# Patient Record
Sex: MALE | Race: WHITE | NOT HISPANIC OR LATINO | Employment: OTHER | URBAN - NONMETROPOLITAN AREA
[De-identification: names, ages, dates, MRNs, and addresses within clinical notes are randomized per-mention and may not be internally consistent; named-entity substitution may affect disease eponyms.]

---

## 2017-05-08 RX ORDER — CLOPIDOGREL BISULFATE 75 MG/1
75 TABLET ORAL DAILY
Qty: 184 TABLET | Refills: 0 | Status: SHIPPED | OUTPATIENT
Start: 2017-05-08 | End: 2017-11-08

## 2017-05-22 ENCOUNTER — OFFICE VISIT (OUTPATIENT)
Dept: VASCULAR SURGERY | Facility: CLINIC | Age: 57
End: 2017-05-22

## 2017-05-22 ENCOUNTER — HOSPITAL ENCOUNTER (OUTPATIENT)
Dept: ULTRASOUND IMAGING | Facility: HOSPITAL | Age: 57
Discharge: HOME | End: 2017-05-22
Admitting: NURSE PRACTITIONER

## 2017-05-22 VITALS
SYSTOLIC BLOOD PRESSURE: 128 MMHG | WEIGHT: 181 LBS | HEART RATE: 86 BPM | HEIGHT: 76 IN | BODY MASS INDEX: 22.04 KG/M2 | DIASTOLIC BLOOD PRESSURE: 92 MMHG

## 2017-05-22 DIAGNOSIS — Z72.0 TOBACCO ABUSE: ICD-10-CM

## 2017-05-22 DIAGNOSIS — I73.9 PAD (PERIPHERAL ARTERY DISEASE) (HCC): ICD-10-CM

## 2017-05-22 DIAGNOSIS — E78.00 HYPERCHOLESTEROLEMIA: ICD-10-CM

## 2017-05-22 DIAGNOSIS — I10 ESSENTIAL HYPERTENSION: ICD-10-CM

## 2017-05-22 DIAGNOSIS — I73.9 PAD (PERIPHERAL ARTERY DISEASE) (HCC): Primary | ICD-10-CM

## 2017-05-22 PROCEDURE — 99214 OFFICE O/P EST MOD 30 MIN: CPT | Performed by: NURSE PRACTITIONER

## 2017-05-22 PROCEDURE — 93923 UPR/LXTR ART STDY 3+ LVLS: CPT

## 2017-05-22 PROCEDURE — 93923 UPR/LXTR ART STDY 3+ LVLS: CPT | Performed by: SURGERY

## 2017-11-17 ENCOUNTER — HOSPITAL ENCOUNTER (OUTPATIENT)
Dept: ULTRASOUND IMAGING | Facility: HOSPITAL | Age: 57
Discharge: HOME | End: 2017-11-17
Admitting: NURSE PRACTITIONER

## 2017-11-17 ENCOUNTER — OFFICE VISIT (OUTPATIENT)
Dept: VASCULAR SURGERY | Facility: CLINIC | Age: 57
End: 2017-11-17

## 2017-11-17 VITALS
HEART RATE: 92 BPM | SYSTOLIC BLOOD PRESSURE: 124 MMHG | BODY MASS INDEX: 22.41 KG/M2 | DIASTOLIC BLOOD PRESSURE: 80 MMHG | HEIGHT: 76 IN | WEIGHT: 184 LBS

## 2017-11-17 DIAGNOSIS — I73.9 PAD (PERIPHERAL ARTERY DISEASE) (HCC): Primary | ICD-10-CM

## 2017-11-17 DIAGNOSIS — I73.9 PAD (PERIPHERAL ARTERY DISEASE) (HCC): ICD-10-CM

## 2017-11-17 DIAGNOSIS — E78.00 HYPERCHOLESTEROLEMIA: ICD-10-CM

## 2017-11-17 DIAGNOSIS — I10 ESSENTIAL HYPERTENSION: ICD-10-CM

## 2017-11-17 DIAGNOSIS — Z72.0 TOBACCO ABUSE: ICD-10-CM

## 2017-11-17 PROCEDURE — 93924 LWR XTR VASC STDY BILAT: CPT | Performed by: SURGERY

## 2017-11-17 PROCEDURE — 99213 OFFICE O/P EST LOW 20 MIN: CPT | Performed by: NURSE PRACTITIONER

## 2017-11-17 PROCEDURE — 93923 UPR/LXTR ART STDY 3+ LVLS: CPT

## 2017-11-17 RX ORDER — CLOPIDOGREL BISULFATE 75 MG/1
75 TABLET ORAL DAILY
Qty: 30 TABLET | Refills: 5 | Status: ON HOLD | OUTPATIENT
Start: 2017-11-17 | End: 2019-12-31 | Stop reason: SDUPTHER

## 2017-11-17 RX ORDER — CLOPIDOGREL BISULFATE 75 MG/1
75 TABLET ORAL DAILY
Qty: 30 TABLET | Refills: 5 | Status: SHIPPED | OUTPATIENT
Start: 2017-11-17 | End: 2017-11-17 | Stop reason: SDUPTHER

## 2017-11-17 RX ORDER — CLOPIDOGREL BISULFATE 75 MG/1
75 TABLET ORAL DAILY
COMMUNITY
End: 2017-11-17 | Stop reason: SDUPTHER

## 2017-11-17 NOTE — PROGRESS NOTES
"11/17/2017       Narendra Camara MD  [address] [place] [address] [address]  [place] [address]        Sascha MUÑIZ Moapa Sr.  1960    Chief Complaint   Patient presents with   • Follow-up     Patient here for SAMI results. Pain to right leg when walking.       Dear Narendra Camara MD:    HPI     I had the pleasure of seeing you patient in the office today for follow up.  As you recall, the patient is a 57 y.o. male who we are currently following for bilateral lower extremity PAD. He initially underwent intervention of his left leg in March 2013. He was lost to follow-up at that point and return to year and a half later complaining of disabling claudication in the left leg. It is sensitive been occluded and he was taken back for revascularization on 2 other occasions, most recently February 2016. Unfortunately, he does continue to smoke cigarettes. He is now back for continued follow-up. He did have noninvasive testing done today, which I did review. He is currently doing well and denies any claudication to his lower extremity.  He does have complaints of burning and numbness to his right lower extremity.     /80  Pulse 92  Ht 76\" (193 cm)  Wt 184 lb (83.5 kg)  BMI 22.4 kg/m2  Physical Exam  Constitutional: He is oriented to person, place, and time. He appears well-developed and well-nourished. No distress.   HENT:   Head: Normocephalic and atraumatic.   Mouth/Throat: Oropharynx is clear and moist and mucous membranes are normal.   Eyes: Pupils are equal, round, and reactive to light.   Neck: Normal range of motion. Neck supple. No JVD present. Carotid bruit is not present. No thyromegaly present.   Cardiovascular: Normal rate, regular rhythm, S1 normal, S2 normal, normal heart sounds and normal pulses. Exam reveals no gallop and no friction rub.   No murmur heard.  Pulses:  Femoral pulses are 2+ on the right side, and 2+ on the left side.  Popliteal pulses are 2+ on the right side, and 2+ on the left side. "   Dorsalis pedis pulses are 2+ on the right side, and 2+ on the left side.   Posterior tibial pulses are 2+ on the right side, and 2+ on the left side.   Pulmonary/Chest: Effort normal and breath sounds normal.   Abdominal: Soft. Normal appearance, normal aorta and bowel sounds are normal. He exhibits no abdominal bruit. There is no hepatosplenomegaly. There is no tenderness.   Musculoskeletal: Normal range of motion.     Vascular Status - His exam exhibits no right foot edema. His exam exhibits no left foot edema.  Neurological: He is alert and oriented to person, place, and time. He has normal strength. No cranial nerve deficit.   Skin: Skin is warm, dry and intact. He is not diaphoretic.   Psychiatric: He has a normal mood and affect. His behavior is normal. Judgment and thought content normal. Cognition and memory are normal.   Nursing note and vitals reviewed.    DIAGNOSTIC DATA:      Patient Active Problem List   Diagnosis   • Pneumonia of both lower lobes due to infectious organism   • Sinus tachycardia   • Tobacco abuse   • Emphysema of lung   • Cough with hemoptysis   • Coffee ground emesis   • Paroxysmal SVT (supraventricular tachycardia)   • Chest pain with low risk for cardiac etiology   • Hypercholesterolemia   • Hypertension   • Chronic back pain         ICD-10-CM ICD-9-CM   1. PAD (peripheral artery disease) I73.9 443.9   2. Hypercholesterolemia E78.00 272.0   3. Essential hypertension I10 401.9   4. Tobacco abuse Z72.0 305.1         PLAN: After thoroughly evaluating Sascha Ceronclari Taylor, I believe the best course of action is to remain conservative from a vascular standpoint.  We will see Sascha MUÑIZ Cecille Taylor back in 6 months with repeat noninvasive testing for continued surveillance.  His right leg pain/numbness sounds like it is related to lumbar radiculopathy.  He does have a history of chronic back pain.  I did  extensively on smoking cessation, and the patient was advised of the continued risks  of smoking.  I provided over 10 minutes counseling on this matter.  I did discuss vascular risk factors as they pertain to the progression of vascular disease including controlling his hypertension, hyperlipidemia, and smoking cessation.  The patient is to continue taking their medications as previously discussed.   This was all discussed in full with complete understanding.    Thank you for allowing me to participate in the care of your patient.  Please do not hesitate to call with any questions or concerns.  We will keep you aware of any further encounters with Sascha Oden Sr..      Sincerely Yours,        FORTNIO Dubose

## 2017-11-24 ENCOUNTER — HOSPITAL ENCOUNTER (EMERGENCY)
Facility: HOSPITAL | Age: 57
Discharge: HOME | End: 2017-11-25
Attending: FAMILY MEDICINE | Admitting: FAMILY MEDICINE

## 2017-11-24 DIAGNOSIS — M54.50 ACUTE BILATERAL LOW BACK PAIN WITHOUT SCIATICA: ICD-10-CM

## 2017-11-24 DIAGNOSIS — R10.32 LEFT LOWER QUADRANT PAIN: ICD-10-CM
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
## 2017-11-24 PROCEDURE — 93005 ELECTROCARDIOGRAM TRACING: CPT

## 2017-11-24 PROCEDURE — 85025 COMPLETE CBC W/AUTO DIFF WBC: CPT | Performed by: FAMILY MEDICINE

## 2017-11-24 PROCEDURE — 80053 COMPREHEN METABOLIC PANEL: CPT | Performed by: FAMILY MEDICINE

## 2017-11-24 PROCEDURE — 83690 ASSAY OF LIPASE: CPT | Performed by: FAMILY MEDICINE

## 2017-11-24 PROCEDURE — 99284 EMERGENCY DEPT VISIT MOD MDM: CPT

## 2017-11-24 PROCEDURE — 85007 BL SMEAR W/DIFF WBC COUNT: CPT | Performed by: FAMILY MEDICINE

## 2017-11-24 PROCEDURE — 93010 ELECTROCARDIOGRAM REPORT: CPT | Performed by: INTERNAL MEDICINE

## 2017-11-25 ENCOUNTER — APPOINTMENT (OUTPATIENT)
Dept: CT IMAGING | Facility: HOSPITAL | Age: 57
End: 2017-11-25

## 2017-11-25 VITALS
HEIGHT: 76 IN | RESPIRATION RATE: 26 BRPM | WEIGHT: 210 LBS | TEMPERATURE: 98.4 F | OXYGEN SATURATION: 94 % | DIASTOLIC BLOOD PRESSURE: 76 MMHG | HEART RATE: 80 BPM | BODY MASS INDEX: 25.57 KG/M2 | SYSTOLIC BLOOD PRESSURE: 109 MMHG

## 2017-11-25 LAB
ALBUMIN SERPL-MCNC: 4.2 G/DL (ref 3.5–5)
ALBUMIN/GLOB SERPL: 1.4 G/DL (ref 1.1–2.5)
ALP SERPL-CCNC: 59 U/L (ref 24–120)
ALT SERPL W P-5'-P-CCNC: 74 U/L (ref 0–54)
ANION GAP SERPL CALCULATED.3IONS-SCNC: 14 MMOL/L (ref 4–13)
AST SERPL-CCNC: 46 U/L (ref 7–45)
BASOPHILS # BLD AUTO: 0.06 10*3/MM3 (ref 0–0.2)
BASOPHILS NFR BLD AUTO: 0.5 % (ref 0–2)
BILIRUB SERPL-MCNC: 0.4 MG/DL (ref 0.1–1)
BILIRUB UR QL STRIP: NEGATIVE
BUN BLD-MCNC: 6 MG/DL (ref 5–21)
BUN/CREAT SERPL: 7 (ref 7–25)
CALCIUM SPEC-SCNC: 9 MG/DL (ref 8.4–10.4)
CHLORIDE SERPL-SCNC: 97 MMOL/L (ref 98–110)
CLARITY UR: CLEAR
CO2 SERPL-SCNC: 23 MMOL/L (ref 24–31)
COLOR UR: YELLOW
CREAT BLD-MCNC: 0.86 MG/DL (ref 0.5–1.4)
DEPRECATED RDW RBC AUTO: 44.7 FL (ref 40–54)
EOSINOPHIL # BLD AUTO: 0.31 10*3/MM3 (ref 0–0.7)
EOSINOPHIL NFR BLD AUTO: 2.5 % (ref 0–4)
ERYTHROCYTE [DISTWIDTH] IN BLOOD BY AUTOMATED COUNT: 13.1 % (ref 12–15)
GFR SERPL CREATININE-BSD FRML MDRD: 92 ML/MIN/1.73
GLOBULIN UR ELPH-MCNC: 3 GM/DL
GLUCOSE BLD-MCNC: 91 MG/DL (ref 70–100)
GLUCOSE UR STRIP-MCNC: NEGATIVE MG/DL
HCT VFR BLD AUTO: 45.2 % (ref 40–52)
HGB BLD-MCNC: 15.8 G/DL (ref 14–18)
HGB UR QL STRIP.AUTO: NEGATIVE
IMM GRANULOCYTES # BLD: 0.05 10*3/MM3 (ref 0–0.03)
IMM GRANULOCYTES NFR BLD: 0.4 % (ref 0–5)
KETONES UR QL STRIP: NEGATIVE
LEUKOCYTE ESTERASE UR QL STRIP.AUTO: NEGATIVE
LIPASE SERPL-CCNC: 103 U/L (ref 23–203)
LYMPHOCYTES # BLD AUTO: 3.19 10*3/MM3 (ref 0.72–4.86)
LYMPHOCYTES NFR BLD AUTO: 25.3 % (ref 15–45)
MCH RBC QN AUTO: 32.7 PG (ref 28–32)
MCHC RBC AUTO-ENTMCNC: 35 G/DL (ref 33–36)
MCV RBC AUTO: 93.6 FL (ref 82–95)
MONOCYTES # BLD AUTO: 1.47 10*3/MM3 (ref 0.19–1.3)
MONOCYTES NFR BLD AUTO: 11.7 % (ref 4–12)
NEUTROPHILS # BLD AUTO: 7.51 10*3/MM3 (ref 1.87–8.4)
NEUTROPHILS NFR BLD AUTO: 59.6 % (ref 39–78)
NITRITE UR QL STRIP: NEGATIVE
NRBC BLD MANUAL-RTO: 0 /100 WBC (ref 0–0)
PH UR STRIP.AUTO: <=5 [PH] (ref 5–8)
PLATELET # BLD AUTO: 210 10*3/MM3 (ref 130–400)
PMV BLD AUTO: 9.9 FL (ref 6–12)
POTASSIUM BLD-SCNC: 3.7 MMOL/L (ref 3.5–5.3)
PROT SERPL-MCNC: 7.2 G/DL (ref 6.3–8.7)
PROT UR QL STRIP: NEGATIVE
RBC # BLD AUTO: 4.83 10*6/MM3 (ref 4.8–5.9)
RBC MORPH BLD: NORMAL
SMALL PLATELETS BLD QL SMEAR: ADEQUATE
SODIUM BLD-SCNC: 134 MMOL/L (ref 135–145)
SP GR UR STRIP: 1.01 (ref 1–1.03)
UROBILINOGEN UR QL STRIP: NORMAL
WBC MORPH BLD: NORMAL
WBC NRBC COR # BLD: 12.59 10*3/MM3 (ref 4.8–10.8)

## 2017-11-25 PROCEDURE — 0 IOPAMIDOL 61 % SOLUTION: Performed by: FAMILY MEDICINE

## 2017-11-25 PROCEDURE — 74177 CT ABD & PELVIS W/CONTRAST: CPT

## 2017-11-25 PROCEDURE — 72131 CT LUMBAR SPINE W/O DYE: CPT

## 2017-11-25 PROCEDURE — 81003 URINALYSIS AUTO W/O SCOPE: CPT | Performed by: FAMILY MEDICINE

## 2017-11-25 PROCEDURE — 96374 THER/PROPH/DIAG INJ IV PUSH: CPT

## 2017-11-25 PROCEDURE — 72128 CT CHEST SPINE W/O DYE: CPT

## 2017-11-25 PROCEDURE — 25010000002 KETOROLAC TROMETHAMINE PER 15 MG: Performed by: FAMILY MEDICINE

## 2017-11-25 RX ORDER — KETOROLAC TROMETHAMINE 30 MG/ML
30 INJECTION, SOLUTION INTRAMUSCULAR; INTRAVENOUS ONCE
Status: COMPLETED | OUTPATIENT
Start: 2017-11-25 | End: 2017-11-25

## 2017-11-25 RX ADMIN — KETOROLAC TROMETHAMINE 30 MG: 30 INJECTION, SOLUTION INTRAMUSCULAR at 01:50

## 2017-11-25 RX ADMIN — IOPAMIDOL 100 ML: 612 INJECTION, SOLUTION INTRAVENOUS at 01:37

## 2017-11-25 NOTE — ED PROVIDER NOTES
[facility]  eMERGENCY dEPARTMENT eNCOUnter      Pt Name: Sascha Oden Sr.  MRN: [id number]  Birthdate 1960  Date of evaluation: 11/25/2017      CHIEF COMPLAINT       Chief Complaint   Patient presents with   • Syncope   • Fall       Nurses Notes reviewed and I agree except as noted in the HPI.      HISTORY OF PRESENT ILLNESS    Sascha Oden Sr. is a 57 y.o. male who presents   Mr. table or presents for back pain and left lower quadrant pain after sustaining a fall.  Patient reports that he was spraying some bug spray inhaled some of it and this caused him to get very dizzy everything went kind of black and he fell backwards.  He states that he landed on a concrete floor in the residence.  He denies hitting his head.  He states that his lower back hurts as well as his left lower quadrant.  He reports having 4 beers tonight.  He describes the pain as sharp and severe.  No radiation.       REVIEW OF SYSTEMS     Review of Systems  CONSTITUION: No Fever, No chills, No activity change, No diaphoresis, No unexpected wt change.    HENT: No congestion, no dental problems, no ear pain or discharge,   EYES: No drainage, no itching, no photophobia, no visual disturbance  RESPIRATORY: No apnea, no chest tightness, no cough, no shortness of breath, no stridor, no wheezing  CARDIOVASCULAR: No chest pain, no palpitations, no leg swelling  GI: Per the HPI otherwise negative   ENDOCRINE: No polydipsia, no polyphagia, no polyuria, no cold or heat intolerance  : No difficulty urinating, no dyspareunia, nodysuria, no flank pain, no frequency, no genital sore, no hematuria, no menstrual problem if female, no decreased urniation, no hesitation of urination, no vaginal discharge if female, no vaginal pain if female no penile pain if male  ALLERG/IMMUNO:  No env or food allergies, not immunocompromised  NEUROLOGICAL: No dizziness, no facial asymmetry, no Headaches, no Light-headedness, no numbess nor paresthesias, no  seizures, no speech difficulty, No syncope, no temors, no weakness  HEMATOLOGIC: No adenopathy, no unusual bleeding or bruising  MUSC:As per the HPI otherwise negative   SKIN: No rash, no color change, no pallor, no wound  PSYCH: No agitation, no behavior problem, no confusion, no decr concentration, no hallucinations, no suicidal ideation, no homicidal ideation, no self injury, no sleep disturbance      PAST MEDICAL HISTORY     Past Medical History:   Diagnosis Date   • Arthritis    • Chronic back pain     s/p fall    • Emphysema of lung    • Hypercholesterolemia     Statin therapy    • Hypertension    • PAP (pulmonary alveolar proteinosis)    • PVD (peripheral vascular disease)     Followed by Dr. Juarez; ASA, Plavix, leg revascularization    • Tobacco abuse     1 PPD       SURGICAL HISTORY      has a past surgical history that includes Angioplasty; Carotid stent; Appendectomy; Back surgery; Leg Revascularization (Bilateral); and Hand surgery (Right).    CURRENT MEDICATIONS        Medication List      CONTINUE taking these medications          clopidogrel 75 MG tablet   Commonly known as:  PLAVIX   Take 1 tablet by mouth Daily.       HYDROcodone-acetaminophen  MG per tablet   Commonly known as:  NORCO       methocarbamol 750 MG tablet   Commonly known as:  ROBAXIN       metoprolol tartrate 25 MG tablet   Commonly known as:  LOPRESSOR   Take 0.5 tablets by mouth Every 12 (Twelve) Hours.       simvastatin 20 MG tablet   Commonly known as:  ZOCOR           ALLERGIES     is allergic to codeine.    FAMILY HISTORY     indicated that his mother is alive. He indicated that his father is . He indicated that his maternal grandfather is . He indicated that the status of his paternal grandmother is unknown. He indicated that the status of his paternal grandfather is unknown. He indicated that the status of his other is unknown.  family history includes Arrhythmia in his paternal grandmother; Cancer  "in his other, paternal grandfather, and paternal grandmother; Diabetes in his mother and other; Heart attack in his maternal grandfather; Heart disease in his other; Hypertension in his mother; Kidney disease in his father and other; Stroke in his other.    SOCIAL HISTORY      reports that he has been smoking Cigarettes.  He has been smoking about 0.50 packs per day. He has never used smokeless tobacco. He reports that he drinks about 7.2 oz of alcohol per week  He reports that he does not use illicit drugs.    PHYSICAL EXAM     INITIAL VITALS:  height is 76\" (193 cm) and weight is 210 lb (95.3 kg). His temperature is 98.6 °F (37 °C). His blood pressure is 117/78 and his pulse is 97. His respiration is 14 and oxygen saturation is 95%.    Physical Exam    CONSTITUTIONAL: Well developed, well nourished, not diaphoretic nor distressed  HENT: Normocephalic, atraumatic, oropharynx clear and moist  EYES: PERRL, EOM normal, no discharge, no scleral icterus  NECK: ROM normal, supple, no tracheal deviation nor JVD, no stridor  CARDIOVASCULAR: Normal rate and rhythm, heart sounds normal, no rub no gallop, intact distal pulses, normal cap refill  PULMONARY: Normal effort and breath sounds, no distress, no wheezes, rhonci or rales, no chest tenderness  ABDOMINAL: Soft, nontender, no guarding, no mass, no rebound, no hernia  GENITOURINARY/ANORECTAL: deferred  MUSCKULOSKELETAL: ROM normal, no tenderness nor deformity, no edema  NEUROLOGICAL: Alert, oriented x 3, DTRS normal, CN x 10 normal, normal tone  SKIN: Warm, dry, no erythema, no rash, normal color  PSYCH: Mood and affect normal, behavior normal, thought content and judgement normal.    DIFFERENTIAL DIAGNOSIS:       DIAGNOSTIC RESULTS     EKG: All EKG's are interpreted by the Emergency Department Physician who either signs or Co-signs this chart in the absence of a cardiologist.  EKG 2315 11/24/2017 sinus rhythm 94 bpm normal axis normal intervals and Q waves in V1 and V2 " no acute ST-T wave changes noted.    RADIOLOGY: non-plain film images(s) such as CT, Ultrasound and MRI are read by the radiologist.  Plain radiographic images are visualized and preliminarily interpreted by the emergency physician unless otherwise stated below.  CT Abdomen Pelvis With Contrast    (Results Pending)   CT Lumbar Spine Without Contrast    (Results Pending)   CT Thoracic Spine Without Contrast    (Results Pending)     CT scans noted to show no acute changes per radiology.           LABS:   Lab Results (last 24 hours)     Procedure Component Value Units Date/Time    CBC & Differential [[id number]] Collected:  11/24/17 2345    Specimen:  Blood Updated:  11/25/17 0059    Narrative:       The following orders were created for panel order CBC & Differential.  Procedure                               Abnormality         Status                     ---------                               -----------         ------                     Scan Slide[[id number]]                                       Final result               CBC Auto Differential[[id number]]        Abnormal            Final result                 Please view results for these tests on the individual orders.    Comprehensive Metabolic Panel [[id number]]  (Abnormal) Collected:  11/24/17 2345    Specimen:  Blood Updated:  11/25/17 0002     Glucose 91 mg/dL      BUN 6 mg/dL      Creatinine 0.86 mg/dL      Sodium 134 (L) mmol/L      Potassium 3.7 mmol/L      Chloride 97 (L) mmol/L      CO2 23.0 (L) mmol/L      Calcium 9.0 mg/dL      Total Protein 7.2 g/dL      Albumin 4.20 g/dL      ALT (SGPT) 74 (H) U/L      AST (SGOT) 46 (H) U/L      Alkaline Phosphatase 59 U/L      Total Bilirubin 0.4 mg/dL      eGFR Non African Amer 92 mL/min/1.73      Globulin 3.0 gm/dL      A/G Ratio 1.4 g/dL      BUN/Creatinine Ratio 7.0     Anion Gap 14.0 (H) mmol/L     Lipase [[id number]]  (Normal) Collected:  11/24/17 2345    Specimen:  Blood Updated:  11/25/17 0002     Lipase 103  "U/L     CBC Auto Differential [[id number]]  (Abnormal) Collected:  11/24/17 2345    Specimen:  Blood Updated:  11/25/17 0059     WBC 12.59 (H) 10*3/mm3      RBC 4.83 10*6/mm3      Hemoglobin 15.8 g/dL      Hematocrit 45.2 %      MCV 93.6 fL      MCH 32.7 (H) pg      MCHC 35.0 g/dL      RDW 13.1 %      RDW-SD 44.7 fl      MPV 9.9 fL      Platelets 210 10*3/mm3      Neutrophil % 59.6 %      Lymphocyte % 25.3 %      Monocyte % 11.7 %      Eosinophil % 2.5 %      Basophil % 0.5 %      Immature Grans % 0.4 %      Neutrophils, Absolute 7.51 10*3/mm3      Lymphocytes, Absolute 3.19 10*3/mm3      Monocytes, Absolute 1.47 (H) 10*3/mm3      Eosinophils, Absolute 0.31 10*3/mm3      Basophils, Absolute 0.06 10*3/mm3      Immature Grans, Absolute 0.05 (H) 10*3/mm3      nRBC 0.0 /100 WBC     Narrative:       Appended report.  These results have been appended to a previously verified report.    Scan Slide [[id number]] Collected:  11/24/17 2345    Specimen:  Blood Updated:  11/25/17 0059     RBC Morphology Normal     WBC Morphology Normal     Platelet Estimate Adequate    Urinalysis With / Culture If Indicated - Urine, Clean Catch [[id number]]  (Normal) Collected:  11/25/17 0012    Specimen:  Urine from Urine, Clean Catch Updated:  11/25/17 0026     Color, UA Yellow     Appearance, UA Clear     pH, UA <=5.0     Specific Gravity, UA 1.011     Glucose, UA Negative     Ketones, UA Negative     Bilirubin, UA Negative     Blood, UA Negative     Protein, UA Negative     Leuk Esterase, UA Negative     Nitrite, UA Negative     Urobilinogen, UA 0.2 E.U./dL    Narrative:       Urine microscopic not indicated.          EMERGENCY DEPARTMENT COURSE:   Vitals:    Vitals:    11/24/17 2312   BP: 117/78   Pulse: 97   Resp: 14   Temp: 98.6 °F (37 °C)   SpO2: 95%   Weight: 210 lb (95.3 kg)   Height: 76\" (193 cm)     After resting in the department patient reports much improvement of his symptoms.  The patient was given the following " medications:  Medications   ketorolac (TORADOL) injection 30 mg (30 mg Intravenous Given 11/25/17 0150)   iopamidol (ISOVUE-300) 61 % injection 100 mL (100 mL Intravenous Given 11/25/17 0137)       ED Course       CRITICAL CARE:  none    CONSULTS:  none    PROCEDURES:  None    FINAL IMPRESSION      1. Fall, initial encounter    2. Acute bilateral low back pain without sciatica    3. Left lower quadrant pain          DISPOSITION/PLAN   Patient will be discharged home in improved condition.  Patient is instructed to return if symptoms change or worsen.      PATIENT REFERRED TO:  Narendra Camara MD  [address] [place] [address] [address]  [place] [address] [phone]    In 2 days        DISCHARGE MEDICATIONS:     Medication List      CONTINUE taking these medications          clopidogrel 75 MG tablet   Commonly known as:  PLAVIX   Take 1 tablet by mouth Daily.       HYDROcodone-acetaminophen  MG per tablet   Commonly known as:  NORCO       methocarbamol 750 MG tablet   Commonly known as:  ROBAXIN       metoprolol tartrate 25 MG tablet   Commonly known as:  LOPRESSOR   Take 0.5 tablets by mouth Every 12 (Twelve) Hours.       simvastatin 20 MG tablet   Commonly known as:  ZOCOR           (Please note that portions of this note were completed with a voice recognition program.)    Raysa Husain, DO Raysa Husain DO  11/25/17 0519

## 2017-11-30 NOTE — ED NOTES
"ED Call Back Questions    1. How are you doing since leaving the Emergency Department?    Still hurting like hannahliz  2. Do you have any questions about your discharge instructions? No     3. Have you filled your new prescriptions yet? N/A  a. Do you have any questions about those medications? N/A    4. Were you able to make a follow-up appointment with the physician? Yes     5. Do you have a primary care physician? Yes   a. If No, would you like for me to set you up with one? N/A  i. If Yes, “I will have our ED  give you a call right back at this number to work with you on the best time for an appointment.”    6. We are always looking to get better at what we do. Do you have any suggestions for what we can do to be even better? No   a. If Yes, \"Thank you for sharing your concerns. I apologize. I will follow up with our manager and patient . Would you like someone to call you back?\" No     7. Is there anything else I can do for you? No     Visit was good     Tian Plascencia  11/30/17 5630    "

## 2018-01-02 ENCOUNTER — TELEPHONE (OUTPATIENT)
Dept: VASCULAR SURGERY | Facility: CLINIC | Age: 58
End: 2018-01-02

## 2018-01-02 NOTE — TELEPHONE ENCOUNTER
Called patient regarding Plavix.[facility] not be taking [facility].Patient stated he is getting script filled at [facility].

## 2018-06-04 ENCOUNTER — TELEPHONE (OUTPATIENT)
Dept: VASCULAR SURGERY | Facility: CLINIC | Age: 58
End: 2018-06-04

## 2018-06-04 NOTE — PROGRESS NOTES
"06/05/2018       Narendra Camara MD  [address] [place] [address] [address]  [place] [address]        Sascha Ceronor Sr.  1960    Chief Complaint   Patient presents with   • Follow-up     Follow up for SAMI results.Leg pain when walking.Legs cramp at night.       Dear Narendra Camara MD:    HPI     I had the pleasure of seeing you patient in the office today for follow up.  As you recall, the patient is a 57 y.o. male who we are currently following for bilateral lower extremity PAD. He initially underwent intervention of his left leg in March 2013. He was lost to follow-up at that point and return to year and a half later complaining of disabling claudication in the left leg. It has since been occluded and he was taken back for revascularization on 2 other occasions, most recently February 2016. Unfortunately, he does continue to smoke cigarettes. He did have noninvasive testing done today, which I did review. He is currently doing well and complaints of intermittent claudication.  He did have noninvasive testing performed today, which I did review in office.    /88   Pulse 88   Ht 193 cm (76\")   Wt 86.2 kg (190 lb)   BMI 23.13 kg/m²   Physical Exam  Constitutional: He is oriented to person, place, and time. He appears well-developed and well-nourished. No distress.   HENT:   Head: Normocephalic and atraumatic.   Mouth/Throat: Oropharynx is clear and moist and mucous membranes are normal.   Eyes: Pupils are equal, round, and reactive to light.   Neck: Normal range of motion. Neck supple. No JVD present. Carotid bruit is not present. No thyromegaly present.   Cardiovascular: Normal rate, regular rhythm, S1 normal, S2 normal, normal heart sounds and normal pulses. Exam reveals no gallop and no friction rub.   No murmur heard.  Pulses:  Femoral pulses are 2+ on the right side, and 2+ on the left side.  Popliteal pulses are 2+ on the right side, and 2+ on the left side.   Dorsalis pedis pulses are 2+ on the right " side, and 2+ on the left side.   Posterior tibial pulses are 2+ on the right side, and 2+ on the left side.   Pulmonary/Chest: Effort normal and breath sounds normal.   Abdominal: Soft. Normal appearance, normal aorta and bowel sounds are normal. He exhibits no abdominal bruit. There is no hepatosplenomegaly. There is no tenderness.   Musculoskeletal: Normal range of motion.     Vascular Status - His exam exhibits no right foot edema. His exam exhibits no left foot edema.  Neurological: He is alert and oriented to person, place, and time. He has normal strength. No cranial nerve deficit.   Skin: Skin is warm, dry and intact. He is not diaphoretic.   Psychiatric: He has a normal mood and affect. His behavior is normal. Judgment and thought content normal. Cognition and memory are normal.   Nursing note and vitals reviewed.    DIAGNOSTIC DATA:   Us Ankle / Brachial Indices Extremity Complete    Result Date: 6/5/2018  Narrative:  History: PAD  Comments: Bilateral lower extremity arterial with multi-level pulse volume recordings and segmental pressures were performed at rest and stress.  The right ankle/brachial index is 1.03. The waveforms are triphasic without dampening.These findings are consistent with no significant arterial insufficiency of the right lower extremity at rest.  The postexercise ABIs 0.87.  The left ankle/brachial index is 1.03. The waveforms are triphasic without dampening. These findings are consistent with no significant arterial insufficiency of the left lower extremity at rest.    The postexercise ABIs 1.06.      Impression: Impression: 1. No significant arterial insufficiency of the right lower extremity at rest. 2. No significant arterial insufficiency of the left lower extremity at rest.   This report was finalized on 06/05/2018 09:11 by Dr. Jitendra Juarez MD.      Patient Active Problem List   Diagnosis   • Pneumonia of both lower lobes due to infectious organism   • Sinus tachycardia   •  Tobacco abuse   • Emphysema of lung   • Cough with hemoptysis   • Coffee ground emesis   • Paroxysmal SVT (supraventricular tachycardia)   • Chest pain with low risk for cardiac etiology   • Hypercholesterolemia   • Hypertension   • Chronic back pain         ICD-10-CM ICD-9-CM   1. PAD (peripheral artery disease) I73.9 443.9   2. Essential hypertension I10 401.9   3. Hypercholesterolemia E78.00 272.0   4. Tobacco abuse Z72.0 305.1         PLAN: After thoroughly evaluating Sascha Ceronclari Taylor, I believe the best course of action is to remain conservative from a vascular standpoint.  We will see Sascha Oden Sr. back in 6 months with repeat noninvasive testing for continued surveillance.  His right leg pain/numbness sounds like it is related to lumbar radiculopathy.  He does have a history of chronic back pain.  I did  extensively on smoking cessation, and the patient was advised of the continued risks of smoking.  I provided over 10 minutes counseling on this matter.  I did discuss vascular risk factors as they pertain to the progression of vascular disease including controlling his hypertension, hyperlipidemia, and smoking cessation.  Body mass index is 23.13 kg/m². The patient is to continue taking their medications as previously discussed.   This was all discussed in full with complete understanding.    Thank you for allowing me to participate in the care of your patient.  Please do not hesitate to call with any questions or concerns.  We will keep you aware of any further encounters with Sascha Ceronclari Taylor.      Sincerely Yours,        FORTINO Dubose

## 2018-06-04 NOTE — TELEPHONE ENCOUNTER
Left message reminding Mr Oden of his appointments tomorrow. Reminded MR Oden to arrive at the [facility] at 830 am for testing and follow up afterwards with Dr Juarez at 1030 am. Also advised if he had any questions or needed to reschedule to please call the office at [phone].

## 2018-06-05 ENCOUNTER — OFFICE VISIT (OUTPATIENT)
Dept: VASCULAR SURGERY | Facility: CLINIC | Age: 58
End: 2018-06-05

## 2018-06-05 ENCOUNTER — HOSPITAL ENCOUNTER (OUTPATIENT)
Dept: ULTRASOUND IMAGING | Facility: HOSPITAL | Age: 58
Discharge: HOME | End: 2018-06-05
Admitting: NURSE PRACTITIONER

## 2018-06-05 VITALS
WEIGHT: 190 LBS | DIASTOLIC BLOOD PRESSURE: 88 MMHG | HEART RATE: 88 BPM | HEIGHT: 76 IN | BODY MASS INDEX: 23.14 KG/M2 | SYSTOLIC BLOOD PRESSURE: 118 MMHG

## 2018-06-05 DIAGNOSIS — I10 ESSENTIAL HYPERTENSION: ICD-10-CM

## 2018-06-05 DIAGNOSIS — I73.9 PAD (PERIPHERAL ARTERY DISEASE) (HCC): Primary | ICD-10-CM

## 2018-06-05 DIAGNOSIS — Z72.0 TOBACCO ABUSE: ICD-10-CM

## 2018-06-05 DIAGNOSIS — E78.00 HYPERCHOLESTEROLEMIA: ICD-10-CM

## 2018-06-05 DIAGNOSIS — I73.9 PAD (PERIPHERAL ARTERY DISEASE) (HCC): ICD-10-CM

## 2018-06-05 PROCEDURE — 93924 LWR XTR VASC STDY BILAT: CPT | Performed by: SURGERY

## 2018-06-05 PROCEDURE — 99213 OFFICE O/P EST LOW 20 MIN: CPT | Performed by: NURSE PRACTITIONER

## 2018-06-05 PROCEDURE — 93923 UPR/LXTR ART STDY 3+ LVLS: CPT

## 2018-06-29 DIAGNOSIS — I73.9 PAD (PERIPHERAL ARTERY DISEASE) (HCC): ICD-10-CM

## 2018-06-29 LAB
ALBUMIN SERPL-MCNC: 4 G/DL (ref 3.5–5.2)
ALP BLD-CCNC: 74 U/L (ref 40–130)
ALT SERPL-CCNC: 66 U/L (ref 5–41)
ANION GAP SERPL CALCULATED.3IONS-SCNC: 14 MMOL/L (ref 7–19)
AST SERPL-CCNC: 56 U/L (ref 5–40)
BASOPHILS ABSOLUTE: 0.1 K/UL (ref 0–0.2)
BASOPHILS RELATIVE PERCENT: 0.8 % (ref 0–1)
BILIRUB SERPL-MCNC: 0.3 MG/DL (ref 0.2–1.2)
BUN BLDV-MCNC: 6 MG/DL (ref 6–20)
CALCIUM SERPL-MCNC: 9.4 MG/DL (ref 8.6–10)
CHLORIDE BLD-SCNC: 98 MMOL/L (ref 98–111)
CHOLESTEROL, TOTAL: 141 MG/DL (ref 160–199)
CO2: 25 MMOL/L (ref 22–29)
CREAT SERPL-MCNC: 0.6 MG/DL (ref 0.5–1.2)
EOSINOPHILS ABSOLUTE: 0.2 K/UL (ref 0–0.6)
EOSINOPHILS RELATIVE PERCENT: 2 % (ref 0–5)
GFR NON-AFRICAN AMERICAN: >60
GLUCOSE BLD-MCNC: 90 MG/DL (ref 74–109)
HCT VFR BLD CALC: 50 % (ref 42–52)
HDLC SERPL-MCNC: 64 MG/DL (ref 55–121)
HEMOGLOBIN: 17 G/DL (ref 14–18)
LDL CHOLESTEROL CALCULATED: 62 MG/DL
LYMPHOCYTES ABSOLUTE: 3.2 K/UL (ref 1.1–4.5)
LYMPHOCYTES RELATIVE PERCENT: 31.3 % (ref 20–40)
MCH RBC QN AUTO: 32.6 PG (ref 27–31)
MCHC RBC AUTO-ENTMCNC: 34 G/DL (ref 33–37)
MCV RBC AUTO: 95.8 FL (ref 80–94)
MONOCYTES ABSOLUTE: 1.2 K/UL (ref 0–0.9)
MONOCYTES RELATIVE PERCENT: 11.4 % (ref 0–10)
NEUTROPHILS ABSOLUTE: 5.5 K/UL (ref 1.5–7.5)
NEUTROPHILS RELATIVE PERCENT: 54 % (ref 50–65)
PDW BLD-RTO: 13.7 % (ref 11.5–14.5)
PLATELET # BLD: 179 K/UL (ref 130–400)
PMV BLD AUTO: 9.5 FL (ref 9.4–12.4)
POTASSIUM SERPL-SCNC: 4.4 MMOL/L (ref 3.5–5)
RBC # BLD: 5.22 M/UL (ref 4.7–6.1)
SODIUM BLD-SCNC: 137 MMOL/L (ref 136–145)
TOTAL PROTEIN: 7.6 G/DL (ref 6.6–8.7)
TRIGL SERPL-MCNC: 74 MG/DL (ref 0–149)
TSH SERPL DL<=0.05 MIU/L-ACNC: 1.37 UIU/ML (ref 0.27–4.2)
WBC # BLD: 10.3 K/UL (ref 4.8–10.8)

## 2018-06-29 RX ORDER — CLOPIDOGREL BISULFATE 75 MG/1
75 TABLET ORAL DAILY
Qty: 30 TABLET | Refills: 5 | Status: SHIPPED | OUTPATIENT
Start: 2018-06-29 | End: 2019-01-29

## 2018-12-04 ENCOUNTER — TELEPHONE (OUTPATIENT)
Dept: VASCULAR SURGERY | Facility: CLINIC | Age: 58
End: 2018-12-04

## 2018-12-04 NOTE — TELEPHONE ENCOUNTER
I contacted the patient to remind him of his appt tomorrow here in the office and prior to the visit here, to go to testing.  Pt was unaware of these two appointments, but stated that he would go.  Pt confirmed.

## 2018-12-05 ENCOUNTER — TELEPHONE (OUTPATIENT)
Dept: VASCULAR SURGERY | Facility: CLINIC | Age: 58
End: 2018-12-05

## 2018-12-05 NOTE — TELEPHONE ENCOUNTER
Called and left message for patient to call us back to reschedule the appts for the test and office visit he missed.

## 2019-01-28 ENCOUNTER — TELEPHONE (OUTPATIENT)
Dept: VASCULAR SURGERY | Facility: CLINIC | Age: 59
End: 2019-01-28

## 2019-01-28 NOTE — TELEPHONE ENCOUNTER
I called the patient to remind him of his testing and appt with Dr. Juarez tomorrow.  The patient stated that he was aware of both appts and confirmed.

## 2019-01-29 ENCOUNTER — OFFICE VISIT (OUTPATIENT)
Dept: VASCULAR SURGERY | Facility: CLINIC | Age: 59
End: 2019-01-29

## 2019-01-29 ENCOUNTER — HOSPITAL ENCOUNTER (OUTPATIENT)
Dept: ULTRASOUND IMAGING | Facility: HOSPITAL | Age: 59
Discharge: HOME | End: 2019-01-29
Admitting: NURSE PRACTITIONER

## 2019-01-29 VITALS
OXYGEN SATURATION: 99 % | HEART RATE: 91 BPM | BODY MASS INDEX: 25.57 KG/M2 | WEIGHT: 210 LBS | DIASTOLIC BLOOD PRESSURE: 90 MMHG | HEIGHT: 76 IN | SYSTOLIC BLOOD PRESSURE: 132 MMHG

## 2019-01-29 DIAGNOSIS — I73.9 PAD (PERIPHERAL ARTERY DISEASE) (HCC): Primary | ICD-10-CM

## 2019-01-29 DIAGNOSIS — E78.00 HYPERCHOLESTEROLEMIA: ICD-10-CM

## 2019-01-29 DIAGNOSIS — I10 ESSENTIAL HYPERTENSION: ICD-10-CM

## 2019-01-29 DIAGNOSIS — I73.9 PAD (PERIPHERAL ARTERY DISEASE) (HCC): ICD-10-CM

## 2019-01-29 DIAGNOSIS — Z72.0 TOBACCO ABUSE: ICD-10-CM

## 2019-01-29 DIAGNOSIS — Z71.6 TOBACCO ABUSE COUNSELING: ICD-10-CM

## 2019-01-29 PROCEDURE — 93923 UPR/LXTR ART STDY 3+ LVLS: CPT

## 2019-01-29 PROCEDURE — 99214 OFFICE O/P EST MOD 30 MIN: CPT | Performed by: NURSE PRACTITIONER

## 2019-01-29 PROCEDURE — 93924 LWR XTR VASC STDY BILAT: CPT | Performed by: SURGERY

## 2019-01-29 NOTE — H&P
1/29/2019       Narendra Camara MD  [address] [place] [address] [address]  [place] [address]        Sascha Ceronor Sr.  1960    Chief Complaint   Patient presents with   • Follow-up     6 Month Follow Up For Peripheral Artery Disease. Test 01/29/2019  pad ankle / brach ind ext comp. Patient denies any stroke like symptoms.    • other     Patient states right leg starting to give him trouble after he walks about 2-3 blocks then it gets bad. Patient states when it starts hurting it doesnt quit.        Dear Narendra Camara MD:    HPI     I had the pleasure of seeing you patient in the office today for follow up.  As you recall, the patient is a 58 y.o. male who we are currently following for bilateral lower extremity PAD.  It has since been occluded twice and he was taken back for revascularization on 2 other occasions, most recently February 2016. Unfortunately, he does continue to smoke cigarettes about 1/2 pack.  He is currently doing well and complaints of intermittent claudication to his right lower extremity.  He did have noninvasive testing performed today, which I did review in office.    Past Medical History:   Diagnosis Date   • Arthritis    • Chronic back pain 1991    s/p fall    • Emphysema of lung (CMS/HCC)    • Hypercholesterolemia     Statin therapy    • Hypertension    • PAP (pulmonary alveolar proteinosis) (CMS/HCC)    • PVD (peripheral vascular disease) (CMS/[facility])     Followed by Dr. Juarez; ASA, Plavix, leg revascularization    • Tobacco abuse     1 PPD     Past Surgical History:   Procedure Laterality Date   • ANGIOPLASTY     • APPENDECTOMY     • BACK SURGERY     • CAROTID STENT     • HAND SURGERY Right    • LEG REVASCULARIZATION Bilateral      Family History   Problem Relation Age of Onset   • Heart disease Other    • Diabetes Other    • Cancer Other    • Stroke Other    • Kidney disease Other    • Heart attack Maternal Grandfather    • Arrhythmia Paternal Grandmother    • Cancer Paternal  "Grandmother         colon   • Hypertension Mother    • Diabetes Mother    • Kidney disease Father    • Cancer Paternal Grandfather         colon     Social History     Tobacco Use   • Smoking status: Current Every Day Smoker     Packs/day: 0.50     Types: Cigarettes   • Smokeless tobacco: Never Used   • Tobacco comment: Trying to cut down.   Substance Use Topics   • Alcohol use: Yes     Alcohol/week: 8.4 oz     Types: 14 Cans of beer per week     Comment: 6 pk per week    • Drug use: No     Allergies   Allergen Reactions   • Codeine GI Intolerance       Current Outpatient Medications:   •  clopidogrel (PLAVIX) 75 MG tablet, Take 1 tablet by mouth Daily., Disp: 30 tablet, Rfl: 5  •  HYDROcodone-acetaminophen (NORCO)  MG per tablet, TK 2 TS PO TID, Disp: , Rfl: 0  •  methocarbamol (ROBAXIN) 750 MG tablet, TK 1 T PO TID, Disp: , Rfl: 3  •  metoprolol tartrate (LOPRESSOR) 25 MG tablet, Take 0.5 tablets by mouth Every 12 (Twelve) Hours., Disp: 60 tablet, Rfl: 5  •  simvastatin (ZOCOR) 20 MG tablet, TK 1 T PO QD, Disp: , Rfl: 3      Review of Systems   Constitutional: Negative.    HENT: Negative.    Eyes: Negative.    Respiratory: Negative.    Cardiovascular: Negative.         Claudication to RLE   Gastrointestinal: Negative.    Endocrine: Negative.    Genitourinary: Negative.    Musculoskeletal: Negative.    Skin: Negative.    Allergic/Immunologic: Negative.    Neurological: Negative.    Hematological: Negative.    Psychiatric/Behavioral: Negative.    All other systems reviewed and are negative.       /90 (BP Location: Left arm, Patient Position: Sitting, Cuff Size: Adult)   Pulse 91   Ht 193 cm (76\")   Wt 95.3 kg (210 lb)   SpO2 99%   BMI 25.56 kg/m²   Physical Exam   Constitutional: He is oriented to person, place, and time. He appears well-developed and well-nourished.   HENT:   Head: Normocephalic and atraumatic.   Eyes: Pupils are equal, round, and reactive to light. No scleral icterus.   Neck: " Normal range of motion. Neck supple. No thyromegaly present.   Cardiovascular: Normal rate, regular rhythm and normal heart sounds.   Right doppler signals   Pulmonary/Chest: Effort normal and breath sounds normal.   Abdominal: Soft. Bowel sounds are normal.   Musculoskeletal: Normal range of motion.   Neurological: He is alert and oriented to person, place, and time.   Skin: Skin is warm and dry.   Psychiatric: He has a normal mood and affect. His behavior is normal. Judgment and thought content normal.   Nursing note and vitals reviewed.        DIAGNOSTIC DATA: Noninvasive testing including ABIs show right SAMI 0.65 that decreases to 0.51 post exercise and left SAMI 1.00 decreases to 0.94 post exercise.      Patient Active Problem List   Diagnosis   • Pneumonia of both lower lobes due to infectious organism (CMS/[facility])   • Sinus tachycardia   • Tobacco abuse   • Emphysema of lung (CMS/[facility])   • Cough with hemoptysis   • Coffee ground emesis   • Paroxysmal SVT (supraventricular tachycardia) (CMS/[facility])   • Chest pain with low risk for cardiac etiology   • Hypercholesterolemia   • Hypertension   • Chronic back pain         ICD-10-CM ICD-9-CM   1. PAD (peripheral artery disease) (CMS/[facility]) I73.9 443.9   2. Hypercholesterolemia E78.00 272.0   3. Essential hypertension I10 401.9   4. Tobacco abuse Z72.0 305.1   5. Tobacco abuse counseling Z71.6 V65.42     305.1         PLAN: After thoroughly evaluating Sascha Oden Sr., I believe the best course of action is to proceed with a right lower extremity angiogram.  Risks of angiogram were discussed.  These include, but are not limited to, bleeding, infection, vessel damage, nerve damage, embolus, and loss of limb.  The patient understands these risks and wishes to proceed with procedure.  He does not need to hold Plavix prior to procedure.  I did  extensively on smoking cessation, and the patient was advised of the continued risks of smoking.  I provided over 10 minutes  counseling on this matter.  I did discuss vascular risk factors as they pertain to the progression of vascular disease including controlling his hypertension, hyperlipidemia, and smoking cessation.  Body mass index is 25.56 kg/m². The patient is to continue taking their medications as previously discussed.   This was all discussed in full with complete understanding.    Thank you for allowing me to participate in the care of your patient.  Please do not hesitate to call with any questions or concerns.  We will keep you aware of any further encounters with Sascha Oden Sr..      Sincerely Yours,        FORTINO Dubose

## 2019-01-29 NOTE — PROGRESS NOTES
"1/29/2019       Narendra Camara MD  [address] [place] [address] [address]  [place] [address]        Sascha Oden Sr.  1960    Chief Complaint   Patient presents with   • Follow-up     6 Month Follow Up For Peripheral Artery Disease. Test 01/29/2019 US pad ankle / brach ind ext comp. Patient denies any stroke like symptoms.    • other     Patient states right leg starting to give him trouble after he walks about 2-3 blocks then it gets bad. Patient states when it starts hurting it doesnt quit.        Dear Narendra Camara MD:    HPI     I had the pleasure of seeing you patient in the office today for follow up.  As you recall, the patient is a 58 y.o. male who we are currently following for bilateral lower extremity PAD.  It has since been occluded twice and he was taken back for revascularization on 2 other occasions, most recently February 2016. Unfortunately, he does continue to smoke cigarettes about 1/2 pack.  He is currently doing well and complaints of intermittent claudication to his right lower extremity.  He did have noninvasive testing performed today, which I did review in office.    Review of Systems   Constitutional: Negative.    HENT: Negative.    Eyes: Negative.    Respiratory: Negative.    Cardiovascular: Negative.         Claudication to RLE   Gastrointestinal: Negative.    Endocrine: Negative.    Genitourinary: Negative.    Musculoskeletal: Negative.    Skin: Negative.    Allergic/Immunologic: Negative.    Neurological: Negative.    Hematological: Negative.    Psychiatric/Behavioral: Negative.    All other systems reviewed and are negative.       /90 (BP Location: Left arm, Patient Position: Sitting, Cuff Size: Adult)   Pulse 91   Ht 193 cm (76\")   Wt 95.3 kg (210 lb)   SpO2 99%   BMI 25.56 kg/m²   Physical Exam   Constitutional: He is oriented to person, place, and time. He appears well-developed and well-nourished.   HENT:   Head: Normocephalic and atraumatic.   Eyes: Pupils are " equal, round, and reactive to light. No scleral icterus.   Neck: Normal range of motion. Neck supple. No thyromegaly present.   Cardiovascular: Normal rate, regular rhythm and normal heart sounds.   Right doppler signals   Pulmonary/Chest: Effort normal and breath sounds normal.   Abdominal: Soft. Bowel sounds are normal.   Musculoskeletal: Normal range of motion.   Neurological: He is alert and oriented to person, place, and time.   Skin: Skin is warm and dry.   Psychiatric: He has a normal mood and affect. His behavior is normal. Judgment and thought content normal.   Nursing note and vitals reviewed.        DIAGNOSTIC DATA: Noninvasive testing including ABIs show right SAMI 0.65 that decreases to 0.51 post exercise and left SAMI 1.00 decreases to 0.94 post exercise.      Patient Active Problem List   Diagnosis   • Pneumonia of both lower lobes due to infectious organism (CMS/[facility])   • Sinus tachycardia   • Tobacco abuse   • Emphysema of lung (CMS/[facility])   • Cough with hemoptysis   • Coffee ground emesis   • Paroxysmal SVT (supraventricular tachycardia) (CMS/[facility])   • Chest pain with low risk for cardiac etiology   • Hypercholesterolemia   • Hypertension   • Chronic back pain         ICD-10-CM ICD-9-CM   1. PAD (peripheral artery disease) (CMS/[facility]) I73.9 443.9   2. Hypercholesterolemia E78.00 272.0   3. Essential hypertension I10 401.9   4. Tobacco abuse Z72.0 305.1   5. Tobacco abuse counseling Z71.6 V65.42     305.1         PLAN: After thoroughly evaluating Sascha Oden Sr., I believe the best course of action is to proceed with a right lower extremity angiogram.  Risks of angiogram were discussed.  These include, but are not limited to, bleeding, infection, vessel damage, nerve damage, embolus, and loss of limb.  The patient understands these risks and wishes to proceed with procedure.  He does not need to hold Plavix prior to procedure.  I did  extensively on smoking cessation, and the patient was advised of  the continued risks of smoking.  I provided over 10 minutes counseling on this matter.  I did discuss vascular risk factors as they pertain to the progression of vascular disease including controlling his hypertension, hyperlipidemia, and smoking cessation.  Body mass index is 25.56 kg/m². The patient is to continue taking their medications as previously discussed.   This was all discussed in full with complete understanding.    Thank you for allowing me to participate in the care of your patient.  Please do not hesitate to call with any questions or concerns.  We will keep you aware of any further encounters with Sascha Oden Sr..      Sincerely Yours,        FORTINO Dubose

## 2019-01-30 ENCOUNTER — TELEPHONE (OUTPATIENT)
Dept: VASCULAR SURGERY | Facility: CLINIC | Age: 59
End: 2019-01-30

## 2019-01-30 PROBLEM — I73.9 PAD (PERIPHERAL ARTERY DISEASE) (HCC): Status: ACTIVE | Noted: 2019-01-30

## 2019-01-30 NOTE — TELEPHONE ENCOUNTER
Spoke with patient and advised that his procedure had scheduled for 2/11/2019 with an arrival time of 6:00 am.  His pre work is scheduled for 2/4/2019 with an arrival time of 12:45 pm.  Patient advised of location, time and place.  Patient expressed understanding for all that was discussed.

## 2019-02-04 ENCOUNTER — APPOINTMENT (OUTPATIENT)
Dept: PREADMISSION TESTING | Facility: HOSPITAL | Age: 59
End: 2019-02-04

## 2019-02-04 ENCOUNTER — HOSPITAL ENCOUNTER (OUTPATIENT)
Dept: GENERAL RADIOLOGY | Facility: HOSPITAL | Age: 59
Discharge: HOME | End: 2019-02-04
Admitting: NURSE PRACTITIONER

## 2019-02-04 VITALS
BODY MASS INDEX: 24.53 KG/M2 | HEIGHT: 74 IN | WEIGHT: 191.14 LBS | HEART RATE: 89 BPM | DIASTOLIC BLOOD PRESSURE: 80 MMHG | SYSTOLIC BLOOD PRESSURE: 127 MMHG | OXYGEN SATURATION: 95 %

## 2019-02-04 DIAGNOSIS — I73.9 PAD (PERIPHERAL ARTERY DISEASE) (HCC): ICD-10-CM

## 2019-02-04 LAB
ANION GAP SERPL CALCULATED.3IONS-SCNC: 9 MMOL/L (ref 4–13)
APTT PPP: 28.8 SECONDS (ref 24.1–34.8)
BASOPHILS # BLD AUTO: 0.08 10*3/MM3 (ref 0–0.2)
BASOPHILS NFR BLD AUTO: 1 % (ref 0–2)
BUN BLD-MCNC: 6 MG/DL (ref 5–21)
BUN/CREAT SERPL: 9.8 (ref 7–25)
CALCIUM SPEC-SCNC: 8.7 MG/DL (ref 8.4–10.4)
CHLORIDE SERPL-SCNC: 103 MMOL/L (ref 98–110)
CO2 SERPL-SCNC: 25 MMOL/L (ref 24–31)
CREAT BLD-MCNC: 0.61 MG/DL (ref 0.5–1.4)
DEPRECATED RDW RBC AUTO: 45.1 FL (ref 40–54)
EOSINOPHIL # BLD AUTO: 0.38 10*3/MM3 (ref 0–0.7)
EOSINOPHIL NFR BLD AUTO: 4.6 % (ref 0–4)
ERYTHROCYTE [DISTWIDTH] IN BLOOD BY AUTOMATED COUNT: 12.7 % (ref 12–15)
GFR SERPL CREATININE-BSD FRML MDRD: 137 ML/MIN/1.73
GLUCOSE BLD-MCNC: 83 MG/DL (ref 70–100)
HCT VFR BLD AUTO: 43.5 % (ref 40–52)
HGB BLD-MCNC: 14.9 G/DL (ref 14–18)
IMM GRANULOCYTES # BLD AUTO: 0.01 10*3/MM3 (ref 0–0.03)
IMM GRANULOCYTES NFR BLD AUTO: 0.1 % (ref 0–5)
INR PPP: 0.98 (ref 0.91–1.09)
LYMPHOCYTES # BLD AUTO: 2.82 10*3/MM3 (ref 0.72–4.86)
LYMPHOCYTES NFR BLD AUTO: 34.4 % (ref 15–45)
MCH RBC QN AUTO: 32.7 PG (ref 28–32)
MCHC RBC AUTO-ENTMCNC: 34.3 G/DL (ref 33–36)
MCV RBC AUTO: 95.4 FL (ref 82–95)
MONOCYTES # BLD AUTO: 0.8 10*3/MM3 (ref 0.19–1.3)
MONOCYTES NFR BLD AUTO: 9.8 % (ref 4–12)
NEUTROPHILS # BLD AUTO: 4.1 10*3/MM3 (ref 1.87–8.4)
NEUTROPHILS NFR BLD AUTO: 50.1 % (ref 39–78)
NRBC BLD AUTO-RTO: 0 /100 WBC (ref 0–0)
PLATELET # BLD AUTO: 252 10*3/MM3 (ref 130–400)
PMV BLD AUTO: 9 FL (ref 6–12)
POTASSIUM BLD-SCNC: 4.2 MMOL/L (ref 3.5–5.3)
PROTHROMBIN TIME: 13.3 SECONDS (ref 11.9–14.6)
RBC # BLD AUTO: 4.56 10*6/MM3 (ref 4.8–5.9)
SODIUM BLD-SCNC: 137 MMOL/L (ref 135–145)
WBC NRBC COR # BLD: 8.19 10*3/MM3 (ref 4.8–10.8)

## 2019-02-04 PROCEDURE — 85025 COMPLETE CBC W/AUTO DIFF WBC: CPT | Performed by: NURSE PRACTITIONER

## 2019-02-04 PROCEDURE — 85730 THROMBOPLASTIN TIME PARTIAL: CPT | Performed by: NURSE PRACTITIONER

## 2019-02-04 PROCEDURE — 93005 ELECTROCARDIOGRAM TRACING: CPT

## 2019-02-04 PROCEDURE — 80048 BASIC METABOLIC PNL TOTAL CA: CPT | Performed by: NURSE PRACTITIONER

## 2019-02-04 PROCEDURE — 85610 PROTHROMBIN TIME: CPT | Performed by: NURSE PRACTITIONER

## 2019-02-04 PROCEDURE — 71046 X-RAY EXAM CHEST 2 VIEWS: CPT

## 2019-02-04 PROCEDURE — 93010 ELECTROCARDIOGRAM REPORT: CPT | Performed by: INTERNAL MEDICINE

## 2019-02-04 PROCEDURE — 36415 COLL VENOUS BLD VENIPUNCTURE: CPT

## 2019-02-04 NOTE — DISCHARGE INSTRUCTIONS
DAY OF SURGERY INSTRUCTIONS        YOUR SURGEON: ***    PROCEDURE: ***right lower extremity angiogram    DATE OF SURGERY: ***2/11/19    ARRIVAL TIME: AS DIRECTED BY OFFICE    YOU MAY TAKE THE FOLLOWING MEDICATION(S) THE MORNING OF SURGERY WITH A SIP OF WATER: ***norco, metoprolol (or as directed by your doctor)                MANAGING PAIN AFTER SURGERY    We know you are probably wondering what your pain will be like after surgery.  Following surgery it is unrealistic to expect you will not have pain.   Pain is how our bodies let us know that something is wrong or cautions us to be careful.  That said, our goal is to make your pain tolerable.    Methods we may use to treat your pain include (oral or IV medications, PCAs, epidurals, nerve blocks, etc.)   While some procedures require IV pain medications for a short time after surgery, transitioning to pain medications by mouth allows for better management of pain.   Your nurse will encourage you to take oral pain medications whenever possible.  IV medications work almost immediately, but only last a short while.  Taking medications by mouth allows for a more constant level of medication in your blood stream for a longer period of time.      Once your pain is out of control it is harder to get back under control.  It is important you are aware when your next dose of pain medication is due.  If you are admitted, your nurse may write the time of your next dose on the white board in your room to help you remember.      We are interested in your pain and encourage you to inform us about aggravating factors during your visit.   Many times a simple repositioning every few hours can make a big difference.    If your physician says it is okay, do not let your pain prevent you from getting out of bed. Be sure to call your nurse for assistance prior to getting up so you do not fall.      Before surgery, please decide your tolerable pain goal.  These faces help  describe the pain ratings we use on a 0-10 scale.   Be prepared to tell us your goal and whether or not you take pain or anxiety medications at home.          BEFORE YOU COME TO THE HOSPITAL  (Pre-op instructions)  • Do not eat, drink, smoke or chew gum after midnight the night before surgery.  This also includes no mints.  • Morning of surgery take only the medicines you have been instructed with a sip of water unless otherwise instructed  by your physician.  • Do not shave, wear makeup or dark nail polish.  • Remove all jewelry including rings.  • Leave anything you consider valuable at home.  • Leave your suitcase in the car until after your surgery.  • Bring the following with you if applicable:  o Picture ID and insurance, Medicare or Medicaid cards  o Co-pay/deductible required by insurance (cash, check, credit card)  o Copy of advance directive, living will or power-of- documents if not brought to PAT  o CPAP or BIPAP mask and tubing  o Relaxation aids (MP3 player, book, magazine)  • On the day of surgery check in at registration located at the main entrance of the hospital.       Outpatient Surgery Guidelines, Adult  Outpatient procedures are those for which the person having the procedure is allowed to go home the same day as the procedure. Various procedures are done on an outpatient basis. You should follow some general guidelines if you will be having an outpatient procedure.  LET YOUR HEALTH CARE PROVIDER KNOW ABOUT:  · Any allergies you have.  · All medicines you are taking, including vitamins, herbs, eye drops, creams, and over-the-counter medicines.  · Previous problems you or members of your family have had with the use of anesthetics.  · Any blood disorders you have.  · Previous surgeries you have had.  · Medical conditions you have.  RISKS AND COMPLICATIONS  Your health care provider will discuss possible risks and complications with you before surgery. Common risks and complications  include:    · Problems due to the use of anesthetics.  · Blood loss and replacement (does not apply to minor surgical procedures).  · Temporary increase in pain due to surgery.  · Uncorrected pain or problems that the surgery was meant to correct.  · Infection.  · New damage.  BEFORE THE PROCEDURE  · Ask your health care provider about changing or stopping your regular medicines. You may need to stop taking certain medicines in the days or weeks before the procedure.  · Stop smoking at least 2 weeks before surgery. This lowers your risk for complications during and after surgery. Ask your health care provider for help with this if needed.  · Eat your usual meals and a light supper the day before surgery. Continue fluid intake. Do not drink alcohol.  · Do not eat or drink after midnight the night before your surgery.   · Arrange for someone to take you home and to stay with you for 24 hours after the procedure. Medicine given for your procedure may affect your ability to drive or to care for yourself.  · Call your health care provider's office if you develop an illness or problem that may prevent you from safely having your procedure.  AFTER THE PROCEDURE  After surgery, you will be taken to a recovery area, where your progress will be monitored. If there are no complications, you will be allowed to go home when you are awake, stable, and taking fluids well. You may have numbness around the surgical site. Healing will take some time. You will have tenderness at the surgical site and may have some swelling and bruising. You may also have some nausea.  HOME CARE INSTRUCTIONS  · Do not drive for 24 hours, or as directed by your health care provider. Do not drive while taking prescription pain medicines.  · Do not drink alcohol for 24 hours.  · Do not make important decisions or sign legal documents for 24 hours.  · You may resume a normal diet and activities as directed.  · Do not lift anything heavier than 10 pounds  (4.5 kg) or play contact sports until your health care provider says it is okay.  · Change your bandages (dressings) as directed.  · Only take over-the-counter or prescription medicines as directed by your health care provider.  · Follow up with your health care provider as directed.  SEEK MEDICAL CARE IF:  · You have increased bleeding (more than a small spot) from the surgical site.  · You have redness, swelling, or increasing pain in the wound.  · You see pus coming from the wound.  · You have a fever.  · You notice a bad smell coming from the wound or dressing.  · You feel lightheaded or faint.  · You develop a rash.  · You have trouble breathing.  · You develop allergies.  MAKE SURE YOU:  · Understand these instructions.  · Will watch your condition.  · Will get help right away if you are not doing well or get worse.     This information is not intended to replace advice given to you by your health care provider. Make sure you discuss any questions you have with your health care provider.     Document Released: 09/12/2002 Document Revised: 05/03/2016 Document Reviewed: 05/22/2014  [employer] Interactive Patient Education ©2016 [employer]       Fall Prevention in Hospitals, Adult  As a hospital patient, your condition and the treatments you receive can increase your risk for falls. Some additional risk factors for falls in a hospital include:  · Being in an unfamiliar environment.  · Being on bed rest.  · Your surgery.  · Taking certain medicines.  · Your tubing requirements, such as intravenous (IV) therapy or catheters.  It is important that you learn how to decrease fall risks while at the hospital. Below are important tips that can help prevent falls.  SAFETY TIPS FOR PREVENTING FALLS  Talk about your risk of falling.  · Ask your health care provider why you are at risk for falling. Is it your medicine, illness, tubing placement, or something else?  · Make a plan with your health care provider to keep you  safe from falls.  · Ask your health care provider or pharmacist about side effects of your medicines. Some medicines can make you dizzy or affect your coordination.  Ask for help.  · Ask for help before getting out of bed. You may need to press your call button.  · Ask for assistance in getting safely to the toilet.  · Ask for a walker or cane to be put at your bedside. Ask that most of the side rails on your bed be placed up before your health care provider leaves the room.  · Ask family or friends to sit with you.  · Ask for things that are out of your reach, such as your glasses, hearing aids, telephone, bedside table, or call button.  Follow these tips to avoid falling:  · Stay lying or seated, rather than standing, while waiting for help.  · Wear rubber-soled slippers or shoes whenever you walk in the hospital.  · Avoid quick, sudden movements.  ¨ Change positions slowly.  ¨ Sit on the side of your bed before standing.  ¨ Stand up slowly and wait before you start to walk.  · Let your health care provider know if there is a spill on the floor.  · Pay careful attention to the medical equipment, electrical cords, and tubes around you.  · When you need help, use your call button by your bed or in the bathroom. Wait for one of your health care providers to help you.  · If you feel dizzy or unsure of your footing, return to bed and wait for assistance.  · Avoid being distracted by the TV, telephone, or another person in your room.  · Do not lean or support yourself on rolling objects, such as IV poles or bedside tables.     This information is not intended to replace advice given to you by your health care provider. Make sure you discuss any questions you have with your health care provider.     Document Released: 12/15/2001 Document Revised: 01/08/2016 Document Reviewed: 08/25/2013  [employer] Interactive Patient Education ©2016 [employer]       Surgical Site Infections FAQs  What is a Surgical Site Infection  (SSI)?  A surgical site infection is an infection that occurs after surgery in the part of the body where the surgery took place. Most patients who have surgery do not develop an infection. However, infections develop in about 1 to 3 out of every 100 patients who have surgery.  Some of the common symptoms of a surgical site infection are:  · Redness and pain around the area where you had surgery  · Drainage of cloudy fluid from your surgical wound  · Fever  Can SSIs be treated?  Yes. Most surgical site infections can be treated with antibiotics. The antibiotic given to you depends on the bacteria (germs) causing the infection. Sometimes patients with SSIs also need another surgery to treat the infection.  What are some of the things that hospitals are doing to prevent SSIs?  To prevent SSIs, doctors, nurses, and other healthcare providers:  · Clean their hands and arms up to their elbows with an antiseptic agent just before the surgery.  · Clean their hands with soap and water or an alcohol-based hand rub before and after caring for each patient.  · May remove some of your hair immediately before your surgery using electric clippers if the hair is in the same area where the procedure will occur. They should not shave you with a razor.  · Wear special hair covers, masks, gowns, and gloves during surgery to keep the surgery area clean.  · Give you antibiotics before your surgery starts. In most cases, you should get antibiotics within 60 minutes before the surgery starts and the antibiotics should be stopped within 24 hours after surgery.  · Clean the skin at the site of your surgery with a special soap that kills germs.  What can I do to help prevent SSIs?  Before your surgery:  · Tell your doctor about other medical problems you may have. Health problems such as allergies, diabetes, and obesity could affect your surgery and your treatment.  · Quit smoking. Patients who smoke get more infections. Talk to your doctor  about how you can quit before your surgery.  · Do not shave near where you will have surgery. Shaving with a razor can irritate your skin and make it easier to develop an infection.  At the time of your surgery:  · Speak up if someone tries to shave you with a razor before surgery. Ask why you need to be shaved and talk with your surgeon if you have any concerns.  · Ask if you will get antibiotics before surgery.  After your surgery:  · Make sure that your healthcare providers clean their hands before examining you, either with soap and water or an alcohol-based hand rub.  · If you do not see your providers clean their hands, please ask them to do so.  · Family and friends who visit you should not touch the surgical wound or dressings.  · Family and friends should clean their hands with soap and water or an alcohol-based hand rub before and after visiting you. If you do not see them clean their hands, ask them to clean their hands.  What do I need to do when I go home from the hospital?  · Before you go home, your doctor or nurse should explain everything you need to know about taking care of your wound. Make sure you understand how to care for your wound before you leave the hospital.  · Always clean your hands before and after caring for your wound.  · Before you go home, make sure you know who to contact if you have questions or problems after you get home.  · If you have any symptoms of an infection, such as redness and pain at the surgery site, drainage, or fever, call your doctor immediately.  If you have additional questions, please ask your doctor or nurse.  Developed and co-sponsored by The Society for Healthcare Epidemiology of [place] (SHEA); Infectious Diseases Society of [place] (IDSA); American Hospital Association; Association for Professionals in Infection Control and Epidemiology (APIC); Centers for Disease Control and Prevention (CDC); and The Joint Commission.     This information is not intended  to replace advice given to you by your health care provider. Make sure you discuss any questions you have with your health care provider.     Document Released: 12/23/2014 Document Revised: 01/08/2016 Document Reviewed: 03/02/2016  [employer] Interactive Patient Education ©2016 [employer].       [facility]  CHG 4% Patient Instruction Sheet    Preparing the Skin Before Surgery  Preparing or “prepping” skin before surgery can reduce the risk of infection at the surgical site. To make the process easier,[facility] has chosen 4% Chlorhexidine Gluconate (CHG) antiseptic solution.   The steps below outline the prepping process and should be carefully followed.                                                                                                                                                      Prep the skin at the following time(s):                                                      We recommend you shower the night before surgery, and again the morning of surgery with the 4% CHG antiseptic solution using half of the bottle and a cloth each time.  Dress in clean clothes/sleepwear after showering.  See instructions below for application.          Do not apply any lotions or moisturizers.       Do not shave the area to be prepped for at least 2 days prior to surgery.    Clipping the hair may be done immediately prior to your surgery at the hospital    if needed.    Directions:  Thoroughly rinse your body with water.  Apply 4% CHG to a cloth and wash skin gently, paying special attention to the operative site.  Rinse again thoroughly.  Once you have begun using this product do not apply anything else to your skin. If itching or redness persists, rinse affected areas and discontinue use.    When using this product:  • Keep out of eyes, ears, and mouth.  • If solution should contact these areas, rinse out promptly and thoroughly with water.  • For external use only.  • Do not use in genital area, on your  face or head.      PATIENT/FAMILY/RESPONSIBLE PARTY VERBALIZES UNDERSTANDING OF ABOVE EDUCATION.  COPY OF PAIN SCALE GIVEN AND REVIEWED WITH VERBALIZED UNDERSTANDING.

## 2019-02-11 ENCOUNTER — ANESTHESIA EVENT (OUTPATIENT)
Dept: PERIOP | Facility: HOSPITAL | Age: 59
End: 2019-02-11

## 2019-02-11 ENCOUNTER — APPOINTMENT (OUTPATIENT)
Dept: INTERVENTIONAL RADIOLOGY/VASCULAR | Facility: HOSPITAL | Age: 59
End: 2019-02-11

## 2019-02-11 ENCOUNTER — ANESTHESIA (OUTPATIENT)
Dept: PERIOP | Facility: HOSPITAL | Age: 59
End: 2019-02-11

## 2019-02-11 ENCOUNTER — HOSPITAL ENCOUNTER (OUTPATIENT)
Facility: HOSPITAL | Age: 59
Setting detail: HOSPITAL OUTPATIENT SURGERY
Discharge: HOME | End: 2019-02-11
Attending: SURGERY | Admitting: SURGERY

## 2019-02-11 VITALS
OXYGEN SATURATION: 91 % | TEMPERATURE: 98.1 F | HEART RATE: 106 BPM | RESPIRATION RATE: 18 BRPM | SYSTOLIC BLOOD PRESSURE: 142 MMHG | DIASTOLIC BLOOD PRESSURE: 96 MMHG

## 2019-02-11 DIAGNOSIS — I73.9 PAD (PERIPHERAL ARTERY DISEASE) (HCC): ICD-10-CM

## 2019-02-11 LAB
ABO GROUP BLD: NORMAL
BLD GP AB SCN SERPL QL: NEGATIVE
RH BLD: POSITIVE
T&S EXPIRATION DATE: NORMAL

## 2019-02-11 PROCEDURE — 94799 UNLISTED PULMONARY SVC/PX: CPT

## 2019-02-11 PROCEDURE — C1884 EMBOLIZATION PROTECT SYST: HCPCS | Performed by: SURGERY

## 2019-02-11 PROCEDURE — 25010000002 DEXAMETHASONE PER 1 MG: Performed by: NURSE ANESTHETIST, CERTIFIED REGISTERED

## 2019-02-11 PROCEDURE — C1887 CATHETER, GUIDING: HCPCS | Performed by: SURGERY

## 2019-02-11 PROCEDURE — C1894 INTRO/SHEATH, NON-LASER: HCPCS | Performed by: SURGERY

## 2019-02-11 PROCEDURE — C1769 GUIDE WIRE: HCPCS | Performed by: SURGERY

## 2019-02-11 PROCEDURE — C2623 CATH, TRANSLUMIN, DRUG-COAT: HCPCS | Performed by: SURGERY

## 2019-02-11 PROCEDURE — 86901 BLOOD TYPING SEROLOGIC RH(D): CPT | Performed by: NURSE PRACTITIONER

## 2019-02-11 PROCEDURE — 25010000002 PROPOFOL 1000 MG/ML EMULSION: Performed by: NURSE ANESTHETIST, CERTIFIED REGISTERED

## 2019-02-11 PROCEDURE — C1714 CATH, TRANS ATHERECTOMY, DIR: HCPCS | Performed by: SURGERY

## 2019-02-11 PROCEDURE — 25010000002 HYDROMORPHONE PER 4 MG: Performed by: NURSE ANESTHETIST, CERTIFIED REGISTERED

## 2019-02-11 PROCEDURE — 86900 BLOOD TYPING SEROLOGIC ABO: CPT | Performed by: NURSE PRACTITIONER

## 2019-02-11 PROCEDURE — 76000 FLUOROSCOPY <1 HR PHYS/QHP: CPT

## 2019-02-11 PROCEDURE — 36246 INS CATH ABD/L-EXT ART 2ND: CPT | Performed by: SURGERY

## 2019-02-11 PROCEDURE — C1725 CATH, TRANSLUMIN NON-LASER: HCPCS | Performed by: SURGERY

## 2019-02-11 PROCEDURE — 75625 CONTRAST EXAM ABDOMINL AORTA: CPT

## 2019-02-11 PROCEDURE — 25010000002 PROPOFOL 10 MG/ML EMULSION: Performed by: NURSE ANESTHETIST, CERTIFIED REGISTERED

## 2019-02-11 PROCEDURE — 75710 ARTERY X-RAYS ARM/LEG: CPT

## 2019-02-11 PROCEDURE — 25010000002 HEPARIN (PORCINE) PER 1000 UNITS: Performed by: NURSE ANESTHETIST, CERTIFIED REGISTERED

## 2019-02-11 PROCEDURE — 75710 ARTERY X-RAYS ARM/LEG: CPT | Performed by: SURGERY

## 2019-02-11 PROCEDURE — C1760 CLOSURE DEV, VASC: HCPCS | Performed by: SURGERY

## 2019-02-11 PROCEDURE — 86850 RBC ANTIBODY SCREEN: CPT | Performed by: NURSE PRACTITIONER

## 2019-02-11 PROCEDURE — 25010000002 IOPAMIDOL 61 % SOLUTION: Performed by: SURGERY

## 2019-02-11 PROCEDURE — 25010000002 HEPARIN (PORCINE) PER 1000 UNITS: Performed by: SURGERY

## 2019-02-11 PROCEDURE — 37225 PR REVSC OPN/PRQ FEM/POP W/ATHRC/ANGIOP SM VSL: CPT | Performed by: SURGERY

## 2019-02-11 PROCEDURE — 25010000002 FENTANYL CITRATE (PF) 100 MCG/2ML SOLUTION: Performed by: NURSE ANESTHETIST, CERTIFIED REGISTERED

## 2019-02-11 RX ORDER — METOCLOPRAMIDE HYDROCHLORIDE 5 MG/ML
5 INJECTION INTRAMUSCULAR; INTRAVENOUS
Status: DISCONTINUED | OUTPATIENT
Start: 2019-02-11 | End: 2019-02-11 | Stop reason: HOSPADM

## 2019-02-11 RX ORDER — BUPIVACAINE HCL/0.9 % NACL/PF 0.1 %
2 PLASTIC BAG, INJECTION (ML) EPIDURAL ONCE
Status: DISCONTINUED | OUTPATIENT
Start: 2019-02-11 | End: 2019-02-11 | Stop reason: HOSPADM

## 2019-02-11 RX ORDER — SODIUM CHLORIDE 0.9 % (FLUSH) 0.9 %
1-10 SYRINGE (ML) INJECTION AS NEEDED
Status: DISCONTINUED | OUTPATIENT
Start: 2019-02-11 | End: 2019-02-11 | Stop reason: HOSPADM

## 2019-02-11 RX ORDER — ONDANSETRON 2 MG/ML
4 INJECTION INTRAMUSCULAR; INTRAVENOUS ONCE AS NEEDED
Status: DISCONTINUED | OUTPATIENT
Start: 2019-02-11 | End: 2019-02-11 | Stop reason: HOSPADM

## 2019-02-11 RX ORDER — DEXAMETHASONE SODIUM PHOSPHATE 4 MG/ML
INJECTION, SOLUTION INTRA-ARTICULAR; INTRALESIONAL; INTRAMUSCULAR; INTRAVENOUS; SOFT TISSUE AS NEEDED
Status: DISCONTINUED | OUTPATIENT
Start: 2019-02-11 | End: 2019-02-11 | Stop reason: SURG

## 2019-02-11 RX ORDER — IPRATROPIUM BROMIDE AND ALBUTEROL SULFATE 2.5; .5 MG/3ML; MG/3ML
3 SOLUTION RESPIRATORY (INHALATION) ONCE
Status: COMPLETED | OUTPATIENT
Start: 2019-02-11 | End: 2019-02-11

## 2019-02-11 RX ORDER — MIDAZOLAM HYDROCHLORIDE 1 MG/ML
1 INJECTION INTRAMUSCULAR; INTRAVENOUS
Status: DISCONTINUED | OUTPATIENT
Start: 2019-02-11 | End: 2019-02-11 | Stop reason: HOSPADM

## 2019-02-11 RX ORDER — MIDAZOLAM HYDROCHLORIDE 1 MG/ML
2 INJECTION INTRAMUSCULAR; INTRAVENOUS
Status: DISCONTINUED | OUTPATIENT
Start: 2019-02-11 | End: 2019-02-11 | Stop reason: HOSPADM

## 2019-02-11 RX ORDER — SODIUM CHLORIDE, SODIUM LACTATE, POTASSIUM CHLORIDE, CALCIUM CHLORIDE 600; 310; 30; 20 MG/100ML; MG/100ML; MG/100ML; MG/100ML
1000 INJECTION, SOLUTION INTRAVENOUS CONTINUOUS
Status: DISCONTINUED | OUTPATIENT
Start: 2019-02-11 | End: 2019-02-11 | Stop reason: HOSPADM

## 2019-02-11 RX ORDER — HYDROCODONE BITARTRATE AND ACETAMINOPHEN 7.5; 325 MG/1; MG/1
1 TABLET ORAL ONCE AS NEEDED
Status: DISCONTINUED | OUTPATIENT
Start: 2019-02-11 | End: 2019-02-11 | Stop reason: HOSPADM

## 2019-02-11 RX ORDER — NALOXONE HCL 0.4 MG/ML
0.4 VIAL (ML) INJECTION AS NEEDED
Status: DISCONTINUED | OUTPATIENT
Start: 2019-02-11 | End: 2019-02-11 | Stop reason: HOSPADM

## 2019-02-11 RX ORDER — LABETALOL HYDROCHLORIDE 5 MG/ML
5 INJECTION, SOLUTION INTRAVENOUS
Status: DISCONTINUED | OUTPATIENT
Start: 2019-02-11 | End: 2019-02-11 | Stop reason: HOSPADM

## 2019-02-11 RX ORDER — GLYCOPYRROLATE 0.2 MG/ML
INJECTION INTRAMUSCULAR; INTRAVENOUS AS NEEDED
Status: DISCONTINUED | OUTPATIENT
Start: 2019-02-11 | End: 2019-02-11 | Stop reason: SURG

## 2019-02-11 RX ORDER — SODIUM CHLORIDE 0.9 % (FLUSH) 0.9 %
3 SYRINGE (ML) INJECTION AS NEEDED
Status: DISCONTINUED | OUTPATIENT
Start: 2019-02-11 | End: 2019-02-11 | Stop reason: HOSPADM

## 2019-02-11 RX ORDER — HEPARIN SODIUM 1000 [USP'U]/ML
INJECTION, SOLUTION INTRAVENOUS; SUBCUTANEOUS AS NEEDED
Status: DISCONTINUED | OUTPATIENT
Start: 2019-02-11 | End: 2019-02-11 | Stop reason: SURG

## 2019-02-11 RX ORDER — IPRATROPIUM BROMIDE AND ALBUTEROL SULFATE 2.5; .5 MG/3ML; MG/3ML
3 SOLUTION RESPIRATORY (INHALATION) ONCE AS NEEDED
Status: DISCONTINUED | OUTPATIENT
Start: 2019-02-11 | End: 2019-02-11 | Stop reason: HOSPADM

## 2019-02-11 RX ORDER — IBUPROFEN 600 MG/1
600 TABLET ORAL ONCE AS NEEDED
Status: DISCONTINUED | OUTPATIENT
Start: 2019-02-11 | End: 2019-02-11 | Stop reason: HOSPADM

## 2019-02-11 RX ORDER — SODIUM CHLORIDE, SODIUM LACTATE, POTASSIUM CHLORIDE, CALCIUM CHLORIDE 600; 310; 30; 20 MG/100ML; MG/100ML; MG/100ML; MG/100ML
100 INJECTION, SOLUTION INTRAVENOUS CONTINUOUS
Status: DISCONTINUED | OUTPATIENT
Start: 2019-02-11 | End: 2019-02-11 | Stop reason: HOSPADM

## 2019-02-11 RX ORDER — MEPERIDINE HYDROCHLORIDE 25 MG/ML
12.5 INJECTION INTRAMUSCULAR; INTRAVENOUS; SUBCUTANEOUS
Status: DISCONTINUED | OUTPATIENT
Start: 2019-02-11 | End: 2019-02-11 | Stop reason: HOSPADM

## 2019-02-11 RX ORDER — HYDROMORPHONE HCL 110MG/55ML
PATIENT CONTROLLED ANALGESIA SYRINGE INTRAVENOUS AS NEEDED
Status: DISCONTINUED | OUTPATIENT
Start: 2019-02-11 | End: 2019-02-11 | Stop reason: SURG

## 2019-02-11 RX ORDER — OXYCODONE AND ACETAMINOPHEN 10; 325 MG/1; MG/1
1 TABLET ORAL ONCE AS NEEDED
Status: DISCONTINUED | OUTPATIENT
Start: 2019-02-11 | End: 2019-02-11 | Stop reason: HOSPADM

## 2019-02-11 RX ORDER — FENTANYL CITRATE 50 UG/ML
25 INJECTION, SOLUTION INTRAMUSCULAR; INTRAVENOUS AS NEEDED
Status: DISCONTINUED | OUTPATIENT
Start: 2019-02-11 | End: 2019-02-11 | Stop reason: HOSPADM

## 2019-02-11 RX ORDER — OXYCODONE AND ACETAMINOPHEN 7.5; 325 MG/1; MG/1
2 TABLET ORAL EVERY 4 HOURS PRN
Status: DISCONTINUED | OUTPATIENT
Start: 2019-02-11 | End: 2019-02-11 | Stop reason: HOSPADM

## 2019-02-11 RX ORDER — SODIUM CHLORIDE 0.9 % (FLUSH) 0.9 %
3 SYRINGE (ML) INJECTION EVERY 12 HOURS SCHEDULED
Status: DISCONTINUED | OUTPATIENT
Start: 2019-02-11 | End: 2019-02-11 | Stop reason: HOSPADM

## 2019-02-11 RX ORDER — PROPOFOL 10 MG/ML
VIAL (ML) INTRAVENOUS AS NEEDED
Status: DISCONTINUED | OUTPATIENT
Start: 2019-02-11 | End: 2019-02-11 | Stop reason: SURG

## 2019-02-11 RX ORDER — ACETAMINOPHEN 500 MG
1000 TABLET ORAL ONCE
Status: COMPLETED | OUTPATIENT
Start: 2019-02-11 | End: 2019-02-11

## 2019-02-11 RX ORDER — FENTANYL CITRATE 50 UG/ML
INJECTION, SOLUTION INTRAMUSCULAR; INTRAVENOUS AS NEEDED
Status: DISCONTINUED | OUTPATIENT
Start: 2019-02-11 | End: 2019-02-11 | Stop reason: SURG

## 2019-02-11 RX ORDER — FAMOTIDINE 10 MG/ML
20 INJECTION, SOLUTION INTRAVENOUS
Status: DISCONTINUED | OUTPATIENT
Start: 2019-02-11 | End: 2019-02-11 | Stop reason: HOSPADM

## 2019-02-11 RX ORDER — BUPIVACAINE HYDROCHLORIDE 5 MG/ML
INJECTION, SOLUTION EPIDURAL; INTRACAUDAL AS NEEDED
Status: DISCONTINUED | OUTPATIENT
Start: 2019-02-11 | End: 2019-02-11 | Stop reason: HOSPADM

## 2019-02-11 RX ADMIN — PROPOFOL 150 MCG/KG/MIN: 10 INJECTION, EMULSION INTRAVENOUS at 10:11

## 2019-02-11 RX ADMIN — DEXAMETHASONE SODIUM PHOSPHATE 4 MG: 4 INJECTION, SOLUTION INTRAMUSCULAR; INTRAVENOUS at 11:54

## 2019-02-11 RX ADMIN — GLYCOPYRROLATE 0.1 MG: 0.2 INJECTION, SOLUTION INTRAMUSCULAR; INTRAVENOUS at 10:18

## 2019-02-11 RX ADMIN — HEPARIN SODIUM 1000 UNITS: 1000 INJECTION, SOLUTION INTRAVENOUS; SUBCUTANEOUS at 11:30

## 2019-02-11 RX ADMIN — PROPOFOL 50 MG: 10 INJECTION, EMULSION INTRAVENOUS at 10:11

## 2019-02-11 RX ADMIN — HEPARIN SODIUM 1000 UNITS: 1000 INJECTION, SOLUTION INTRAVENOUS; SUBCUTANEOUS at 10:20

## 2019-02-11 RX ADMIN — HYDROMORPHONE HYDROCHLORIDE 2 MG: 2 INJECTION, SOLUTION INTRAMUSCULAR; INTRAVENOUS; SUBCUTANEOUS at 10:23

## 2019-02-11 RX ADMIN — IPRATROPIUM BROMIDE AND ALBUTEROL SULFATE 3 ML: 2.5; .5 SOLUTION RESPIRATORY (INHALATION) at 10:03

## 2019-02-11 RX ADMIN — FAMOTIDINE 20 MG: 10 INJECTION, SOLUTION INTRAVENOUS at 10:03

## 2019-02-11 RX ADMIN — SODIUM CHLORIDE, POTASSIUM CHLORIDE, SODIUM LACTATE AND CALCIUM CHLORIDE 1000 ML: 600; 310; 30; 20 INJECTION, SOLUTION INTRAVENOUS at 07:24

## 2019-02-11 RX ADMIN — ACETAMINOPHEN 1000 MG: 500 TABLET, FILM COATED ORAL at 10:03

## 2019-02-11 RX ADMIN — HEPARIN SODIUM 5000 UNITS: 1000 INJECTION, SOLUTION INTRAVENOUS; SUBCUTANEOUS at 10:30

## 2019-02-11 RX ADMIN — FENTANYL CITRATE 100 MCG: 50 INJECTION, SOLUTION INTRAMUSCULAR; INTRAVENOUS at 10:19

## 2019-02-11 RX ADMIN — FENTANYL CITRATE 100 MCG: 50 INJECTION, SOLUTION INTRAMUSCULAR; INTRAVENOUS at 10:11

## 2019-02-11 RX ADMIN — OXYCODONE HYDROCHLORIDE AND ACETAMINOPHEN 2 TABLET: 7.5; 325 TABLET ORAL at 13:18

## 2019-02-11 NOTE — ANESTHESIA PREPROCEDURE EVALUATION
Anesthesia Evaluation     Patient summary reviewed and Nursing notes reviewed   no history of anesthetic complications:  NPO Solid Status: > 8 hours  NPO Liquid Status: > 8 hours           Airway   Mallampati: I  TM distance: >3 FB  Neck ROM: full  No difficulty expected  Dental      Pulmonary    (+) a smoker, COPD,   Cardiovascular     ECG reviewed  Patient on routine beta blocker and Beta blocker given within 24 hours of surgery    (+) hypertension, dysrhythmias (SVT), PVD, hyperlipidemia,       Neuro/Psych  GI/Hepatic/Renal/Endo      Musculoskeletal     (+) chronic pain,   Abdominal    Substance History      OB/GYN          Other   (+) arthritis                     Anesthesia Plan    ASA 3     MAC     intravenous induction   Anesthetic plan, all risks, benefits, and alternatives have been provided, discussed and informed consent has been obtained with: patient.

## 2019-02-25 ENCOUNTER — OFFICE VISIT (OUTPATIENT)
Dept: VASCULAR SURGERY | Facility: CLINIC | Age: 59
End: 2019-02-25

## 2019-02-25 VITALS
DIASTOLIC BLOOD PRESSURE: 82 MMHG | HEIGHT: 76 IN | WEIGHT: 195 LBS | SYSTOLIC BLOOD PRESSURE: 122 MMHG | OXYGEN SATURATION: 98 % | HEART RATE: 81 BPM | BODY MASS INDEX: 23.75 KG/M2

## 2019-02-25 DIAGNOSIS — E78.00 HYPERCHOLESTEROLEMIA: ICD-10-CM

## 2019-02-25 DIAGNOSIS — I10 ESSENTIAL HYPERTENSION: ICD-10-CM

## 2019-02-25 DIAGNOSIS — I73.9 PAD (PERIPHERAL ARTERY DISEASE) (HCC): Primary | ICD-10-CM

## 2019-02-25 DIAGNOSIS — I65.23 BILATERAL CAROTID ARTERY STENOSIS: ICD-10-CM

## 2019-02-25 DIAGNOSIS — Z72.0 TOBACCO ABUSE: ICD-10-CM

## 2019-02-25 PROCEDURE — 99213 OFFICE O/P EST LOW 20 MIN: CPT | Performed by: NURSE PRACTITIONER

## 2019-02-25 NOTE — PROGRESS NOTES
"2/25/2019       Narendra Camara MD  [address] [place] [address] [address]  [place] [address]        Sascha Oden Sr.  1960    Chief Complaint   Patient presents with   • Follow-up     2 Week Post-Op Follow Up For RIGHT LOWER EXTREMITY ANGIOGRAM. Patient denies any stroke like symptoms.        Dear Narendra Camara MD:    HPI     I had the pleasure of seeing you patient in the office today for follow up.  As you recall, the patient is a 58 y.o. male who we are currently following for bilateral lower extremity PAD.  It has since been occluded twice and he was taken back for revascularization on 2 other occasions, most recently February 2016. Unfortunately, he does continue to smoke cigarettes about 1 1/2 pack.  He was recently having complaints of claudication to his right lower extremity.  He did undergo right lower extremity angiogram with atherectomy of the entire superficial femoral artery and distal superficial femoral artery stent with angioplasty of SFA and proximal popliteal artery.  He did also have kissing balloon angioplasty of the profunda for Juice and SFA takeoff.  He reports that his claudication has resolved and his leg feels great.  His groin has healed.  He did have noninvasive testing performed today, which I did review in office.    Review of Systems   Constitutional: Negative.    HENT: Negative.    Eyes: Negative.    Respiratory: Negative.    Cardiovascular: Negative.    Gastrointestinal: Negative.    Endocrine: Negative.    Genitourinary: Negative.    Musculoskeletal: Negative.    Skin: Negative.    Allergic/Immunologic: Negative.    Neurological: Negative.    Hematological: Negative.    Psychiatric/Behavioral: Negative.    All other systems reviewed and are negative.       /82 (BP Location: Right arm, Patient Position: Sitting, Cuff Size: Adult)   Pulse 81   Ht 193 cm (76\")   Wt 88.5 kg (195 lb)   SpO2 98%   BMI 23.74 kg/m²   Physical Exam   Constitutional: He is oriented to person, " place, and time. He appears well-developed and well-nourished.   HENT:   Head: Normocephalic and atraumatic.   Eyes: Pupils are equal, round, and reactive to light. No scleral icterus.   Neck: Normal range of motion. Neck supple. No thyromegaly present.   Cardiovascular: Normal rate, regular rhythm and normal heart sounds.   Right doppler signals   Pulmonary/Chest: Effort normal and breath sounds normal.   Abdominal: Soft. Bowel sounds are normal.   Musculoskeletal: Normal range of motion.   Neurological: He is alert and oriented to person, place, and time.   Skin: Skin is warm and dry.   Psychiatric: He has a normal mood and affect. His behavior is normal. Judgment and thought content normal.   Nursing note and vitals reviewed.        Patient Active Problem List   Diagnosis   • Pneumonia of both lower lobes due to infectious organism (CMS/[facility])   • Sinus tachycardia   • Tobacco abuse   • Emphysema of lung (CMS/[facility])   • Cough with hemoptysis   • Coffee ground emesis   • Paroxysmal SVT (supraventricular tachycardia) (CMS/[facility])   • Chest pain with low risk for cardiac etiology   • Hypercholesterolemia   • Hypertension   • Chronic back pain   • PAD (peripheral artery disease) (CMS/[facility])         ICD-10-CM ICD-9-CM   1. PAD (peripheral artery disease) (CMS/[facility]) I73.9 443.9   2. Hypercholesterolemia E78.00 272.0   3. Essential hypertension I10 401.9   4. Tobacco abuse Z72.0 305.1   5. Bilateral carotid artery stenosis I65.23 433.10     433.30         PLAN: After thoroughly evaluating Sascha MUÑIZ Cecille Peterson., I am pleased to report he is doing well status post right lower extremity angiogram.  He reports his claudication to his right leg has resolved.  His groin has healed.  We will see him back in 6 months with repeat noninvasive testing for continued surveillance, including ABIs.  And a carotid duplex.  I did  extensively on smoking cessation, and the patient was advised of the continued risks of smoking.  I provided over  10 minutes counseling on this matter.  I did discuss vascular risk factors as they pertain to the progression of vascular disease including controlling his hypertension, hyperlipidemia, and smoking cessation.  Body mass index is 23.74 kg/m². The patient is to continue taking their medications as previously discussed.   This was all discussed in full with complete understanding.    Thank you for allowing me to participate in the care of your patient.  Please do not hesitate to call with any questions or concerns.  We will keep you aware of any further encounters with Sascha Oden Sr..      Sincerely Yours,        FORTINO Dubose

## 2019-05-01 DIAGNOSIS — I73.9 PAD (PERIPHERAL ARTERY DISEASE) (HCC): ICD-10-CM

## 2019-05-01 RX ORDER — CLOPIDOGREL BISULFATE 75 MG/1
75 TABLET ORAL DAILY
Qty: 30 TABLET | Refills: 0 | Status: SHIPPED | OUTPATIENT
Start: 2019-05-01 | End: 2019-06-03 | Stop reason: SDUPTHER

## 2019-06-03 DIAGNOSIS — I73.9 PAD (PERIPHERAL ARTERY DISEASE) (HCC): ICD-10-CM

## 2019-06-04 RX ORDER — CLOPIDOGREL BISULFATE 75 MG/1
75 TABLET ORAL DAILY
Qty: 30 TABLET | Refills: 3 | Status: SHIPPED | OUTPATIENT
Start: 2019-06-04 | End: 2020-01-08 | Stop reason: HOSPADM

## 2019-06-18 ENCOUNTER — TELEPHONE (OUTPATIENT)
Dept: PODIATRY | Facility: CLINIC | Age: 59
End: 2019-06-18

## 2019-06-18 NOTE — TELEPHONE ENCOUNTER
Pt called requesting refill on Plavix.  This was sent in on 6/03/19.  I called [facility] to verify they rec med.  Pharmacy stated this medication this med was on hold. I called pt back to let him know this and he can go p/u med. He voiced understanding DN

## 2019-08-22 ENCOUNTER — TELEPHONE (OUTPATIENT)
Dept: VASCULAR SURGERY | Facility: CLINIC | Age: 59
End: 2019-08-22

## 2019-08-22 NOTE — TELEPHONE ENCOUNTER
Left message letting Mr Oden know that we would need to reschedule his appointment as he would need testing done before hand as he missed his testing appointment on Wednesday, August 21st, 2019. I advised Mr Oden to please call our office at [phone] and we could get this all rescheduled for him.

## 2019-08-30 ENCOUNTER — APPOINTMENT (OUTPATIENT)
Dept: GENERAL RADIOLOGY | Facility: HOSPITAL | Age: 59
End: 2019-08-30

## 2019-08-30 ENCOUNTER — HOSPITAL ENCOUNTER (EMERGENCY)
Facility: HOSPITAL | Age: 59
Discharge: HOME | End: 2019-08-30
Admitting: FAMILY MEDICINE

## 2019-08-30 VITALS
HEART RATE: 77 BPM | BODY MASS INDEX: 27.4 KG/M2 | TEMPERATURE: 97.6 F | RESPIRATION RATE: 18 BRPM | OXYGEN SATURATION: 98 % | WEIGHT: 225 LBS | DIASTOLIC BLOOD PRESSURE: 73 MMHG | SYSTOLIC BLOOD PRESSURE: 110 MMHG | HEIGHT: 76 IN

## 2019-08-30 DIAGNOSIS — F10.10 ETOH ABUSE: Primary | ICD-10-CM

## 2019-08-30 DIAGNOSIS — R07.9 CHEST PAIN, UNSPECIFIED TYPE: ICD-10-CM

## 2019-08-30 LAB
ALBUMIN SERPL-MCNC: 3.7 G/DL (ref 3.5–5)
ALBUMIN/GLOB SERPL: 1.2 G/DL (ref 1.1–2.5)
ALP SERPL-CCNC: 57 U/L (ref 24–120)
ALT SERPL W P-5'-P-CCNC: 45 U/L (ref 0–54)
ANION GAP SERPL CALCULATED.3IONS-SCNC: 8 MMOL/L (ref 4–13)
APTT PPP: 28.1 SECONDS (ref 24.1–35)
AST SERPL-CCNC: 49 U/L (ref 7–45)
BASOPHILS # BLD AUTO: 0.09 10*3/MM3 (ref 0–0.2)
BASOPHILS NFR BLD AUTO: 0.9 % (ref 0–1.5)
BILIRUB SERPL-MCNC: 0.5 MG/DL (ref 0.1–1)
BILIRUB UR QL STRIP: NEGATIVE
BUN BLD-MCNC: 6 MG/DL (ref 5–21)
BUN/CREAT SERPL: 8.6 (ref 7–25)
CALCIUM SPEC-SCNC: 8.8 MG/DL (ref 8.4–10.4)
CHLORIDE SERPL-SCNC: 108 MMOL/L (ref 98–110)
CLARITY UR: CLEAR
CO2 SERPL-SCNC: 22 MMOL/L (ref 24–31)
COLOR UR: YELLOW
CREAT BLD-MCNC: 0.7 MG/DL (ref 0.5–1.4)
DEPRECATED RDW RBC AUTO: 49.1 FL (ref 37–54)
EOSINOPHIL # BLD AUTO: 0.48 10*3/MM3 (ref 0–0.4)
EOSINOPHIL NFR BLD AUTO: 4.8 % (ref 0.3–6.2)
ERYTHROCYTE [DISTWIDTH] IN BLOOD BY AUTOMATED COUNT: 13.9 % (ref 12.3–15.4)
ETHANOL UR QL: 0.23 %
ETHANOL UR QL: 0.28 %
GFR SERPL CREATININE-BSD FRML MDRD: 116 ML/MIN/1.73
GLOBULIN UR ELPH-MCNC: 3 GM/DL
GLUCOSE BLD-MCNC: 86 MG/DL (ref 70–100)
GLUCOSE UR STRIP-MCNC: NEGATIVE MG/DL
HCT VFR BLD AUTO: 40.7 % (ref 37.5–51)
HGB BLD-MCNC: 14.2 G/DL (ref 13–17.7)
HGB UR QL STRIP.AUTO: NEGATIVE
IMM GRANULOCYTES # BLD AUTO: 0.03 10*3/MM3 (ref 0–0.05)
IMM GRANULOCYTES NFR BLD AUTO: 0.3 % (ref 0–0.5)
INR PPP: 0.94 (ref 0.91–1.09)
KETONES UR QL STRIP: NEGATIVE
LEUKOCYTE ESTERASE UR QL STRIP.AUTO: NEGATIVE
LIPASE SERPL-CCNC: 96 U/L (ref 23–203)
LYMPHOCYTES # BLD AUTO: 3.13 10*3/MM3 (ref 0.7–3.1)
LYMPHOCYTES NFR BLD AUTO: 31.6 % (ref 19.6–45.3)
MCH RBC QN AUTO: 33.4 PG (ref 26.6–33)
MCHC RBC AUTO-ENTMCNC: 34.9 G/DL (ref 31.5–35.7)
MCV RBC AUTO: 95.8 FL (ref 79–97)
MONOCYTES # BLD AUTO: 1.18 10*3/MM3 (ref 0.1–0.9)
MONOCYTES NFR BLD AUTO: 11.9 % (ref 5–12)
NEUTROPHILS # BLD AUTO: 5.01 10*3/MM3 (ref 1.7–7)
NEUTROPHILS NFR BLD AUTO: 50.5 % (ref 42.7–76)
NITRITE UR QL STRIP: NEGATIVE
NRBC BLD AUTO-RTO: 0 /100 WBC (ref 0–0.2)
NT-PROBNP SERPL-MCNC: 210 PG/ML (ref 0–900)
PH UR STRIP.AUTO: <=5 [PH] (ref 5–8)
PLATELET # BLD AUTO: 206 10*3/MM3 (ref 140–450)
PMV BLD AUTO: 9.5 FL (ref 6–12)
POTASSIUM BLD-SCNC: 3.7 MMOL/L (ref 3.5–5.3)
PROT SERPL-MCNC: 6.7 G/DL (ref 6.3–8.7)
PROT UR QL STRIP: NEGATIVE
PROTHROMBIN TIME: 12.8 SECONDS (ref 11.9–14.6)
RBC # BLD AUTO: 4.25 10*6/MM3 (ref 4.14–5.8)
SODIUM BLD-SCNC: 138 MMOL/L (ref 135–145)
SP GR UR STRIP: <=1.005 (ref 1–1.03)
TROPONIN I SERPL-MCNC: 0.01 NG/ML (ref 0–0.03)
TROPONIN I SERPL-MCNC: 0.01 NG/ML (ref 0–0.03)
UROBILINOGEN UR QL STRIP: NORMAL
WBC NRBC COR # BLD: 9.92 10*3/MM3 (ref 3.4–10.8)

## 2019-08-30 PROCEDURE — 83880 ASSAY OF NATRIURETIC PEPTIDE: CPT | Performed by: PHYSICIAN ASSISTANT

## 2019-08-30 PROCEDURE — 80307 DRUG TEST PRSMV CHEM ANLYZR: CPT | Performed by: PHYSICIAN ASSISTANT

## 2019-08-30 PROCEDURE — 71045 X-RAY EXAM CHEST 1 VIEW: CPT

## 2019-08-30 PROCEDURE — 84484 ASSAY OF TROPONIN QUANT: CPT | Performed by: PHYSICIAN ASSISTANT

## 2019-08-30 PROCEDURE — 99284 EMERGENCY DEPT VISIT MOD MDM: CPT

## 2019-08-30 PROCEDURE — 83690 ASSAY OF LIPASE: CPT | Performed by: PHYSICIAN ASSISTANT

## 2019-08-30 PROCEDURE — 85025 COMPLETE CBC W/AUTO DIFF WBC: CPT | Performed by: PHYSICIAN ASSISTANT

## 2019-08-30 PROCEDURE — 93005 ELECTROCARDIOGRAM TRACING: CPT | Performed by: EMERGENCY MEDICINE

## 2019-08-30 PROCEDURE — 81003 URINALYSIS AUTO W/O SCOPE: CPT | Performed by: PHYSICIAN ASSISTANT

## 2019-08-30 PROCEDURE — 85730 THROMBOPLASTIN TIME PARTIAL: CPT | Performed by: PHYSICIAN ASSISTANT

## 2019-08-30 PROCEDURE — 93010 ELECTROCARDIOGRAM REPORT: CPT | Performed by: INTERNAL MEDICINE

## 2019-08-30 PROCEDURE — 93005 ELECTROCARDIOGRAM TRACING: CPT | Performed by: PHYSICIAN ASSISTANT

## 2019-08-30 PROCEDURE — 85610 PROTHROMBIN TIME: CPT | Performed by: PHYSICIAN ASSISTANT

## 2019-08-30 PROCEDURE — 80053 COMPREHEN METABOLIC PANEL: CPT | Performed by: PHYSICIAN ASSISTANT

## 2019-08-30 RX ADMIN — SODIUM CHLORIDE 1000 ML: 9 INJECTION, SOLUTION INTRAVENOUS at 17:27

## 2019-12-29 ENCOUNTER — APPOINTMENT (OUTPATIENT)
Dept: CT IMAGING | Facility: HOSPITAL | Age: 59
End: 2019-12-29

## 2019-12-29 ENCOUNTER — HOSPITAL ENCOUNTER (INPATIENT)
Facility: HOSPITAL | Age: 59
LOS: 10 days | Discharge: HOME HEALTH | End: 2020-01-08
Attending: INTERNAL MEDICINE | Admitting: INTERNAL MEDICINE

## 2019-12-29 DIAGNOSIS — R13.10 DYSPHAGIA, UNSPECIFIED TYPE: ICD-10-CM

## 2019-12-29 DIAGNOSIS — J96.01 ACUTE RESPIRATORY FAILURE WITH HYPOXIA (HCC): ICD-10-CM

## 2019-12-29 DIAGNOSIS — Z74.09 IMPAIRED FUNCTIONAL MOBILITY AND ACTIVITY TOLERANCE: ICD-10-CM

## 2019-12-29 DIAGNOSIS — J44.1 COPD WITH ACUTE EXACERBATION (HCC): ICD-10-CM

## 2019-12-29 DIAGNOSIS — Z78.9 IMPAIRED MOBILITY AND ADLS: ICD-10-CM

## 2019-12-29 DIAGNOSIS — J43.8 OTHER EMPHYSEMA (HCC): ICD-10-CM

## 2019-12-29 DIAGNOSIS — Z74.09 IMPAIRED MOBILITY AND ADLS: ICD-10-CM

## 2019-12-29 DIAGNOSIS — IMO0001 ALCOHOLISM /ALCOHOL ABUSE: Chronic | ICD-10-CM

## 2019-12-29 DIAGNOSIS — T79.6XXA TRAUMATIC RHABDOMYOLYSIS, INITIAL ENCOUNTER (HCC): Primary | ICD-10-CM

## 2019-12-29 DIAGNOSIS — E87.1 HYPONATREMIA: ICD-10-CM

## 2019-12-29 PROBLEM — M62.82 RHABDOMYOLYSIS: Status: ACTIVE | Noted: 2019-12-29

## 2019-12-29 LAB
ALBUMIN SERPL-MCNC: 3.9 G/DL (ref 3.5–5.2)
ALBUMIN/GLOB SERPL: 1.2 G/DL
ALP SERPL-CCNC: 57 U/L (ref 39–117)
ALT SERPL W P-5'-P-CCNC: 75 U/L (ref 1–41)
AMPHET+METHAMPHET UR QL: NEGATIVE
AMPHETAMINES UR QL: NEGATIVE
ANION GAP SERPL CALCULATED.3IONS-SCNC: 18 MMOL/L (ref 5–15)
AST SERPL-CCNC: 146 U/L (ref 1–40)
BARBITURATES UR QL SCN: NEGATIVE
BASOPHILS # BLD AUTO: 0.02 10*3/MM3 (ref 0–0.2)
BASOPHILS NFR BLD AUTO: 0.3 % (ref 0–1.5)
BENZODIAZ UR QL SCN: NEGATIVE
BILIRUB SERPL-MCNC: 0.3 MG/DL (ref 0.2–1.2)
BILIRUB UR QL STRIP: NEGATIVE
BUN BLD-MCNC: 11 MG/DL (ref 6–20)
BUN/CREAT SERPL: 15.7 (ref 7–25)
BUPRENORPHINE SERPL-MCNC: NEGATIVE NG/ML
CALCIUM SPEC-SCNC: 8.2 MG/DL (ref 8.6–10.5)
CANNABINOIDS SERPL QL: NEGATIVE
CHLORIDE SERPL-SCNC: 80 MMOL/L (ref 98–107)
CK SERPL-CCNC: 1335 U/L (ref 20–200)
CLARITY UR: CLEAR
CO2 SERPL-SCNC: 23 MMOL/L (ref 22–29)
COCAINE UR QL: NEGATIVE
COLOR UR: ABNORMAL
CREAT BLD-MCNC: 0.7 MG/DL (ref 0.76–1.27)
DEPRECATED RDW RBC AUTO: 39.8 FL (ref 37–54)
EOSINOPHIL # BLD AUTO: 0 10*3/MM3 (ref 0–0.4)
EOSINOPHIL NFR BLD AUTO: 0 % (ref 0.3–6.2)
ERYTHROCYTE [DISTWIDTH] IN BLOOD BY AUTOMATED COUNT: 12.1 % (ref 12.3–15.4)
ETHANOL UR QL: 0.08 %
GFR SERPL CREATININE-BSD FRML MDRD: 116 ML/MIN/1.73
GLOBULIN UR ELPH-MCNC: 3.3 GM/DL
GLUCOSE BLD-MCNC: 88 MG/DL (ref 65–99)
GLUCOSE UR STRIP-MCNC: NEGATIVE MG/DL
HCT VFR BLD AUTO: 44.6 % (ref 37.5–51)
HGB BLD-MCNC: 16.1 G/DL (ref 13–17.7)
HGB UR QL STRIP.AUTO: NEGATIVE
HOLD SPECIMEN: NORMAL
HOLD SPECIMEN: NORMAL
IMM GRANULOCYTES # BLD AUTO: 0.04 10*3/MM3 (ref 0–0.05)
IMM GRANULOCYTES NFR BLD AUTO: 0.6 % (ref 0–0.5)
KETONES UR QL STRIP: ABNORMAL
LEUKOCYTE ESTERASE UR QL STRIP.AUTO: NEGATIVE
LYMPHOCYTES # BLD AUTO: 0.71 10*3/MM3 (ref 0.7–3.1)
LYMPHOCYTES NFR BLD AUTO: 11.3 % (ref 19.6–45.3)
MCH RBC QN AUTO: 32.2 PG (ref 26.6–33)
MCHC RBC AUTO-ENTMCNC: 36.1 G/DL (ref 31.5–35.7)
MCV RBC AUTO: 89.2 FL (ref 79–97)
METHADONE UR QL SCN: NEGATIVE
MONOCYTES # BLD AUTO: 0.58 10*3/MM3 (ref 0.1–0.9)
MONOCYTES NFR BLD AUTO: 9.2 % (ref 5–12)
NEUTROPHILS # BLD AUTO: 4.95 10*3/MM3 (ref 1.7–7)
NEUTROPHILS NFR BLD AUTO: 78.6 % (ref 42.7–76)
NITRITE UR QL STRIP: NEGATIVE
NRBC BLD AUTO-RTO: 0 /100 WBC (ref 0–0.2)
OPIATES UR QL: NEGATIVE
OXYCODONE UR QL SCN: NEGATIVE
PCP UR QL SCN: NEGATIVE
PH UR STRIP.AUTO: <=5 [PH] (ref 5–8)
PLATELET # BLD AUTO: 97 10*3/MM3 (ref 140–450)
PMV BLD AUTO: 10.1 FL (ref 6–12)
POTASSIUM BLD-SCNC: 3.7 MMOL/L (ref 3.5–5.2)
PROPOXYPH UR QL: NEGATIVE
PROT SERPL-MCNC: 7.2 G/DL (ref 6–8.5)
PROT UR QL STRIP: ABNORMAL
RBC # BLD AUTO: 5 10*6/MM3 (ref 4.14–5.8)
RBC MORPH BLD: NORMAL
SMALL PLATELETS BLD QL SMEAR: NORMAL
SODIUM BLD-SCNC: 121 MMOL/L (ref 136–145)
SP GR UR STRIP: 1.03 (ref 1–1.03)
TRICYCLICS UR QL SCN: NEGATIVE
UROBILINOGEN UR QL STRIP: ABNORMAL
WBC MORPH BLD: NORMAL
WBC NRBC COR # BLD: 6.3 10*3/MM3 (ref 3.4–10.8)
WHOLE BLOOD HOLD SPECIMEN: NORMAL
WHOLE BLOOD HOLD SPECIMEN: NORMAL

## 2019-12-29 PROCEDURE — 70450 CT HEAD/BRAIN W/O DYE: CPT

## 2019-12-29 PROCEDURE — 81003 URINALYSIS AUTO W/O SCOPE: CPT | Performed by: INTERNAL MEDICINE

## 2019-12-29 PROCEDURE — 85025 COMPLETE CBC W/AUTO DIFF WBC: CPT | Performed by: INTERNAL MEDICINE

## 2019-12-29 PROCEDURE — 93005 ELECTROCARDIOGRAM TRACING: CPT | Performed by: INTERNAL MEDICINE

## 2019-12-29 PROCEDURE — 85007 BL SMEAR W/DIFF WBC COUNT: CPT | Performed by: INTERNAL MEDICINE

## 2019-12-29 PROCEDURE — 80307 DRUG TEST PRSMV CHEM ANLYZR: CPT | Performed by: INTERNAL MEDICINE

## 2019-12-29 PROCEDURE — 93010 ELECTROCARDIOGRAM REPORT: CPT | Performed by: INTERNAL MEDICINE

## 2019-12-29 PROCEDURE — 80053 COMPREHEN METABOLIC PANEL: CPT | Performed by: INTERNAL MEDICINE

## 2019-12-29 PROCEDURE — 25010000002 MORPHINE PER 10 MG: Performed by: INTERNAL MEDICINE

## 2019-12-29 PROCEDURE — 25010000002 ONDANSETRON PER 1 MG: Performed by: INTERNAL MEDICINE

## 2019-12-29 PROCEDURE — 99284 EMERGENCY DEPT VISIT MOD MDM: CPT

## 2019-12-29 PROCEDURE — 82550 ASSAY OF CK (CPK): CPT | Performed by: INTERNAL MEDICINE

## 2019-12-29 RX ORDER — ONDANSETRON 2 MG/ML
4 INJECTION INTRAMUSCULAR; INTRAVENOUS ONCE
Status: COMPLETED | OUTPATIENT
Start: 2019-12-29 | End: 2019-12-29

## 2019-12-29 RX ADMIN — MORPHINE SULFATE 4 MG: 4 INJECTION, SOLUTION INTRAMUSCULAR; INTRAVENOUS at 20:18

## 2019-12-29 RX ADMIN — ONDANSETRON HYDROCHLORIDE 4 MG: 2 SOLUTION INTRAMUSCULAR; INTRAVENOUS at 20:19

## 2019-12-29 RX ADMIN — SODIUM CHLORIDE 1000 ML: 9 INJECTION, SOLUTION INTRAVENOUS at 19:59

## 2019-12-30 ENCOUNTER — APPOINTMENT (OUTPATIENT)
Dept: CARDIOLOGY | Facility: HOSPITAL | Age: 59
End: 2019-12-30

## 2019-12-30 ENCOUNTER — APPOINTMENT (OUTPATIENT)
Dept: GENERAL RADIOLOGY | Facility: HOSPITAL | Age: 59
End: 2019-12-30

## 2019-12-30 ENCOUNTER — APPOINTMENT (OUTPATIENT)
Dept: CT IMAGING | Facility: HOSPITAL | Age: 59
End: 2019-12-30

## 2019-12-30 PROBLEM — J44.1 COPD WITH ACUTE EXACERBATION (HCC): Status: ACTIVE | Noted: 2019-12-30

## 2019-12-30 PROBLEM — IMO0001 ALCOHOLISM /ALCOHOL ABUSE: Chronic | Status: ACTIVE | Noted: 2019-12-30

## 2019-12-30 PROBLEM — D69.6 THROMBOCYTOPENIA (HCC): Status: ACTIVE | Noted: 2019-12-30

## 2019-12-30 PROBLEM — E87.1 HYPONATREMIA: Status: ACTIVE | Noted: 2019-12-30

## 2019-12-30 PROBLEM — J18.9 CAP (COMMUNITY ACQUIRED PNEUMONIA): Status: ACTIVE | Noted: 2019-12-30

## 2019-12-30 PROBLEM — R55 SYNCOPE AND COLLAPSE: Status: ACTIVE | Noted: 2019-12-30

## 2019-12-30 PROBLEM — R05.9 COUGH: Status: ACTIVE | Noted: 2019-12-30

## 2019-12-30 PROBLEM — R19.7 DIARRHEA: Status: ACTIVE | Noted: 2019-12-30

## 2019-12-30 PROBLEM — J18.9 SEPSIS DUE TO PNEUMONIA (HCC): Status: ACTIVE | Noted: 2019-12-30

## 2019-12-30 PROBLEM — A41.9 SEPSIS DUE TO PNEUMONIA (HCC): Status: ACTIVE | Noted: 2019-12-30

## 2019-12-30 LAB
ALBUMIN SERPL-MCNC: 3.4 G/DL (ref 3.5–5.2)
ALBUMIN/GLOB SERPL: 1.2 G/DL
ALP SERPL-CCNC: 50 U/L (ref 39–117)
ALT SERPL W P-5'-P-CCNC: 66 U/L (ref 1–41)
AMYLASE SERPL-CCNC: 85 U/L (ref 28–100)
ANION GAP SERPL CALCULATED.3IONS-SCNC: 11 MMOL/L (ref 5–15)
ANION GAP SERPL CALCULATED.3IONS-SCNC: 12 MMOL/L (ref 5–15)
ANION GAP SERPL CALCULATED.3IONS-SCNC: 14 MMOL/L (ref 5–15)
ANION GAP SERPL CALCULATED.3IONS-SCNC: 9 MMOL/L (ref 5–15)
ARTERIAL PATENCY WRIST A: POSITIVE
AST SERPL-CCNC: 132 U/L (ref 1–40)
ATMOSPHERIC PRESS: 744 MMHG
BASE EXCESS BLDA CALC-SCNC: 1.5 MMOL/L (ref 0–2)
BASOPHILS # BLD AUTO: 0.01 10*3/MM3 (ref 0–0.2)
BASOPHILS NFR BLD AUTO: 0.2 % (ref 0–1.5)
BDY SITE: ABNORMAL
BH CV ECHO MEAS - AO MAX PG (FULL): 4 MMHG
BH CV ECHO MEAS - AO MAX PG: 7.5 MMHG
BH CV ECHO MEAS - AO MEAN PG (FULL): 1 MMHG
BH CV ECHO MEAS - AO MEAN PG: 3 MMHG
BH CV ECHO MEAS - AO ROOT AREA (BSA CORRECTED): 1.8
BH CV ECHO MEAS - AO ROOT AREA: 11.9 CM^2
BH CV ECHO MEAS - AO ROOT DIAM: 3.9 CM
BH CV ECHO MEAS - AO V2 MAX: 137 CM/SEC
BH CV ECHO MEAS - AO V2 MEAN: 81 CM/SEC
BH CV ECHO MEAS - AO V2 VTI: 19.1 CM
BH CV ECHO MEAS - AVA(I,A): 3.2 CM^2
BH CV ECHO MEAS - AVA(I,D): 3.2 CM^2
BH CV ECHO MEAS - AVA(V,A): 2.8 CM^2
BH CV ECHO MEAS - AVA(V,D): 2.8 CM^2
BH CV ECHO MEAS - BSA(HAYCOCK): 2.1 M^2
BH CV ECHO MEAS - BSA: 2.1 M^2
BH CV ECHO MEAS - BZI_BMI: 22.2 KILOGRAMS/M^2
BH CV ECHO MEAS - BZI_METRIC_HEIGHT: 193 CM
BH CV ECHO MEAS - BZI_METRIC_WEIGHT: 82.6 KG
BH CV ECHO MEAS - EDV(MOD-SP4): 149 ML
BH CV ECHO MEAS - EF(MOD-SP4): 56.7 %
BH CV ECHO MEAS - ESV(MOD-SP4): 64.5 ML
BH CV ECHO MEAS - LAT PEAK E' VEL: 10.9 CM/SEC
BH CV ECHO MEAS - LV DIASTOLIC VOL/BSA (35-75): 70 ML/M^2
BH CV ECHO MEAS - LV MAX PG: 3.5 MMHG
BH CV ECHO MEAS - LV MEAN PG: 2 MMHG
BH CV ECHO MEAS - LV SYSTOLIC VOL/BSA (12-30): 30.3 ML/M^2
BH CV ECHO MEAS - LV V1 MAX: 93.1 CM/SEC
BH CV ECHO MEAS - LV V1 MEAN: 63.3 CM/SEC
BH CV ECHO MEAS - LV V1 VTI: 14.9 CM
BH CV ECHO MEAS - LVLD AP4: 9 CM
BH CV ECHO MEAS - LVLS AP4: 8 CM
BH CV ECHO MEAS - LVOT AREA (M): 4.2 CM^2
BH CV ECHO MEAS - LVOT AREA: 4.2 CM^2
BH CV ECHO MEAS - LVOT DIAM: 2.3 CM
BH CV ECHO MEAS - MED PEAK E' VEL: 8.16 CM/SEC
BH CV ECHO MEAS - MV A MAX VEL: 78.1 CM/SEC
BH CV ECHO MEAS - MV DEC SLOPE: 142 CM/SEC^2
BH CV ECHO MEAS - MV DEC TIME: 0.35 SEC
BH CV ECHO MEAS - MV E MAX VEL: 49.5 CM/SEC
BH CV ECHO MEAS - MV E/A: 0.63
BH CV ECHO MEAS - SI(AO): 107.2 ML/M^2
BH CV ECHO MEAS - SI(LVOT): 29.1 ML/M^2
BH CV ECHO MEAS - SI(MOD-SP4): 39.7 ML/M^2
BH CV ECHO MEAS - SV(AO): 228.2 ML
BH CV ECHO MEAS - SV(LVOT): 61.9 ML
BH CV ECHO MEAS - SV(MOD-SP4): 84.5 ML
BH CV ECHO MEASUREMENTS AVERAGE E/E' RATIO: 5.19
BILIRUB SERPL-MCNC: 0.3 MG/DL (ref 0.2–1.2)
BODY TEMPERATURE: 37 C
BUN BLD-MCNC: 11 MG/DL (ref 6–20)
BUN BLD-MCNC: 9 MG/DL (ref 6–20)
BUN/CREAT SERPL: 12.2 (ref 7–25)
BUN/CREAT SERPL: 19 (ref 7–25)
BUN/CREAT SERPL: 19.3 (ref 7–25)
BUN/CREAT SERPL: 20.8 (ref 7–25)
CALCIUM SPEC-SCNC: 7.7 MG/DL (ref 8.6–10.5)
CALCIUM SPEC-SCNC: 7.9 MG/DL (ref 8.6–10.5)
CALCIUM SPEC-SCNC: 7.9 MG/DL (ref 8.6–10.5)
CALCIUM SPEC-SCNC: 8.1 MG/DL (ref 8.6–10.5)
CHLORIDE SERPL-SCNC: 83 MMOL/L (ref 98–107)
CHLORIDE SERPL-SCNC: 85 MMOL/L (ref 98–107)
CHLORIDE SERPL-SCNC: 88 MMOL/L (ref 98–107)
CHLORIDE SERPL-SCNC: 90 MMOL/L (ref 98–107)
CK SERPL-CCNC: 1281 U/L (ref 20–200)
CO2 SERPL-SCNC: 23 MMOL/L (ref 22–29)
CO2 SERPL-SCNC: 25 MMOL/L (ref 22–29)
CO2 SERPL-SCNC: 26 MMOL/L (ref 22–29)
CO2 SERPL-SCNC: 29 MMOL/L (ref 22–29)
CREAT BLD-MCNC: 0.53 MG/DL (ref 0.76–1.27)
CREAT BLD-MCNC: 0.57 MG/DL (ref 0.76–1.27)
CREAT BLD-MCNC: 0.58 MG/DL (ref 0.76–1.27)
CREAT BLD-MCNC: 0.74 MG/DL (ref 0.76–1.27)
CREAT UR-MCNC: 46.8 MG/DL
D-LACTATE SERPL-SCNC: 1.1 MMOL/L (ref 0.5–2)
DEPRECATED RDW RBC AUTO: 38.7 FL (ref 37–54)
EOSINOPHIL # BLD AUTO: 0 10*3/MM3 (ref 0–0.4)
EOSINOPHIL NFR BLD AUTO: 0 % (ref 0.3–6.2)
ERYTHROCYTE [DISTWIDTH] IN BLOOD BY AUTOMATED COUNT: 11.9 % (ref 12.3–15.4)
FLUAV AG NPH QL: POSITIVE
FLUBV AG NPH QL IA: NEGATIVE
GAS FLOW AIRWAY: 3 LPM
GFR SERPL CREATININE-BSD FRML MDRD: 109 ML/MIN/1.73
GFR SERPL CREATININE-BSD FRML MDRD: 144 ML/MIN/1.73
GFR SERPL CREATININE-BSD FRML MDRD: 147 ML/MIN/1.73
GFR SERPL CREATININE-BSD FRML MDRD: >150 ML/MIN/1.73
GLOBULIN UR ELPH-MCNC: 2.8 GM/DL
GLUCOSE BLD-MCNC: 111 MG/DL (ref 65–99)
GLUCOSE BLD-MCNC: 122 MG/DL (ref 65–99)
GLUCOSE BLD-MCNC: 127 MG/DL (ref 65–99)
GLUCOSE BLD-MCNC: 137 MG/DL (ref 65–99)
GLUCOSE BLDC GLUCOMTR-MCNC: 121 MG/DL (ref 70–130)
HCO3 BLDA-SCNC: 24 MMOL/L (ref 20–26)
HCT VFR BLD AUTO: 41.7 % (ref 37.5–51)
HGB BLD-MCNC: 15.3 G/DL (ref 13–17.7)
IMM GRANULOCYTES # BLD AUTO: 0.01 10*3/MM3 (ref 0–0.05)
IMM GRANULOCYTES NFR BLD AUTO: 0.2 % (ref 0–0.5)
L PNEUMO1 AG UR QL IA: NEGATIVE
LEFT ATRIUM VOLUME INDEX: 38.1 ML/M2
LEFT ATRIUM VOLUME: 81.2 CM3
LIPASE SERPL-CCNC: 62 U/L (ref 13–60)
LYMPHOCYTES # BLD AUTO: 0.5 10*3/MM3 (ref 0.7–3.1)
LYMPHOCYTES NFR BLD AUTO: 10.2 % (ref 19.6–45.3)
Lab: ABNORMAL
Lab: ABNORMAL
MAGNESIUM SERPL-MCNC: 1.9 MG/DL (ref 1.6–2.6)
MAXIMAL PREDICTED HEART RATE: 163 BPM
MCH RBC QN AUTO: 32.1 PG (ref 26.6–33)
MCHC RBC AUTO-ENTMCNC: 36.7 G/DL (ref 31.5–35.7)
MCV RBC AUTO: 87.4 FL (ref 79–97)
MODALITY: ABNORMAL
MONOCYTES # BLD AUTO: 0.48 10*3/MM3 (ref 0.1–0.9)
MONOCYTES NFR BLD AUTO: 9.8 % (ref 5–12)
NEUTROPHILS # BLD AUTO: 3.9 10*3/MM3 (ref 1.7–7)
NEUTROPHILS NFR BLD AUTO: 79.6 % (ref 42.7–76)
NOTIFIED BY: ABNORMAL
NOTIFIED WHO: ABNORMAL
NRBC BLD AUTO-RTO: 0 /100 WBC (ref 0–0.2)
OSMOLALITY SERPL: 249 MOSM/KG (ref 289–308)
OSMOLALITY UR: 307 MOSM/KG (ref 601–850)
PCO2 BLDA: 31.5 MM HG (ref 35–45)
PH BLDA: 7.49 PH UNITS (ref 7.35–7.45)
PHOSPHATE SERPL-MCNC: 2.2 MG/DL (ref 2.5–4.5)
PLATELET # BLD AUTO: 87 10*3/MM3 (ref 140–450)
PMV BLD AUTO: 9.9 FL (ref 6–12)
PO2 BLDA: 51.5 MM HG (ref 83–108)
POTASSIUM BLD-SCNC: 3.5 MMOL/L (ref 3.5–5.2)
POTASSIUM BLD-SCNC: 3.5 MMOL/L (ref 3.5–5.2)
POTASSIUM BLD-SCNC: 3.8 MMOL/L (ref 3.5–5.2)
POTASSIUM BLD-SCNC: 3.9 MMOL/L (ref 3.5–5.2)
PROCALCITONIN SERPL-MCNC: 0.34 NG/ML (ref 0.1–0.25)
PROT SERPL-MCNC: 6.2 G/DL (ref 6–8.5)
RBC # BLD AUTO: 4.77 10*6/MM3 (ref 4.14–5.8)
S PNEUM AG SPEC QL LA: NEGATIVE
SAO2 % BLDCOA: 89.9 % (ref 94–99)
SODIUM BLD-SCNC: 120 MMOL/L (ref 136–145)
SODIUM BLD-SCNC: 122 MMOL/L (ref 136–145)
SODIUM BLD-SCNC: 125 MMOL/L (ref 136–145)
SODIUM BLD-SCNC: 128 MMOL/L (ref 136–145)
SODIUM UR-SCNC: <20 MMOL/L
STRESS TARGET HR: 139 BPM
TROPONIN T SERPL-MCNC: <0.01 NG/ML (ref 0–0.03)
VENTILATOR MODE: ABNORMAL
WBC NRBC COR # BLD: 4.9 10*3/MM3 (ref 3.4–10.8)

## 2019-12-30 PROCEDURE — 94640 AIRWAY INHALATION TREATMENT: CPT

## 2019-12-30 PROCEDURE — 97165 OT EVAL LOW COMPLEX 30 MIN: CPT | Performed by: OCCUPATIONAL THERAPIST

## 2019-12-30 PROCEDURE — 87040 BLOOD CULTURE FOR BACTERIA: CPT | Performed by: NURSE PRACTITIONER

## 2019-12-30 PROCEDURE — 93306 TTE W/DOPPLER COMPLETE: CPT | Performed by: INTERNAL MEDICINE

## 2019-12-30 PROCEDURE — 84484 ASSAY OF TROPONIN QUANT: CPT | Performed by: INTERNAL MEDICINE

## 2019-12-30 PROCEDURE — 74176 CT ABD & PELVIS W/O CONTRAST: CPT

## 2019-12-30 PROCEDURE — 87899 AGENT NOS ASSAY W/OPTIC: CPT | Performed by: NURSE PRACTITIONER

## 2019-12-30 PROCEDURE — 84145 PROCALCITONIN (PCT): CPT | Performed by: INTERNAL MEDICINE

## 2019-12-30 PROCEDURE — 93306 TTE W/DOPPLER COMPLETE: CPT

## 2019-12-30 PROCEDURE — 92610 EVALUATE SWALLOWING FUNCTION: CPT | Performed by: SPEECH-LANGUAGE PATHOLOGIST

## 2019-12-30 PROCEDURE — 82150 ASSAY OF AMYLASE: CPT | Performed by: INTERNAL MEDICINE

## 2019-12-30 PROCEDURE — 94760 N-INVAS EAR/PLS OXIMETRY 1: CPT

## 2019-12-30 PROCEDURE — 83874 ASSAY OF MYOGLOBIN: CPT | Performed by: NURSE PRACTITIONER

## 2019-12-30 PROCEDURE — 84300 ASSAY OF URINE SODIUM: CPT | Performed by: INTERNAL MEDICINE

## 2019-12-30 PROCEDURE — 84100 ASSAY OF PHOSPHORUS: CPT | Performed by: INTERNAL MEDICINE

## 2019-12-30 PROCEDURE — 82570 ASSAY OF URINE CREATININE: CPT | Performed by: INTERNAL MEDICINE

## 2019-12-30 PROCEDURE — 25010000002 METHYLPREDNISOLONE PER 125 MG: Performed by: INTERNAL MEDICINE

## 2019-12-30 PROCEDURE — 25010000002 PERFLUTREN 6.52 MG/ML SUSPENSION: Performed by: INTERNAL MEDICINE

## 2019-12-30 PROCEDURE — 83605 ASSAY OF LACTIC ACID: CPT | Performed by: INTERNAL MEDICINE

## 2019-12-30 PROCEDURE — 82962 GLUCOSE BLOOD TEST: CPT

## 2019-12-30 PROCEDURE — 83930 ASSAY OF BLOOD OSMOLALITY: CPT | Performed by: INTERNAL MEDICINE

## 2019-12-30 PROCEDURE — 85025 COMPLETE CBC W/AUTO DIFF WBC: CPT | Performed by: INTERNAL MEDICINE

## 2019-12-30 PROCEDURE — 25010000002 ENOXAPARIN PER 10 MG: Performed by: INTERNAL MEDICINE

## 2019-12-30 PROCEDURE — 97161 PT EVAL LOW COMPLEX 20 MIN: CPT | Performed by: PHYSICAL THERAPIST

## 2019-12-30 PROCEDURE — 83935 ASSAY OF URINE OSMOLALITY: CPT | Performed by: INTERNAL MEDICINE

## 2019-12-30 PROCEDURE — 82550 ASSAY OF CK (CPK): CPT | Performed by: INTERNAL MEDICINE

## 2019-12-30 PROCEDURE — 94799 UNLISTED PULMONARY SVC/PX: CPT

## 2019-12-30 PROCEDURE — 36600 WITHDRAWAL OF ARTERIAL BLOOD: CPT

## 2019-12-30 PROCEDURE — 87804 INFLUENZA ASSAY W/OPTIC: CPT | Performed by: INTERNAL MEDICINE

## 2019-12-30 PROCEDURE — 80053 COMPREHEN METABOLIC PANEL: CPT | Performed by: INTERNAL MEDICINE

## 2019-12-30 PROCEDURE — 87070 CULTURE OTHR SPECIMN AEROBIC: CPT | Performed by: INTERNAL MEDICINE

## 2019-12-30 PROCEDURE — 25010000002 LORAZEPAM PER 2 MG: Performed by: INTERNAL MEDICINE

## 2019-12-30 PROCEDURE — 87205 SMEAR GRAM STAIN: CPT | Performed by: INTERNAL MEDICINE

## 2019-12-30 PROCEDURE — 82803 BLOOD GASES ANY COMBINATION: CPT

## 2019-12-30 PROCEDURE — 83690 ASSAY OF LIPASE: CPT | Performed by: INTERNAL MEDICINE

## 2019-12-30 PROCEDURE — 71045 X-RAY EXAM CHEST 1 VIEW: CPT

## 2019-12-30 PROCEDURE — 83735 ASSAY OF MAGNESIUM: CPT | Performed by: INTERNAL MEDICINE

## 2019-12-30 PROCEDURE — 25010000002 LEVOFLOXACIN PER 250 MG: Performed by: INTERNAL MEDICINE

## 2019-12-30 RX ORDER — IPRATROPIUM BROMIDE AND ALBUTEROL SULFATE 2.5; .5 MG/3ML; MG/3ML
3 SOLUTION RESPIRATORY (INHALATION) EVERY 4 HOURS PRN
Status: DISCONTINUED | OUTPATIENT
Start: 2019-12-30 | End: 2020-01-08 | Stop reason: HOSPADM

## 2019-12-30 RX ORDER — BENZONATATE 100 MG/1
200 CAPSULE ORAL 3 TIMES DAILY PRN
Status: DISCONTINUED | OUTPATIENT
Start: 2019-12-30 | End: 2020-01-08 | Stop reason: HOSPADM

## 2019-12-30 RX ORDER — LORAZEPAM 2 MG/ML
2 INJECTION INTRAMUSCULAR
Status: DISCONTINUED | OUTPATIENT
Start: 2019-12-30 | End: 2020-01-05

## 2019-12-30 RX ORDER — SODIUM CHLORIDE 0.9 % (FLUSH) 0.9 %
10 SYRINGE (ML) INJECTION AS NEEDED
Status: DISCONTINUED | OUTPATIENT
Start: 2019-12-30 | End: 2020-01-08 | Stop reason: HOSPADM

## 2019-12-30 RX ORDER — LORAZEPAM 2 MG/ML
1 INJECTION INTRAMUSCULAR EVERY 8 HOURS
Status: DISCONTINUED | OUTPATIENT
Start: 2019-12-31 | End: 2019-12-30

## 2019-12-30 RX ORDER — LORAZEPAM 1 MG/1
4 TABLET ORAL
Status: DISCONTINUED | OUTPATIENT
Start: 2019-12-30 | End: 2020-01-05

## 2019-12-30 RX ORDER — DIAZEPAM 2 MG/1
2 TABLET ORAL EVERY 8 HOURS SCHEDULED
Status: DISCONTINUED | OUTPATIENT
Start: 2019-12-30 | End: 2020-01-07

## 2019-12-30 RX ORDER — ONDANSETRON 2 MG/ML
4 INJECTION INTRAMUSCULAR; INTRAVENOUS EVERY 6 HOURS PRN
Status: DISCONTINUED | OUTPATIENT
Start: 2019-12-30 | End: 2020-01-08 | Stop reason: HOSPADM

## 2019-12-30 RX ORDER — HYDROCODONE BITARTRATE AND ACETAMINOPHEN 10; 325 MG/1; MG/1
1 TABLET ORAL EVERY 6 HOURS PRN
Status: DISCONTINUED | OUTPATIENT
Start: 2019-12-30 | End: 2020-01-08 | Stop reason: HOSPADM

## 2019-12-30 RX ORDER — SODIUM CHLORIDE 0.9 % (FLUSH) 0.9 %
10 SYRINGE (ML) INJECTION EVERY 12 HOURS SCHEDULED
Status: DISCONTINUED | OUTPATIENT
Start: 2019-12-30 | End: 2020-01-08 | Stop reason: HOSPADM

## 2019-12-30 RX ORDER — SACCHAROMYCES BOULARDII 250 MG
250 CAPSULE ORAL 2 TIMES DAILY
Status: DISCONTINUED | OUTPATIENT
Start: 2019-12-30 | End: 2020-01-08 | Stop reason: HOSPADM

## 2019-12-30 RX ORDER — CLOPIDOGREL BISULFATE 75 MG/1
75 TABLET ORAL DAILY
Status: DISCONTINUED | OUTPATIENT
Start: 2019-12-30 | End: 2020-01-07

## 2019-12-30 RX ORDER — MULTIVIT,CALC,MINS/IRON/FOLIC 9MG-400MCG
1 TABLET ORAL DAILY
Status: DISCONTINUED | OUTPATIENT
Start: 2019-12-30 | End: 2020-01-08 | Stop reason: HOSPADM

## 2019-12-30 RX ORDER — LORAZEPAM 2 MG/ML
4 INJECTION INTRAMUSCULAR
Status: DISCONTINUED | OUTPATIENT
Start: 2019-12-30 | End: 2020-01-05

## 2019-12-30 RX ORDER — LORAZEPAM 2 MG/ML
1 INJECTION INTRAMUSCULAR EVERY 6 HOURS
Status: DISCONTINUED | OUTPATIENT
Start: 2019-12-30 | End: 2019-12-30

## 2019-12-30 RX ORDER — PREDNISONE 20 MG/1
40 TABLET ORAL DAILY
Status: DISCONTINUED | OUTPATIENT
Start: 2019-12-31 | End: 2020-01-02

## 2019-12-30 RX ORDER — GUAIFENESIN 600 MG/1
1200 TABLET, EXTENDED RELEASE ORAL EVERY 12 HOURS SCHEDULED
Status: DISCONTINUED | OUTPATIENT
Start: 2019-12-30 | End: 2020-01-08 | Stop reason: HOSPADM

## 2019-12-30 RX ORDER — ASPIRIN 325 MG
325 TABLET ORAL DAILY
Status: DISCONTINUED | OUTPATIENT
Start: 2019-12-30 | End: 2019-12-30

## 2019-12-30 RX ORDER — METHOCARBAMOL 500 MG/1
500 TABLET, FILM COATED ORAL EVERY 8 HOURS SCHEDULED
Status: DISCONTINUED | OUTPATIENT
Start: 2019-12-30 | End: 2020-01-08 | Stop reason: HOSPADM

## 2019-12-30 RX ORDER — NITROGLYCERIN 0.4 MG/1
0.4 TABLET SUBLINGUAL
Status: DISCONTINUED | OUTPATIENT
Start: 2019-12-30 | End: 2020-01-08 | Stop reason: HOSPADM

## 2019-12-30 RX ORDER — ASPIRIN 300 MG/1
300 SUPPOSITORY RECTAL DAILY
Status: DISCONTINUED | OUTPATIENT
Start: 2019-12-30 | End: 2019-12-30

## 2019-12-30 RX ORDER — ACETAMINOPHEN 325 MG/1
650 TABLET ORAL EVERY 4 HOURS PRN
Status: DISCONTINUED | OUTPATIENT
Start: 2019-12-30 | End: 2020-01-08 | Stop reason: HOSPADM

## 2019-12-30 RX ORDER — THIAMINE MONONITRATE (VIT B1) 100 MG
100 TABLET ORAL ONCE
Status: COMPLETED | OUTPATIENT
Start: 2019-12-30 | End: 2019-12-30

## 2019-12-30 RX ORDER — IPRATROPIUM BROMIDE AND ALBUTEROL SULFATE 2.5; .5 MG/3ML; MG/3ML
3 SOLUTION RESPIRATORY (INHALATION)
Status: DISCONTINUED | OUTPATIENT
Start: 2019-12-30 | End: 2020-01-02

## 2019-12-30 RX ORDER — METHYLPREDNISOLONE SODIUM SUCCINATE 125 MG/2ML
60 INJECTION, POWDER, LYOPHILIZED, FOR SOLUTION INTRAMUSCULAR; INTRAVENOUS EVERY 6 HOURS
Status: DISCONTINUED | OUTPATIENT
Start: 2019-12-30 | End: 2019-12-30

## 2019-12-30 RX ORDER — LORAZEPAM 2 MG/ML
1 INJECTION INTRAMUSCULAR
Status: DISCONTINUED | OUTPATIENT
Start: 2019-12-30 | End: 2020-01-05

## 2019-12-30 RX ORDER — LORAZEPAM 1 MG/1
2 TABLET ORAL
Status: DISCONTINUED | OUTPATIENT
Start: 2019-12-30 | End: 2020-01-05

## 2019-12-30 RX ORDER — LEVOFLOXACIN 5 MG/ML
750 INJECTION, SOLUTION INTRAVENOUS DAILY
Status: DISCONTINUED | OUTPATIENT
Start: 2019-12-30 | End: 2020-01-01 | Stop reason: CLARIF

## 2019-12-30 RX ORDER — ACETAMINOPHEN 650 MG/1
650 SUPPOSITORY RECTAL EVERY 4 HOURS PRN
Status: DISCONTINUED | OUTPATIENT
Start: 2019-12-30 | End: 2020-01-08 | Stop reason: HOSPADM

## 2019-12-30 RX ORDER — THIAMINE MONONITRATE (VIT B1) 100 MG
100 TABLET ORAL DAILY
Status: DISCONTINUED | OUTPATIENT
Start: 2019-12-31 | End: 2020-01-08 | Stop reason: HOSPADM

## 2019-12-30 RX ORDER — OSELTAMIVIR PHOSPHATE 75 MG/1
75 CAPSULE ORAL EVERY 12 HOURS SCHEDULED
Status: COMPLETED | OUTPATIENT
Start: 2019-12-30 | End: 2020-01-03

## 2019-12-30 RX ORDER — LORAZEPAM 1 MG/1
1 TABLET ORAL
Status: DISCONTINUED | OUTPATIENT
Start: 2019-12-30 | End: 2020-01-05

## 2019-12-30 RX ORDER — ONDANSETRON 4 MG/1
4 TABLET, FILM COATED ORAL EVERY 6 HOURS PRN
Status: DISCONTINUED | OUTPATIENT
Start: 2019-12-30 | End: 2020-01-08 | Stop reason: HOSPADM

## 2019-12-30 RX ORDER — ATORVASTATIN CALCIUM 10 MG/1
10 TABLET, FILM COATED ORAL DAILY
Status: DISCONTINUED | OUTPATIENT
Start: 2019-12-30 | End: 2020-01-08 | Stop reason: HOSPADM

## 2019-12-30 RX ORDER — SODIUM CHLORIDE 9 MG/ML
100 INJECTION, SOLUTION INTRAVENOUS CONTINUOUS
Status: DISCONTINUED | OUTPATIENT
Start: 2019-12-30 | End: 2020-01-01

## 2019-12-30 RX ORDER — NICOTINE 21 MG/24HR
1 PATCH, TRANSDERMAL 24 HOURS TRANSDERMAL DAILY
Status: DISCONTINUED | OUTPATIENT
Start: 2019-12-30 | End: 2020-01-08 | Stop reason: HOSPADM

## 2019-12-30 RX ADMIN — HYDROCODONE BITARTRATE AND ACETAMINOPHEN 1 TABLET: 10; 325 TABLET ORAL at 03:18

## 2019-12-30 RX ADMIN — NICOTINE 1 PATCH: 21 PATCH, EXTENDED RELEASE TRANSDERMAL at 02:41

## 2019-12-30 RX ADMIN — METHOCARBAMOL 500 MG: 500 TABLET ORAL at 14:49

## 2019-12-30 RX ADMIN — LORAZEPAM 1 MG: 2 INJECTION INTRAMUSCULAR; INTRAVENOUS at 08:54

## 2019-12-30 RX ADMIN — SODIUM CHLORIDE 100 ML/HR: 9 INJECTION, SOLUTION INTRAVENOUS at 02:40

## 2019-12-30 RX ADMIN — METHYLPREDNISOLONE SODIUM SUCCINATE 60 MG: 125 INJECTION, POWDER, FOR SOLUTION INTRAMUSCULAR; INTRAVENOUS at 08:55

## 2019-12-30 RX ADMIN — GUAIFENESIN 1200 MG: 600 TABLET, EXTENDED RELEASE ORAL at 08:51

## 2019-12-30 RX ADMIN — CLOPIDOGREL BISULFATE 75 MG: 75 TABLET, FILM COATED ORAL at 08:51

## 2019-12-30 RX ADMIN — SODIUM CHLORIDE, PRESERVATIVE FREE 10 ML: 5 INJECTION INTRAVENOUS at 02:43

## 2019-12-30 RX ADMIN — OSELTAMIVIR PHOSPHATE 75 MG: 75 CAPSULE ORAL at 02:42

## 2019-12-30 RX ADMIN — DIAZEPAM 2 MG: 2 TABLET ORAL at 21:41

## 2019-12-30 RX ADMIN — DIAZEPAM 2 MG: 2 TABLET ORAL at 14:49

## 2019-12-30 RX ADMIN — HYDROCODONE BITARTRATE AND ACETAMINOPHEN 1 TABLET: 10; 325 TABLET ORAL at 22:38

## 2019-12-30 RX ADMIN — METHOCARBAMOL 500 MG: 500 TABLET ORAL at 06:26

## 2019-12-30 RX ADMIN — METOPROLOL TARTRATE 12.5 MG: 25 TABLET, FILM COATED ORAL at 21:42

## 2019-12-30 RX ADMIN — SODIUM CHLORIDE 100 ML/HR: 9 INJECTION, SOLUTION INTRAVENOUS at 14:49

## 2019-12-30 RX ADMIN — GUAIFENESIN 1200 MG: 600 TABLET, EXTENDED RELEASE ORAL at 02:42

## 2019-12-30 RX ADMIN — SODIUM CHLORIDE, PRESERVATIVE FREE 10 ML: 5 INJECTION INTRAVENOUS at 21:44

## 2019-12-30 RX ADMIN — Medication 1 TABLET: at 12:33

## 2019-12-30 RX ADMIN — GUAIFENESIN 1200 MG: 600 TABLET, EXTENDED RELEASE ORAL at 22:37

## 2019-12-30 RX ADMIN — Medication 100 MG: at 02:42

## 2019-12-30 RX ADMIN — Medication 250 MG: at 21:43

## 2019-12-30 RX ADMIN — METHYLPREDNISOLONE SODIUM SUCCINATE 60 MG: 125 INJECTION, POWDER, FOR SOLUTION INTRAMUSCULAR; INTRAVENOUS at 02:42

## 2019-12-30 RX ADMIN — METOPROLOL TARTRATE 12.5 MG: 25 TABLET, FILM COATED ORAL at 08:51

## 2019-12-30 RX ADMIN — OSELTAMIVIR PHOSPHATE 75 MG: 75 CAPSULE ORAL at 22:37

## 2019-12-30 RX ADMIN — IPRATROPIUM BROMIDE AND ALBUTEROL SULFATE 3 ML: 2.5; .5 SOLUTION RESPIRATORY (INHALATION) at 19:25

## 2019-12-30 RX ADMIN — ENOXAPARIN SODIUM 40 MG: 40 INJECTION SUBCUTANEOUS at 08:55

## 2019-12-30 RX ADMIN — IPRATROPIUM BROMIDE AND ALBUTEROL SULFATE 3 ML: 2.5; .5 SOLUTION RESPIRATORY (INHALATION) at 07:24

## 2019-12-30 RX ADMIN — OSELTAMIVIR PHOSPHATE 75 MG: 75 CAPSULE ORAL at 08:51

## 2019-12-30 RX ADMIN — SODIUM CHLORIDE, PRESERVATIVE FREE 10 ML: 5 INJECTION INTRAVENOUS at 08:55

## 2019-12-30 RX ADMIN — LEVOFLOXACIN 750 MG: 5 INJECTION, SOLUTION INTRAVENOUS at 02:40

## 2019-12-30 RX ADMIN — METHOCARBAMOL 500 MG: 500 TABLET ORAL at 21:41

## 2019-12-30 RX ADMIN — PERFLUTREN 8.48 MG: 6.52 INJECTION, SUSPENSION INTRAVENOUS at 16:31

## 2019-12-30 RX ADMIN — LORAZEPAM 1 MG: 2 INJECTION INTRAMUSCULAR; INTRAVENOUS at 04:35

## 2019-12-30 RX ADMIN — IPRATROPIUM BROMIDE AND ALBUTEROL SULFATE 3 ML: 2.5; .5 SOLUTION RESPIRATORY (INHALATION) at 14:00

## 2019-12-30 RX ADMIN — Medication 250 MG: at 12:33

## 2019-12-30 RX ADMIN — ASPIRIN 325 MG: 325 TABLET ORAL at 08:51

## 2019-12-30 RX ADMIN — ATORVASTATIN CALCIUM 10 MG: 10 TABLET, FILM COATED ORAL at 08:51

## 2019-12-31 PROBLEM — J10.1 INFLUENZA A: Status: ACTIVE | Noted: 2019-12-31

## 2019-12-31 PROBLEM — E87.6 HYPOKALEMIA: Status: ACTIVE | Noted: 2019-12-31

## 2019-12-31 LAB
ANION GAP SERPL CALCULATED.3IONS-SCNC: 10 MMOL/L (ref 5–15)
ANION GAP SERPL CALCULATED.3IONS-SCNC: 10 MMOL/L (ref 5–15)
ANION GAP SERPL CALCULATED.3IONS-SCNC: 11 MMOL/L (ref 5–15)
ANION GAP SERPL CALCULATED.3IONS-SCNC: 9 MMOL/L (ref 5–15)
BUN BLD-MCNC: 10 MG/DL (ref 6–20)
BUN BLD-MCNC: 9 MG/DL (ref 6–20)
BUN/CREAT SERPL: 12.3 (ref 7–25)
BUN/CREAT SERPL: 16.1 (ref 7–25)
BUN/CREAT SERPL: 17.6 (ref 7–25)
BUN/CREAT SERPL: 18.9 (ref 7–25)
CALCIUM SPEC-SCNC: 7.9 MG/DL (ref 8.6–10.5)
CALCIUM SPEC-SCNC: 8 MG/DL (ref 8.6–10.5)
CHLORIDE SERPL-SCNC: 93 MMOL/L (ref 98–107)
CHLORIDE SERPL-SCNC: 94 MMOL/L (ref 98–107)
CK SERPL-CCNC: 1192 U/L (ref 20–200)
CO2 SERPL-SCNC: 25 MMOL/L (ref 22–29)
CO2 SERPL-SCNC: 25 MMOL/L (ref 22–29)
CO2 SERPL-SCNC: 26 MMOL/L (ref 22–29)
CO2 SERPL-SCNC: 29 MMOL/L (ref 22–29)
CREAT BLD-MCNC: 0.51 MG/DL (ref 0.76–1.27)
CREAT BLD-MCNC: 0.53 MG/DL (ref 0.76–1.27)
CREAT BLD-MCNC: 0.56 MG/DL (ref 0.76–1.27)
CREAT BLD-MCNC: 0.73 MG/DL (ref 0.76–1.27)
DEPRECATED RDW RBC AUTO: 39.4 FL (ref 37–54)
ERYTHROCYTE [DISTWIDTH] IN BLOOD BY AUTOMATED COUNT: 12.1 % (ref 12.3–15.4)
GFR SERPL CREATININE-BSD FRML MDRD: 111 ML/MIN/1.73
GFR SERPL CREATININE-BSD FRML MDRD: 150 ML/MIN/1.73
GFR SERPL CREATININE-BSD FRML MDRD: >150 ML/MIN/1.73
GFR SERPL CREATININE-BSD FRML MDRD: >150 ML/MIN/1.73
GLUCOSE BLD-MCNC: 101 MG/DL (ref 65–99)
GLUCOSE BLD-MCNC: 104 MG/DL (ref 65–99)
GLUCOSE BLD-MCNC: 110 MG/DL (ref 65–99)
GLUCOSE BLD-MCNC: 145 MG/DL (ref 65–99)
HCT VFR BLD AUTO: 42.6 % (ref 37.5–51)
HGB BLD-MCNC: 15.4 G/DL (ref 13–17.7)
MCH RBC QN AUTO: 31.8 PG (ref 26.6–33)
MCHC RBC AUTO-ENTMCNC: 36.2 G/DL (ref 31.5–35.7)
MCV RBC AUTO: 87.8 FL (ref 79–97)
PLATELET # BLD AUTO: 89 10*3/MM3 (ref 140–450)
PMV BLD AUTO: 10.1 FL (ref 6–12)
POTASSIUM BLD-SCNC: 3.2 MMOL/L (ref 3.5–5.2)
POTASSIUM BLD-SCNC: 3.3 MMOL/L (ref 3.5–5.2)
POTASSIUM BLD-SCNC: 3.5 MMOL/L (ref 3.5–5.2)
POTASSIUM BLD-SCNC: 3.6 MMOL/L (ref 3.5–5.2)
RBC # BLD AUTO: 4.85 10*6/MM3 (ref 4.14–5.8)
SODIUM BLD-SCNC: 128 MMOL/L (ref 136–145)
SODIUM BLD-SCNC: 129 MMOL/L (ref 136–145)
SODIUM BLD-SCNC: 130 MMOL/L (ref 136–145)
SODIUM BLD-SCNC: 131 MMOL/L (ref 136–145)
WBC NRBC COR # BLD: 5.8 10*3/MM3 (ref 3.4–10.8)

## 2019-12-31 PROCEDURE — 93010 ELECTROCARDIOGRAM REPORT: CPT | Performed by: INTERNAL MEDICINE

## 2019-12-31 PROCEDURE — 97110 THERAPEUTIC EXERCISES: CPT

## 2019-12-31 PROCEDURE — 80048 BASIC METABOLIC PNL TOTAL CA: CPT | Performed by: INTERNAL MEDICINE

## 2019-12-31 PROCEDURE — 94799 UNLISTED PULMONARY SVC/PX: CPT

## 2019-12-31 PROCEDURE — 63710000001 PREDNISONE PER 1 MG: Performed by: NURSE PRACTITIONER

## 2019-12-31 PROCEDURE — 25010000002 LEVOFLOXACIN PER 250 MG: Performed by: INTERNAL MEDICINE

## 2019-12-31 PROCEDURE — 94760 N-INVAS EAR/PLS OXIMETRY 1: CPT

## 2019-12-31 PROCEDURE — 93005 ELECTROCARDIOGRAM TRACING: CPT | Performed by: INTERNAL MEDICINE

## 2019-12-31 PROCEDURE — 97530 THERAPEUTIC ACTIVITIES: CPT

## 2019-12-31 PROCEDURE — 85027 COMPLETE CBC AUTOMATED: CPT | Performed by: NURSE PRACTITIONER

## 2019-12-31 PROCEDURE — 82550 ASSAY OF CK (CPK): CPT | Performed by: NURSE PRACTITIONER

## 2019-12-31 RX ORDER — METOPROLOL TARTRATE 50 MG/1
50 TABLET, FILM COATED ORAL DAILY
COMMUNITY
End: 2020-01-08 | Stop reason: HOSPADM

## 2019-12-31 RX ADMIN — CLOPIDOGREL BISULFATE 75 MG: 75 TABLET, FILM COATED ORAL at 08:57

## 2019-12-31 RX ADMIN — DIAZEPAM 2 MG: 2 TABLET ORAL at 05:34

## 2019-12-31 RX ADMIN — HYDROCODONE BITARTRATE AND ACETAMINOPHEN 1 TABLET: 10; 325 TABLET ORAL at 05:58

## 2019-12-31 RX ADMIN — Medication 250 MG: at 21:22

## 2019-12-31 RX ADMIN — IPRATROPIUM BROMIDE AND ALBUTEROL SULFATE 3 ML: 2.5; .5 SOLUTION RESPIRATORY (INHALATION) at 14:40

## 2019-12-31 RX ADMIN — IPRATROPIUM BROMIDE AND ALBUTEROL SULFATE 3 ML: 2.5; .5 SOLUTION RESPIRATORY (INHALATION) at 20:10

## 2019-12-31 RX ADMIN — METHOCARBAMOL 500 MG: 500 TABLET ORAL at 05:34

## 2019-12-31 RX ADMIN — DIAZEPAM 2 MG: 2 TABLET ORAL at 13:53

## 2019-12-31 RX ADMIN — HYDROCODONE BITARTRATE AND ACETAMINOPHEN 1 TABLET: 10; 325 TABLET ORAL at 19:20

## 2019-12-31 RX ADMIN — SODIUM CHLORIDE, PRESERVATIVE FREE 10 ML: 5 INJECTION INTRAVENOUS at 21:24

## 2019-12-31 RX ADMIN — ATORVASTATIN CALCIUM 10 MG: 10 TABLET, FILM COATED ORAL at 08:56

## 2019-12-31 RX ADMIN — DIAZEPAM 2 MG: 2 TABLET ORAL at 21:22

## 2019-12-31 RX ADMIN — METHOCARBAMOL 500 MG: 500 TABLET ORAL at 21:22

## 2019-12-31 RX ADMIN — LEVOFLOXACIN 750 MG: 5 INJECTION, SOLUTION INTRAVENOUS at 08:58

## 2019-12-31 RX ADMIN — OSELTAMIVIR PHOSPHATE 75 MG: 75 CAPSULE ORAL at 21:24

## 2019-12-31 RX ADMIN — SODIUM CHLORIDE 100 ML/HR: 9 INJECTION, SOLUTION INTRAVENOUS at 14:46

## 2019-12-31 RX ADMIN — Medication 250 MG: at 08:56

## 2019-12-31 RX ADMIN — SODIUM CHLORIDE 100 ML/HR: 9 INJECTION, SOLUTION INTRAVENOUS at 00:40

## 2019-12-31 RX ADMIN — IPRATROPIUM BROMIDE AND ALBUTEROL SULFATE 3 ML: 2.5; .5 SOLUTION RESPIRATORY (INHALATION) at 07:09

## 2019-12-31 RX ADMIN — SODIUM CHLORIDE, PRESERVATIVE FREE 10 ML: 5 INJECTION INTRAVENOUS at 08:59

## 2019-12-31 RX ADMIN — Medication 100 MG: at 08:57

## 2019-12-31 RX ADMIN — PREDNISONE 40 MG: 20 TABLET ORAL at 08:57

## 2019-12-31 RX ADMIN — GUAIFENESIN 1200 MG: 600 TABLET, EXTENDED RELEASE ORAL at 21:22

## 2019-12-31 RX ADMIN — OSELTAMIVIR PHOSPHATE 75 MG: 75 CAPSULE ORAL at 08:57

## 2019-12-31 RX ADMIN — METHOCARBAMOL 500 MG: 500 TABLET ORAL at 13:47

## 2019-12-31 RX ADMIN — METOPROLOL TARTRATE 12.5 MG: 25 TABLET, FILM COATED ORAL at 21:21

## 2019-12-31 RX ADMIN — GUAIFENESIN 1200 MG: 600 TABLET, EXTENDED RELEASE ORAL at 08:57

## 2019-12-31 RX ADMIN — METOPROLOL TARTRATE 12.5 MG: 25 TABLET, FILM COATED ORAL at 08:56

## 2019-12-31 RX ADMIN — NICOTINE 1 PATCH: 21 PATCH, EXTENDED RELEASE TRANSDERMAL at 08:57

## 2019-12-31 RX ADMIN — Medication 1 TABLET: at 08:57

## 2020-01-01 ENCOUNTER — APPOINTMENT (OUTPATIENT)
Dept: GENERAL RADIOLOGY | Facility: HOSPITAL | Age: 60
End: 2020-01-01

## 2020-01-01 PROBLEM — I48.0 PAROXYSMAL ATRIAL FIBRILLATION WITH RAPID VENTRICULAR RESPONSE (HCC): Status: ACTIVE | Noted: 2020-01-01

## 2020-01-01 LAB
ANION GAP SERPL CALCULATED.3IONS-SCNC: 10 MMOL/L (ref 5–15)
ANION GAP SERPL CALCULATED.3IONS-SCNC: 11 MMOL/L (ref 5–15)
ANION GAP SERPL CALCULATED.3IONS-SCNC: 11 MMOL/L (ref 5–15)
ARTERIAL PATENCY WRIST A: POSITIVE
ATMOSPHERIC PRESS: 747 MMHG
BACTERIA SPEC RESP CULT: NORMAL
BASE EXCESS BLDA CALC-SCNC: 2.1 MMOL/L (ref 0–2)
BDY SITE: ABNORMAL
BODY TEMPERATURE: 37 C
BUN BLD-MCNC: 8 MG/DL (ref 6–20)
BUN BLD-MCNC: 9 MG/DL (ref 6–20)
BUN BLD-MCNC: 9 MG/DL (ref 6–20)
BUN/CREAT SERPL: 11.3 (ref 7–25)
BUN/CREAT SERPL: 15 (ref 7–25)
BUN/CREAT SERPL: 16.1 (ref 7–25)
CALCIUM SPEC-SCNC: 7.6 MG/DL (ref 8.6–10.5)
CALCIUM SPEC-SCNC: 7.9 MG/DL (ref 8.6–10.5)
CALCIUM SPEC-SCNC: 8 MG/DL (ref 8.6–10.5)
CHLORIDE SERPL-SCNC: 93 MMOL/L (ref 98–107)
CHLORIDE SERPL-SCNC: 93 MMOL/L (ref 98–107)
CHLORIDE SERPL-SCNC: 94 MMOL/L (ref 98–107)
CK SERPL-CCNC: 967 U/L (ref 20–200)
CO2 SERPL-SCNC: 26 MMOL/L (ref 22–29)
CO2 SERPL-SCNC: 26 MMOL/L (ref 22–29)
CO2 SERPL-SCNC: 28 MMOL/L (ref 22–29)
CREAT BLD-MCNC: 0.56 MG/DL (ref 0.76–1.27)
CREAT BLD-MCNC: 0.6 MG/DL (ref 0.76–1.27)
CREAT BLD-MCNC: 0.71 MG/DL (ref 0.76–1.27)
GAS FLOW AIRWAY: 6 LPM
GFR SERPL CREATININE-BSD FRML MDRD: 114 ML/MIN/1.73
GFR SERPL CREATININE-BSD FRML MDRD: 139 ML/MIN/1.73
GFR SERPL CREATININE-BSD FRML MDRD: 150 ML/MIN/1.73
GLUCOSE BLD-MCNC: 146 MG/DL (ref 65–99)
GLUCOSE BLD-MCNC: 89 MG/DL (ref 65–99)
GLUCOSE BLD-MCNC: 90 MG/DL (ref 65–99)
GRAM STN SPEC: NORMAL
HCO3 BLDA-SCNC: 24.6 MMOL/L (ref 20–26)
Lab: ABNORMAL
Lab: ABNORMAL
MAGNESIUM SERPL-MCNC: 2 MG/DL (ref 1.6–2.6)
MODALITY: ABNORMAL
NOTIFIED BY: ABNORMAL
NOTIFIED WHO: ABNORMAL
NT-PROBNP SERPL-MCNC: 779.9 PG/ML (ref 5–900)
PCO2 BLDA: 32.2 MM HG (ref 35–45)
PH BLDA: 7.49 PH UNITS (ref 7.35–7.45)
PO2 BLDA: 39.1 MM HG (ref 83–108)
POTASSIUM BLD-SCNC: 2.9 MMOL/L (ref 3.5–5.2)
POTASSIUM BLD-SCNC: 3.2 MMOL/L (ref 3.5–5.2)
POTASSIUM BLD-SCNC: 3.3 MMOL/L (ref 3.5–5.2)
SAO2 % BLDCOA: 79.5 % (ref 94–99)
SODIUM BLD-SCNC: 130 MMOL/L (ref 136–145)
SODIUM BLD-SCNC: 131 MMOL/L (ref 136–145)
SODIUM BLD-SCNC: 131 MMOL/L (ref 136–145)
VENTILATOR MODE: ABNORMAL

## 2020-01-01 PROCEDURE — 71045 X-RAY EXAM CHEST 1 VIEW: CPT

## 2020-01-01 PROCEDURE — 80048 BASIC METABOLIC PNL TOTAL CA: CPT | Performed by: INTERNAL MEDICINE

## 2020-01-01 PROCEDURE — 25010000002 LEVOFLOXACIN PER 250 MG: Performed by: INTERNAL MEDICINE

## 2020-01-01 PROCEDURE — 82803 BLOOD GASES ANY COMBINATION: CPT

## 2020-01-01 PROCEDURE — 94799 UNLISTED PULMONARY SVC/PX: CPT

## 2020-01-01 PROCEDURE — 36600 WITHDRAWAL OF ARTERIAL BLOOD: CPT

## 2020-01-01 PROCEDURE — 25010000002 FUROSEMIDE PER 20 MG: Performed by: NURSE PRACTITIONER

## 2020-01-01 PROCEDURE — 83735 ASSAY OF MAGNESIUM: CPT | Performed by: NURSE PRACTITIONER

## 2020-01-01 PROCEDURE — 93010 ELECTROCARDIOGRAM REPORT: CPT | Performed by: INTERNAL MEDICINE

## 2020-01-01 PROCEDURE — 97535 SELF CARE MNGMENT TRAINING: CPT

## 2020-01-01 PROCEDURE — 83880 ASSAY OF NATRIURETIC PEPTIDE: CPT | Performed by: INTERNAL MEDICINE

## 2020-01-01 PROCEDURE — 63710000001 PREDNISONE PER 1 MG: Performed by: NURSE PRACTITIONER

## 2020-01-01 PROCEDURE — 97110 THERAPEUTIC EXERCISES: CPT

## 2020-01-01 PROCEDURE — 93005 ELECTROCARDIOGRAM TRACING: CPT | Performed by: NURSE PRACTITIONER

## 2020-01-01 PROCEDURE — 97530 THERAPEUTIC ACTIVITIES: CPT

## 2020-01-01 PROCEDURE — 93005 ELECTROCARDIOGRAM TRACING: CPT | Performed by: INTERNAL MEDICINE

## 2020-01-01 PROCEDURE — 82550 ASSAY OF CK (CPK): CPT | Performed by: NURSE PRACTITIONER

## 2020-01-01 RX ORDER — DILTIAZEM HYDROCHLORIDE 5 MG/ML
INJECTION INTRAVENOUS
Status: COMPLETED
Start: 2020-01-01 | End: 2020-01-01

## 2020-01-01 RX ORDER — METOPROLOL TARTRATE 5 MG/5ML
5 INJECTION INTRAVENOUS ONCE
Status: COMPLETED | OUTPATIENT
Start: 2020-01-01 | End: 2020-01-01

## 2020-01-01 RX ORDER — DILTIAZEM HYDROCHLORIDE 5 MG/ML
20 INJECTION INTRAVENOUS ONCE
Status: COMPLETED | OUTPATIENT
Start: 2020-01-01 | End: 2020-01-01

## 2020-01-01 RX ORDER — POTASSIUM CHLORIDE 750 MG/1
40 CAPSULE, EXTENDED RELEASE ORAL ONCE
Status: COMPLETED | OUTPATIENT
Start: 2020-01-01 | End: 2020-01-01

## 2020-01-01 RX ORDER — LEVOFLOXACIN 750 MG/1
750 TABLET ORAL EVERY 24 HOURS
Status: COMPLETED | OUTPATIENT
Start: 2020-01-02 | End: 2020-01-05

## 2020-01-01 RX ORDER — FUROSEMIDE 10 MG/ML
40 INJECTION INTRAMUSCULAR; INTRAVENOUS ONCE
Status: COMPLETED | OUTPATIENT
Start: 2020-01-01 | End: 2020-01-01

## 2020-01-01 RX ADMIN — Medication 100 MG: at 09:55

## 2020-01-01 RX ADMIN — Medication 1 TABLET: at 09:56

## 2020-01-01 RX ADMIN — SODIUM CHLORIDE, PRESERVATIVE FREE 10 ML: 5 INJECTION INTRAVENOUS at 20:49

## 2020-01-01 RX ADMIN — Medication 250 MG: at 09:56

## 2020-01-01 RX ADMIN — POTASSIUM CHLORIDE 40 MEQ: 750 CAPSULE, EXTENDED RELEASE ORAL at 15:40

## 2020-01-01 RX ADMIN — METOPROLOL TARTRATE 25 MG: 25 TABLET, FILM COATED ORAL at 20:49

## 2020-01-01 RX ADMIN — METOPROLOL TARTRATE 5 MG: 5 INJECTION INTRAVENOUS at 17:11

## 2020-01-01 RX ADMIN — DIAZEPAM 2 MG: 2 TABLET ORAL at 13:42

## 2020-01-01 RX ADMIN — PREDNISONE 40 MG: 20 TABLET ORAL at 09:55

## 2020-01-01 RX ADMIN — HYDROCODONE BITARTRATE AND ACETAMINOPHEN 1 TABLET: 10; 325 TABLET ORAL at 10:04

## 2020-01-01 RX ADMIN — DIAZEPAM 2 MG: 2 TABLET ORAL at 06:04

## 2020-01-01 RX ADMIN — ATORVASTATIN CALCIUM 10 MG: 10 TABLET, FILM COATED ORAL at 09:56

## 2020-01-01 RX ADMIN — METHOCARBAMOL 500 MG: 500 TABLET ORAL at 21:01

## 2020-01-01 RX ADMIN — GUAIFENESIN 1200 MG: 600 TABLET, EXTENDED RELEASE ORAL at 20:49

## 2020-01-01 RX ADMIN — METOPROLOL TARTRATE 12.5 MG: 25 TABLET, FILM COATED ORAL at 09:56

## 2020-01-01 RX ADMIN — METHOCARBAMOL 500 MG: 500 TABLET ORAL at 13:42

## 2020-01-01 RX ADMIN — SODIUM CHLORIDE 100 ML/HR: 9 INJECTION, SOLUTION INTRAVENOUS at 17:14

## 2020-01-01 RX ADMIN — CLOPIDOGREL BISULFATE 75 MG: 75 TABLET, FILM COATED ORAL at 09:56

## 2020-01-01 RX ADMIN — IPRATROPIUM BROMIDE AND ALBUTEROL SULFATE 3 ML: 2.5; .5 SOLUTION RESPIRATORY (INHALATION) at 11:49

## 2020-01-01 RX ADMIN — OSELTAMIVIR PHOSPHATE 75 MG: 75 CAPSULE ORAL at 09:55

## 2020-01-01 RX ADMIN — DIAZEPAM 2 MG: 2 TABLET ORAL at 21:02

## 2020-01-01 RX ADMIN — LEVOFLOXACIN 750 MG: 5 INJECTION, SOLUTION INTRAVENOUS at 09:56

## 2020-01-01 RX ADMIN — NICOTINE 1 PATCH: 21 PATCH, EXTENDED RELEASE TRANSDERMAL at 09:56

## 2020-01-01 RX ADMIN — SODIUM CHLORIDE 100 ML/HR: 9 INJECTION, SOLUTION INTRAVENOUS at 03:49

## 2020-01-01 RX ADMIN — OSELTAMIVIR PHOSPHATE 75 MG: 75 CAPSULE ORAL at 20:50

## 2020-01-01 RX ADMIN — GUAIFENESIN 1200 MG: 600 TABLET, EXTENDED RELEASE ORAL at 09:56

## 2020-01-01 RX ADMIN — DILTIAZEM HYDROCHLORIDE 5 MG/HR: 5 INJECTION INTRAVENOUS at 20:52

## 2020-01-01 RX ADMIN — METHOCARBAMOL 500 MG: 500 TABLET ORAL at 06:04

## 2020-01-01 RX ADMIN — IPRATROPIUM BROMIDE AND ALBUTEROL SULFATE 3 ML: 2.5; .5 SOLUTION RESPIRATORY (INHALATION) at 06:14

## 2020-01-01 RX ADMIN — DILTIAZEM HYDROCHLORIDE 20 MG: 5 INJECTION INTRAVENOUS at 20:46

## 2020-01-01 RX ADMIN — FUROSEMIDE 40 MG: 10 INJECTION, SOLUTION INTRAMUSCULAR; INTRAVENOUS at 18:17

## 2020-01-01 RX ADMIN — Medication 250 MG: at 20:49

## 2020-01-01 RX ADMIN — SODIUM CHLORIDE, PRESERVATIVE FREE 10 ML: 5 INJECTION INTRAVENOUS at 09:56

## 2020-01-01 NOTE — PROGRESS NOTES
RT Nebulizer Protocol    Assessment tool to be used for patients with existing breathing treatments ordered by hospitalists                                                                  0  1  2  3  4      Respiratory History   No Smoking      Smoking History      1 Pack/Day      Pulmonary Disease      Exacerbation   X     Respiratory Rate   Normal   X   20-25      Dyspneic      Accessory Muscles      Severe Dyspnea        Breath Sounds   Clear      Crackles   X   Crackles/ Rhonchi      Wheezing      Absent/ Severe Wheezing        Chest   X-ray   Clear      1 Lobe Infiltration/ Consolidation/ PE      2 Lobe Same Lung Infiltration/ Consolidation/ PE       2 Lobe Infiltration/ Both Lungs/ Consolidation/ PE      Both Lungs/ More Than 1 Lobe/ Atelectasis/ Consolidation/  PE        Cough   Strong Non- Productive      Excessive Secretions/ Strong Cough   X   Excessive Secretions/ Weak Cough      Thick Bronchial Secretions/ Weak Cough      Thick Bronchial Secretions/ No Cough        Total Patient Score =  6  0-4=Q4 PRN  5-9=TID and Q4 PRN  10-14=QID and Q3 PRN  15-19=Q4 and Q2 PRN  20=Q3 and Q2 PRN    Bronchopulmonary Hygiene (CPT)   Q4 ATC Copious secretions, dyspnea, unable to sleep, mucus plug    QID & Q4 PRN Moderate secretions    TID Small amounts of secretions w/ poor cough and history of secretions    BID Unable to deep breathe and cough spontaneously       Lung Expansion Therapy (PEP)   Q4 & PRN at night Severe atelectasis, poor oxygenation    QID  High risk for persistent atelectasis, existence of same    TID At risk for developing atelectasis    BID Unable to deep breathe and cough spontaneously     Instruct, 1 follow up Patients able to perform well on their own      PROTOCOL TO TID AND Q4 PRN. NO CURRENT CXR AVAILABLE IN LAST 24 HRS.

## 2020-01-01 NOTE — PLAN OF CARE
Problem: Patient Care Overview  Goal: Plan of Care Review  1/1/2020 1457 by Bobbi Martínez COTA/L  Outcome: Ongoing (interventions implemented as appropriate)  Flowsheets (Taken 1/1/2020 1426)  Outcome Summary: Pt in chair: answered call light and pt requesting to go BTB. Pt sit<>stand and performed fxl mobility short distance to bed Alberto via RW. Pt demo's SOA and is on O2. Pt demo's decreased balance posteriorly. Educated and given written HEP for pt to complete. Reported too fatigued to complete this date. Recommend continued UB strength and endurance for task. Recommend HH vs SNF pending progress

## 2020-01-01 NOTE — PLAN OF CARE
Problem: Patient Care Overview  Goal: Plan of Care Review  Outcome: Ongoing (interventions implemented as appropriate)  Flowsheets (Taken 1/1/2020 1340)  Progress: improving  Plan of Care Reviewed With: patient  Outcome Summary: PT tx completed. PT fowlers position in bed. Sats have been dropping into mid 80's all day. Agrees to get into chair. Bed mobility CG. Sit<>stand Alberto. Initially lost his balance backwards requiring assistance to recover. Took several steps forward/backward with r wx Alberto. Does not tolerate much activity due to weakness and oxygen level drops. Arom BLE's x 15 reps. Recommend inpatient rehab.

## 2020-01-01 NOTE — PROGRESS NOTES
Automatic IV to PO Pharmacy Note - Antimicrobial    Sascha Oden Sr. is a 59 y.o. male who meets the following criteria for IV to PO therapy conversion :     Tolerating oral fluids or 40ml/hour of enteral nutrition and oral route not otherwise compromised  Receiving other oral medications on a scheduled basis  Afebrile (temperature less than 100.4 degrees F) for at least 24 hours  WBC within/decreasing toward normal range    Lab Results   Component Value Date    WBC 5.80 12/31/2019     Temp Readings from Last 1 Encounters:   01/01/20 98.2 °F (36.8 °C) (Oral)       Assessment/Plan  Based on this criteria, Levaquin 750 mg IV Q24H has been changed to Levaquin 750 mg PO Q24H per the directives and guidelines established by [facility] and Therapeutics Committee and [facility] Medical Executive Committee .       Lorne Lara, PharmD  01/01/204:27 PM

## 2020-01-01 NOTE — PLAN OF CARE
Problem: Patient Care Overview  Goal: Plan of Care Review  Outcome: Ongoing (interventions implemented as appropriate)  Flowsheets (Taken 1/1/2020 0522)  Progress: improving  Plan of Care Reviewed With: patient  Note:   VSS. No change in neuro status. CIWA 2-7, continue w/tremors, no anxiety noted, but did have a headache at the beginning of the shift. No prn needed, only scheduled med given. Continue w/ dry congested sounding cough at times. Prn for pain only x 1. SCD in place. Safety maintained.

## 2020-01-01 NOTE — THERAPY TREATMENT NOTE
Acute Care - Physical Therapy Treatment Note  [facility]     Patient Name: Sascha Oden Sr.  : 1960  MRN: [id number]  Today's Date: 2020  Onset of Illness/Injury or Date of Surgery: 19     Referring Physician: Dr. Lang    Admit Date: 2019    Visit Dx:    ICD-10-CM ICD-9-CM   1. Traumatic rhabdomyolysis, initial encounter (CMS/[facility]) T79.6XXA 958.6   2. Hyponatremia E87.1 276.1   3. Dysphagia, unspecified type R13.10 787.20   4. Impaired functional mobility and activity tolerance Z74.09 V49.89   5. Impaired mobility and ADLs Z74.09 799.89     Patient Active Problem List   Diagnosis   • Pneumonia of both lower lobes due to infectious organism (CMS/[facility])   • Sinus tachycardia   • Tobacco abuse   • Emphysema of lung (CMS/[facility])   • Cough with hemoptysis   • Coffee ground emesis   • Paroxysmal SVT (supraventricular tachycardia) (CMS/[facility])   • Chest pain with low risk for cardiac etiology   • Hypercholesterolemia   • Hypertension   • Chronic back pain   • PAD (peripheral artery disease) (CMS/[facility])   • Rhabdomyolysis   • Hyponatremia   • Alcoholism /alcohol abuse (CMS/[facility])   • Syncope and collapse   • Diarrhea   • Cough   • COPD with acute exacerbation (CMS/[facility])   • Sepsis due to pneumonia (CMS/[facility])   • CAP (community acquired pneumonia)   • Thrombocytopenia (CMS/[facility])   • Hypokalemia   • Influenza A       Therapy Treatment    Rehabilitation Treatment Summary     Row Name 20 1311             Treatment Time/Intention    Discipline  physical therapy assistant  -KJ      Document Type  therapy note (daily note)  -KJ2      Subjective Information  complains of;weakness;dyspnea  -KJ2      Mode of Treatment  physical therapy  -KJ2      Patient Effort  good  -KJ2      Existing Precautions/Restrictions  fall;oxygen therapy device and L/min  -KJ2      Recorded by [KJ] Indira Le, YULY 20 1311  [KJ2] Indira Le, YULY 20 1340      Row Name 20 1311             Vital Signs    Pre SpO2 (%)   89  -KJ      O2 Delivery Pre Treatment  supplemental O2  -KJ      Intra SpO2 (%)  85  -KJ      O2 Delivery Intra Treatment  supplemental O2  -KJ      Post SpO2 (%)  89  -KJ      O2 Delivery Post Treatment  supplemental O2  -KJ      Pre Patient Position  Supine  -KJ      Intra Patient Position  Standing  -KJ      Post Patient Position  Sitting  -KJ      Recorded by [KJ] Indira Le, PTA 01/01/20 1340      Row Name 01/01/20 1311             Sit-Stand Transfer    Sit-Stand Stephenson (Transfers)  verbal cues;minimum assist (75% patient effort)  -KJ      Recorded by [KJ] Indira Le, PTA 01/01/20 1340      Row Name 01/01/20 1311             Stand-Sit Transfer    Stand-Sit Stephenson (Transfers)  verbal cues;minimum assist (75% patient effort)  -KJ      Recorded by [KJ] Indira Le, [facility] 01/01/20 1340      Row Name 01/01/20 1311             Gait/Stairs Assessment/Training    Stephenson Level (Gait)  minimum assist (75% patient effort);verbal cues  -KJ      Assistive Device (Gait)  walker, front-wheeled  -KJ      Distance in Feet (Gait)  5-6 forward/backward x 2; march in place  -KJ      Pattern (Gait)  step-to  -KJ      Deviations/Abnormal Patterns (Gait)  bilateral deviations;stride length decreased;gait speed decreased  -KJ      Bilateral Gait Deviations  weight shift ability decreased  -KJ      Comment (Gait/Stairs)  unsteady on feet; min assist due to LOB and weakness  -KJ      Recorded by [KJ] Indira Le, PTA 01/01/20 1340      Row Name 01/01/20 1311             Therapeutic Exercise    Exercise Type (Therapeutic Exercise)  AROM (active range of motion)  -KJ      Position (Therapeutic Exercise)  seated  -KJ      Sets/Reps (Therapeutic Exercise)  20  -KJ      Recorded by [KJ] Indira Le, PTA 01/01/20 1340      Row Name 01/01/20 1311             Positioning and Restraints    Pre-Treatment Position  in bed  -KJ      Post Treatment Position  chair  -KJ      Recorded by [KJ] Indira Le,  PTA 01/01/20 1340      Row Name 01/01/20 1311             Pain Scale: Numbers Pre/Post-Treatment    Pain Scale: Numbers, Pretreatment  4/10  -KJ      Pain Scale: Numbers, Post-Treatment  4/10  -KJ      Pain Location - Side  Bilateral  -KJ      Pain Location - Orientation  lower  -KJ      Pain Location  extremity  -KJ      Recorded by [TYLOR] Indira Le, PTA 01/01/20 1340        User Key  (r) = Recorded By, (t) = Taken By, (c) = Cosigned By    Initials Name Effective Dates Discipline    Indira Bautista, PTA 08/02/16 -  PT                       PT Recommendation and Plan     Plan of Care Reviewed With: patient  Progress: improving  Outcome Summary: PT tx completed. PT fowlers position in bed. Sats have been dropping into mid 80's all day. Agrees to get into chair. Bed mobility CG. Sit<>stand Alberto. Initially lost his balance backwards requiring assistance to recover. Took several steps forward/backward with r wx Alberto. Does not tolerate much activity due to weakness and oxygen level drops. Arom BLE's x 15 reps. Recommend inpatient rehab.  Outcome Measures     Row Name 12/31/19 1455 12/30/19 0917          How much help from another person do you currently need...    Turning from your back to your side while in flat bed without using bedrails?  3  -AF  --     Moving from lying on back to sitting on the side of a flat bed without bedrails?  3  -AF  --     Moving to and from a bed to a chair (including a wheelchair)?  3  -AF  --     Standing up from a chair using your arms (e.g., wheelchair, bedside chair)?  3  -AF  --     Climbing 3-5 steps with a railing?  2  -AF  --     To walk in hospital room?  2  -AF  --     AM-PAC 6 Clicks Score (PT)  16  -AF  --        How much help from another is currently needed...    Putting on and taking off regular lower body clothing?  --  3  -MM     Bathing (including washing, rinsing, and drying)  --  3  -MM     Toileting (which includes using toilet bed pan or urinal)  --  3  -MM      Putting on and taking off regular upper body clothing  --  3  -MM     Taking care of personal grooming (such as brushing teeth)  --  3  -MM     Eating meals  --  3  -MM     AM-PAC 6 Clicks Score (OT)  --  18  -MM        Functional Assessment    Outcome Measure Options  AM-PAC 6 Clicks Basic Mobility (PT)  -AF  AM-PAC 6 Clicks Daily Activity (OT)  -MM       User Key  (r) = Recorded By, (t) = Taken By, (c) = Cosigned By    Initials Name Provider Type    MM Varun Juarez, OTR/L Occupational Therapist    AF Stacey Conde PTA Physical Therapy Assistant         Time Calculation:   PT Charges     Row Name 01/01/20 1340             Time Calculation    Start Time  1311  -KJ      Stop Time  1340  -KJ      Time Calculation (min)  29 min  -KJ      PT Received On  01/01/20  -KJ      PT Goal Re-Cert Due Date  01/09/20  -KJ         Time Calculation- PT    Total Timed Code Minutes- PT  29 minute(s)  -KJ        User Key  (r) = Recorded By, (t) = Taken By, (c) = Cosigned By    Initials Name Provider Type    Indira Bautista PTA Physical Therapy Assistant        Therapy Charges for Today     Code Description Service Date Service Provider Modifiers Qty    01627379020 HC PT THER PROC EA 15 MIN 1/1/2020 Indira Le PTA GP 1    55053590588 HC PT THERAPEUTIC ACT EA 15 MIN 1/1/2020 Indira Le PTA GP 1          PT G-Codes  Outcome Measure Options: AM-PAC 6 Clicks Basic Mobility (PT)  AM-PAC 6 Clicks Score (PT): 16  AM-PAC 6 Clicks Score (OT): 18    Indira Le PTA  1/1/2020

## 2020-01-01 NOTE — SIGNIFICANT NOTE
Monitor room called stating pt was in A-fib with -180s. Pt asymptomatic. FORTINO Beckett paged. EKG, IV lopressor, ABGs, vapotherm ordered. Pt is now in sinus rhythm with O2 sat in 90s on vapotherm.

## 2020-01-01 NOTE — PROGRESS NOTES
[facility] Medicine Services  INPATIENT PROGRESS NOTE    Patient Name: Sascha Oden Sr.  Date of Admission: 12/29/2019  Today's Date: 01/01/20  Length of Stay: 3  Primary Care Physician: Narendra Camara MD    Subjective   Chief Complaint: Follow-up syncope and collapse  HPI   Patient was seen and examined on 2 different occasions.  During acute event of patient's atrial fibrillation with rapid ventricular response -he reported a cough but he could not tell that his heart was racing.  He denied shortness of breath, chest pain or pressure, and palpitations.  He was asking if I can assist him with his tray to set him up for dinner.    Review of Systems  All pertinent negatives and positives are as above. All other systems have been reviewed and are negative unless otherwise stated.     Objective    Temp:  [96 °F (35.6 °C)-99.1 °F (37.3 °C)] 96 °F (35.6 °C)  Heart Rate:  [] 100  Resp:  [16-20] 18  BP: (117-138)/(61-87) 138/87  Physical Exam  Constitutional: He is oriented to person, place, and time. He appears well-developed and well-nourished. No distress.   Unkept  HENT:   Head: Normocephalic and atraumatic.   Dry oral mucosa   Neck: Normal range of motion. Neck supple. No JVD present. No tracheal deviation present.   Cardiovascular: An irregularly irregular rhythm present. Intact distal pulses. Exam reveals no gallop and no friction rub.   Atrial fibrillation with a rate of 160s to 200s.  After IV Lopressor was given at 1711 he has remained sinus 80s to 90s.  Pulmonary/Chest: Effort normal. He has no wheezes. He has rhonchi.    Diminished in bilateral bases with poor air entry  Abdominal: Soft. Bowel sounds are normal. He exhibits no distension. There is no tenderness. There is no guarding.   Genitourinary   Genitourinary comments: voiding per urinal  Musculoskeletal: Normal range of motion. He exhibits no edema or tenderness.   Neurological: He is alert and oriented to  person, place, and time. No cranial nerve deficit.   Skin: Skin is warm and dry. No rash noted. No erythema.   Psychiatric: He has a normal mood and affect. His behavior is normal. Judgment and thought content normal.   Vitals reviewed.       Results Review:  I have reviewed the labs, radiology results, and diagnostic studies.    Laboratory Data:   Results from last 7 days   Lab Units 12/31/19  0110 12/30/19  0254 12/29/19 1957   WBC 10*3/mm3 5.80 4.90 6.30   HEMOGLOBIN g/dL 15.4 15.3 16.1   HEMATOCRIT % 42.6 41.7 44.6   PLATELETS 10*3/mm3 89* 87* 97*        Results from last 7 days   Lab Units 01/01/20  1318 01/01/20  0713 01/01/20  0211  12/30/19 0254 12/29/19 1957   SODIUM mmol/L 130* 131* 131*   < > 120* 121*   POTASSIUM mmol/L 3.2* 2.9* 3.3*   < > 3.5 3.7   CHLORIDE mmol/L 93* 94* 93*   < > 83* 80*   CO2 mmol/L 26.0 26.0 28.0   < > 23.0 23.0   BUN mg/dL 9 9 8   < > 11 11   CREATININE mg/dL 0.60* 0.56* 0.71*   < > 0.53* 0.70*   CALCIUM mg/dL 8.0* 7.6* 7.9*   < > 7.7* 8.2*   BILIRUBIN mg/dL  --   --   --   --  0.3 0.3   ALK PHOS U/L  --   --   --   --  50 57   ALT (SGPT) U/L  --   --   --   --  66* 75*   AST (SGOT) U/L  --   --   --   --  132* 146*   GLUCOSE mg/dL 146* 90 89   < > 111* 88    < > = values in this interval not displayed.     I have reviewed the patient's current medications.     Assessment/Plan     Active Hospital Problems    Diagnosis   • **Sepsis due to pneumonia (CMS/HCC)   • Paroxysmal atrial fibrillation with rapid ventricular response (CMS/HCC)   • Hypokalemia   • Influenza A   • Hyponatremia   • Alcoholism /alcohol abuse (CMS/HCC)   • Syncope and collapse   • Diarrhea   • Cough   • COPD with acute exacerbation (CMS/HCC)   • CAP (community acquired pneumonia)   • Thrombocytopenia (CMS/HCC)   • Rhabdomyolysis   • Hypertension   • Tobacco abuse       Plan:  1.  At 1629, I was notified the patient was in A. fib RVR with a rate of 160s to 180s on telemetry, has high as low 200s.  Blood  pressure 138/87, respiratory rate of 22, temperature of 96 °F. STAT EKG was obtained that revealed A. fib with RVR rate 172.  Stat ABG revealed PO2 of 39 with O2 saturation of 79.5.  He has been switched to heated high flow for goal of oxygen saturation of 90 to 95%.  We will give him a one-time dose of IV Lasix and increase his beta-blocker dosage.  He has been receiving 12.5 mg twice daily Lopressor, will increase to 25 mg.  At home he does take 50 mg daily.  After IV Lopressor was given at 1711 he has since converted to sinus rhythm with a rate of 80 to 90s with last blood pressure of 118/80.  Oxygen saturation of 92% on heated high flow 40 L and 80%.  -Patient tells me that he thinks he may have a history of an irregular heart rhythm.  His [id number] score is a 2 (history of hypertension and vascular disease).  May need to consider risk versus benefit of starting anticoagulation.  2.  Continue Levaquin for concern of bilateral pneumonia.  Duo nebs, Mucinex, and incentive spirometry.  Continue Tamiflu for influenza A..  3. Continue oral prednisone.    4.  Blood cultures with no growth at 2 days.  5.  Continue CIWA protocol and monitor closely for signs or symptoms of withdrawal.  Oral vitamin replacement.  Continue scheduled oral Valium.  6.  Hyponatremia continues to slowly improve with a level of 130 today.  Likely related to alcohol abuse.  Continue gentle IV fluids.    7.  The patient is currently requiring 4 L per nasal cannula.  Wean supplemental oxygen as tolerated.  Patient does not normally wear oxygen at home.  8.  Creatinine kinase slowly improving with a level of 967.  9.  Plavix for history of peripheral arterial disease.  Monitor platelet count closely, thrombocytopenia likely related to acute illness along with alcohol abuse.   10.  Lovenox for VTE prophylaxis.  11.  Physical and Occupational Therapy.  Therapy recommends home health versus skilled nursing facility upon discharge.  12.  Labs in  AM.    Discharge Planning: I expect the patient to be discharged to home in 2-4 days.    FORTINO Gandara   01/01/20   5:42 PM    I personally evaluated and examined the patient in conjunction with FORTINO Schilling and agree with the assessment, treatment plan, and disposition of the patient as recorded by her. My history, exam, and further recommendations are:     Patient seen and examined.  Appears to be worsening somewhat today.  Worsening shortness of breath.  Also had a event of A. fib RVR.    GEN: Awake, alert, interactive, in NAD  HEENT: Atraumatic, PERRLA, EOMI, Anicteric, Trachea midline  Lungs: Coarse sounds bilaterally, no wheezing  Heart: irreg/irreg, +S1/s2, no rub  ABD: soft, nt/nd, +BS, no guarding/rebound  Extremities: atraumatic, no cyanosis  Skin: no rashes or lesions  Neuro: AAOx3, no focal deficits  Psych: normal mood & affect    Patient with worsening respiratory failure.  Suspect possible fluid overload as we have been given him IV fluids for his rhabdo.  He does have diastolic dysfunction on his echo.  Sounds potentially wet.  DC IV fluids and start diuretics.  Stat chest x-ray.  Also of note he went A. fib RVR today was given 5 IV Lopressor with return to sinus rhythm.  Increase oral metoprolol dose.  Patient is now on 40 L 80% Vapotherm.  Will transfer to the ICU for closer monitoring and care.  Continue diuresis.    [place],   01/01/20  6:42 PM

## 2020-01-02 PROBLEM — J96.01 ACUTE RESPIRATORY FAILURE WITH HYPOXIA (HCC): Status: ACTIVE | Noted: 2020-01-02

## 2020-01-02 LAB
ANION GAP SERPL CALCULATED.3IONS-SCNC: 10 MMOL/L (ref 5–15)
BUN BLD-MCNC: 10 MG/DL (ref 6–20)
BUN/CREAT SERPL: 12.8 (ref 7–25)
CALCIUM SPEC-SCNC: 8.1 MG/DL (ref 8.6–10.5)
CHLORIDE SERPL-SCNC: 96 MMOL/L (ref 98–107)
CK SERPL-CCNC: 1059 U/L (ref 20–200)
CO2 SERPL-SCNC: 30 MMOL/L (ref 22–29)
CREAT BLD-MCNC: 0.78 MG/DL (ref 0.76–1.27)
DEPRECATED RDW RBC AUTO: 41.6 FL (ref 37–54)
ERYTHROCYTE [DISTWIDTH] IN BLOOD BY AUTOMATED COUNT: 12.6 % (ref 12.3–15.4)
GFR SERPL CREATININE-BSD FRML MDRD: 103 ML/MIN/1.73
GLUCOSE BLD-MCNC: 93 MG/DL (ref 65–99)
HCT VFR BLD AUTO: 42.6 % (ref 37.5–51)
HGB BLD-MCNC: 15.1 G/DL (ref 13–17.7)
MAGNESIUM SERPL-MCNC: 2.1 MG/DL (ref 1.6–2.6)
MCH RBC QN AUTO: 31.7 PG (ref 26.6–33)
MCHC RBC AUTO-ENTMCNC: 35.4 G/DL (ref 31.5–35.7)
MCV RBC AUTO: 89.3 FL (ref 79–97)
MYOGLOBIN UR-MCNC: 3 NG/ML (ref 0–13)
PHOSPHATE SERPL-MCNC: 3.2 MG/DL (ref 2.5–4.5)
PLATELET # BLD AUTO: 88 10*3/MM3 (ref 140–450)
PMV BLD AUTO: 9.9 FL (ref 6–12)
POTASSIUM BLD-SCNC: 3.5 MMOL/L (ref 3.5–5.2)
PROCALCITONIN SERPL-MCNC: 0.15 NG/ML (ref 0.1–0.25)
RBC # BLD AUTO: 4.77 10*6/MM3 (ref 4.14–5.8)
SODIUM BLD-SCNC: 136 MMOL/L (ref 136–145)
WBC NRBC COR # BLD: 4.6 10*3/MM3 (ref 3.4–10.8)

## 2020-01-02 PROCEDURE — 97530 THERAPEUTIC ACTIVITIES: CPT

## 2020-01-02 PROCEDURE — 94799 UNLISTED PULMONARY SVC/PX: CPT

## 2020-01-02 PROCEDURE — 97110 THERAPEUTIC EXERCISES: CPT

## 2020-01-02 PROCEDURE — 84145 PROCALCITONIN (PCT): CPT | Performed by: INTERNAL MEDICINE

## 2020-01-02 PROCEDURE — 80048 BASIC METABOLIC PNL TOTAL CA: CPT | Performed by: INTERNAL MEDICINE

## 2020-01-02 PROCEDURE — 25010000002 FUROSEMIDE PER 20 MG: Performed by: INTERNAL MEDICINE

## 2020-01-02 PROCEDURE — 85027 COMPLETE CBC AUTOMATED: CPT | Performed by: NURSE PRACTITIONER

## 2020-01-02 PROCEDURE — 83735 ASSAY OF MAGNESIUM: CPT | Performed by: INTERNAL MEDICINE

## 2020-01-02 PROCEDURE — 97116 GAIT TRAINING THERAPY: CPT

## 2020-01-02 PROCEDURE — 25010000002 METHYLPREDNISOLONE PER 40 MG: Performed by: INTERNAL MEDICINE

## 2020-01-02 PROCEDURE — 82550 ASSAY OF CK (CPK): CPT | Performed by: NURSE PRACTITIONER

## 2020-01-02 PROCEDURE — 94760 N-INVAS EAR/PLS OXIMETRY 1: CPT

## 2020-01-02 PROCEDURE — 84100 ASSAY OF PHOSPHORUS: CPT | Performed by: INTERNAL MEDICINE

## 2020-01-02 RX ORDER — FUROSEMIDE 10 MG/ML
40 INJECTION INTRAMUSCULAR; INTRAVENOUS
Status: DISCONTINUED | OUTPATIENT
Start: 2020-01-02 | End: 2020-01-07

## 2020-01-02 RX ORDER — IPRATROPIUM BROMIDE AND ALBUTEROL SULFATE 2.5; .5 MG/3ML; MG/3ML
3 SOLUTION RESPIRATORY (INHALATION)
Status: DISCONTINUED | OUTPATIENT
Start: 2020-01-02 | End: 2020-01-03

## 2020-01-02 RX ORDER — METHYLPREDNISOLONE SODIUM SUCCINATE 40 MG/ML
40 INJECTION, POWDER, LYOPHILIZED, FOR SOLUTION INTRAMUSCULAR; INTRAVENOUS EVERY 6 HOURS
Status: DISCONTINUED | OUTPATIENT
Start: 2020-01-02 | End: 2020-01-04

## 2020-01-02 RX ORDER — POTASSIUM CHLORIDE 750 MG/1
40 CAPSULE, EXTENDED RELEASE ORAL ONCE
Status: COMPLETED | OUTPATIENT
Start: 2020-01-02 | End: 2020-01-02

## 2020-01-02 RX ADMIN — OSELTAMIVIR PHOSPHATE 75 MG: 75 CAPSULE ORAL at 21:03

## 2020-01-02 RX ADMIN — METHYLPREDNISOLONE SODIUM SUCCINATE 40 MG: 40 INJECTION, POWDER, FOR SOLUTION INTRAMUSCULAR; INTRAVENOUS at 21:03

## 2020-01-02 RX ADMIN — METOPROLOL TARTRATE 25 MG: 25 TABLET, FILM COATED ORAL at 21:03

## 2020-01-02 RX ADMIN — SODIUM CHLORIDE, PRESERVATIVE FREE 10 ML: 5 INJECTION INTRAVENOUS at 10:00

## 2020-01-02 RX ADMIN — LEVOFLOXACIN 750 MG: 750 TABLET, FILM COATED ORAL at 13:50

## 2020-01-02 RX ADMIN — DIAZEPAM 2 MG: 2 TABLET ORAL at 21:03

## 2020-01-02 RX ADMIN — POTASSIUM CHLORIDE 40 MEQ: 750 CAPSULE, EXTENDED RELEASE ORAL at 09:47

## 2020-01-02 RX ADMIN — METHOCARBAMOL 500 MG: 500 TABLET ORAL at 05:59

## 2020-01-02 RX ADMIN — GUAIFENESIN 1200 MG: 600 TABLET, EXTENDED RELEASE ORAL at 09:46

## 2020-01-02 RX ADMIN — METHOCARBAMOL 500 MG: 500 TABLET ORAL at 21:03

## 2020-01-02 RX ADMIN — Medication 100 MG: at 09:46

## 2020-01-02 RX ADMIN — METOPROLOL TARTRATE 25 MG: 25 TABLET, FILM COATED ORAL at 09:46

## 2020-01-02 RX ADMIN — SODIUM CHLORIDE, PRESERVATIVE FREE 10 ML: 5 INJECTION INTRAVENOUS at 21:04

## 2020-01-02 RX ADMIN — CLOPIDOGREL BISULFATE 75 MG: 75 TABLET, FILM COATED ORAL at 09:46

## 2020-01-02 RX ADMIN — METHYLPREDNISOLONE SODIUM SUCCINATE 40 MG: 40 INJECTION, POWDER, FOR SOLUTION INTRAMUSCULAR; INTRAVENOUS at 09:46

## 2020-01-02 RX ADMIN — OSELTAMIVIR PHOSPHATE 75 MG: 75 CAPSULE ORAL at 09:46

## 2020-01-02 RX ADMIN — IPRATROPIUM BROMIDE AND ALBUTEROL SULFATE 3 ML: 2.5; .5 SOLUTION RESPIRATORY (INHALATION) at 12:03

## 2020-01-02 RX ADMIN — IPRATROPIUM BROMIDE AND ALBUTEROL SULFATE 3 ML: 2.5; .5 SOLUTION RESPIRATORY (INHALATION) at 07:14

## 2020-01-02 RX ADMIN — DIAZEPAM 2 MG: 2 TABLET ORAL at 13:50

## 2020-01-02 RX ADMIN — METHYLPREDNISOLONE SODIUM SUCCINATE 40 MG: 40 INJECTION, POWDER, FOR SOLUTION INTRAMUSCULAR; INTRAVENOUS at 16:13

## 2020-01-02 RX ADMIN — FUROSEMIDE 40 MG: 10 INJECTION, SOLUTION INTRAVENOUS at 10:00

## 2020-01-02 RX ADMIN — GUAIFENESIN 1200 MG: 600 TABLET, EXTENDED RELEASE ORAL at 21:03

## 2020-01-02 RX ADMIN — ATORVASTATIN CALCIUM 10 MG: 10 TABLET, FILM COATED ORAL at 09:46

## 2020-01-02 RX ADMIN — DIAZEPAM 2 MG: 2 TABLET ORAL at 05:59

## 2020-01-02 RX ADMIN — Medication 1 TABLET: at 09:46

## 2020-01-02 RX ADMIN — ACETAMINOPHEN 650 MG: 325 TABLET, FILM COATED ORAL at 16:13

## 2020-01-02 RX ADMIN — Medication 250 MG: at 21:03

## 2020-01-02 RX ADMIN — METHOCARBAMOL 500 MG: 500 TABLET ORAL at 13:50

## 2020-01-02 RX ADMIN — NICOTINE 1 PATCH: 21 PATCH, EXTENDED RELEASE TRANSDERMAL at 09:47

## 2020-01-02 RX ADMIN — Medication 250 MG: at 09:46

## 2020-01-02 RX ADMIN — FUROSEMIDE 40 MG: 10 INJECTION, SOLUTION INTRAVENOUS at 18:13

## 2020-01-02 RX ADMIN — IPRATROPIUM BROMIDE AND ALBUTEROL SULFATE 3 ML: 2.5; .5 SOLUTION RESPIRATORY (INHALATION) at 18:10

## 2020-01-02 NOTE — PLAN OF CARE
Pt did well on 40L and 80% vapotherm. Short episode of afib with rvr. Bolus cardizem and drip started. Converted back to sr a short while later. Cardizem drip off. Output adequate. Did not score over 2 on ciwa score.

## 2020-01-02 NOTE — THERAPY TREATMENT NOTE
Acute Care - Physical Therapy Treatment Note  [facility]     Patient Name: Sascha Oden Sr.  : 1960  MRN: [id number]  Today's Date: 2020  Onset of Illness/Injury or Date of Surgery: 19     Referring Physician: Dr. Lang    Admit Date: 2019    Visit Dx:    ICD-10-CM ICD-9-CM   1. Traumatic rhabdomyolysis, initial encounter (CMS/Formerly [facility]) T79.6XXA 958.6   2. Hyponatremia E87.1 276.1   3. Dysphagia, unspecified type R13.10 787.20   4. Impaired functional mobility and activity tolerance Z74.09 V49.89   5. Impaired mobility and ADLs Z74.09 799.89     Patient Active Problem List   Diagnosis   • Pneumonia of both lower lobes due to infectious organism (CMS/Formerly [facility])   • Sinus tachycardia   • Tobacco abuse   • Emphysema of lung (CMS/Formerly [facility])   • Cough with hemoptysis   • Coffee ground emesis   • Paroxysmal SVT (supraventricular tachycardia) (CMS/Formerly [facility])   • Chest pain with low risk for cardiac etiology   • Hypercholesterolemia   • Hypertension   • Chronic back pain   • PAD (peripheral artery disease) (CMS/Formerly [facility])   • Rhabdomyolysis   • Hyponatremia   • Alcoholism /alcohol abuse (CMS/Formerly [facility])   • Syncope and collapse   • Diarrhea   • Cough   • COPD with acute exacerbation (CMS/Formerly [facility])   • CAP (community acquired pneumonia)   • Thrombocytopenia (CMS/[facility]ly [facility])   • Hypokalemia   • Influenza A   • Paroxysmal atrial fibrillation with rapid ventricular response (CMS/Formerly [facility])   • Acute respiratory failure with hypoxia (CMS/[facility]ly [facility])       Therapy Treatment    Rehabilitation Treatment Summary     Row Name 20 1000             Treatment Time/Intention    Discipline  physical therapy assistant  -AE      Document Type  therapy note (daily note)  -AE      Subjective Information  complains of;weakness  -AE      Existing Precautions/Restrictions  fall;oxygen therapy device and L/min  -AE      Recorded by [AE] Laura Muniz PTA 20 1034      Row Name 20 1000             Vital Signs    Pre SpO2 (%)  97  -AE      O2 Delivery Pre Treatment   -- vapotherm  -AE      Intra SpO2 (%)  89  -AE      O2 Delivery Intra Treatment  -- vapotherm  -AE      Post SpO2 (%)  89  -AE      O2 Delivery Post Treatment  -- vapotherm  -AE      Recorded by [AE] Laura Muniz, [facility] 01/02/20 1034      Row Name 01/02/20 1000             Bed Mobility Assessment/Treatment    Supine-Sit [place] (Bed Mobility)  contact guard;verbal cues  -AE      Recorded by [AE] Laura Muniz [facility] 01/02/20 1034      Row Name 01/02/20 1000             Sit-Stand Transfer    Sit-Stand [place] (Transfers)  minimum assist (75% patient effort);verbal cues  -AE      Recorded by [AE] Laura Muniz [facility] 01/02/20 1034      Row Name 01/02/20 1000             Gait/Stairs Assessment/Training    [place] Level (Gait)  minimum assist (75% patient effort);verbal cues  -AE      Assistive Device (Gait)  walker, front-wheeled  -AE      Distance in Feet (Gait)  -- steps to chair then 5 steps forward and back x 2 reps  -AE      Deviations/Abnormal Patterns (Gait)  stride length decreased  -AE      Bilateral Gait Deviations  forward flexed posture  -AE      Recorded by [AE] Laura Muniz PTA 01/02/20 1034      Row Name 01/02/20 1000             Therapeutic Exercise    Lower Extremity (Therapeutic Exercise)  LAQ (long arc quad), bilateral  -AE      Lower Extremity Range of Motion (Therapeutic Exercise)  ankle dorsiflexion/plantar flexion, bilateral  -AE      Exercise Type (Therapeutic Exercise)  AROM (active range of motion)  -AE      Position (Therapeutic Exercise)  seated  -AE      Sets/Reps (Therapeutic Exercise)  20  -AE      Recorded by [AE] Laura Muniz PTA 01/02/20 1034      Row Name 01/02/20 1000             Positioning and Restraints    Pre-Treatment Position  in bed  -AE      Post Treatment Position  chair  -AE      In Chair  sitting;call light within reach  -AE      Recorded by [AE] Laura Muniz PTA 01/02/20 1034      Row Name 01/02/20 1000             Pain Scale: Numbers  Pre/Post-Treatment    Pain Scale: Numbers, Pretreatment  0/10 - no pain  -AE      Pain Scale: Numbers, Post-Treatment  0/10 - no pain  -AE      Recorded by [AE] Laura Muniz, PTA 01/02/20 1034        User Key  (r) = Recorded By, (t) = Taken By, (c) = Cosigned By    Initials Name Effective Dates Discipline    AE Laura Muniz, PTA 06/22/15 -  PT                       PT Recommendation and Plan     Progress: no change  Outcome Summary: PT tx completed. PT fowlers position in bed. Sats have been dropping into mid 80's all day. Agrees to get into chair. Bed mobility CG. Sit<>stand [place]. Initially lost his balance backwards requiring assistance to recover. Took several steps forward/backward with r wx [place]. Does not tolerate much activity due to weakness and oxygen level drops. Arom BLE's x 15 reps. Recommend inpatient rehab.  Outcome Measures     Row Name 01/01/20 1426 12/31/19 1455          How much help from another person do you currently need...    Turning from your back to your side while in flat bed without using bedrails?  --  3  -AF     Moving from lying on back to sitting on the side of a flat bed without bedrails?  --  3  -AF     Moving to and from a bed to a chair (including a wheelchair)?  --  3  -AF     Standing up from a chair using your arms (e.g., wheelchair, bedside chair)?  --  3  -AF     Climbing 3-5 steps with a railing?  --  2  -AF     To walk in hospital room?  --  2  -AF     AM-PAC 6 Clicks Score (PT)  --  16  -AF        How much help from another is currently needed...    Putting on and taking off regular lower body clothing?  3  -MT  --     Bathing (including washing, rinsing, and drying)  3  -MT  --     Toileting (which includes using toilet bed pan or urinal)  3  -MT  --     Putting on and taking off regular upper body clothing  3  -MT  --     Taking care of personal grooming (such as brushing teeth)  3  -MT  --     Eating meals  4  -MT  --     AM-PAC 6 Clicks Score (OT)  19  -MT  --         Functional Assessment    Outcome Measure Options  --  AM-PAC 6 Clicks Basic Mobility (PT)  -AF       User Key  (r) = Recorded By, (t) = Taken By, (c) = Cosigned By    Initials Name Provider Type    Bobbi Calvo COTA/L Occupational Therapy Assistant    AF Stacey Conde, YULY Physical Therapy Assistant         Time Calculation:   PT Charges     Row Name 01/02/20 1038             Time Calculation    Start Time  1000  -AE      Stop Time  1024  -AE      Time Calculation (min)  24 min  -AE      PT Received On  01/02/20  -AE      PT Goal Re-Cert Due Date  01/09/20  -AE         Time Calculation- PT    Total Timed Code Minutes- PT  24 minute(s)  -AE         Timed Charges    67320 - PT Therapeutic Exercise Minutes  8  -AE      83615 - Gait Training Minutes   15  -AE        User Key  (r) = Recorded By, (t) = Taken By, (c) = Cosigned By    Initials Name Provider Type    AE Laura Muniz PTA Physical Therapy Assistant        Therapy Charges for Today     Code Description Service Date Service Provider Modifiers Qty    06344011096 HC GAIT TRAINING EA 15 MIN 1/2/2020 Laura Muniz PTA GP 1    07442720627 HC PT THER PROC EA 15 MIN 1/2/2020 Laura Muniz PTA GP 1          PT G-Codes  Outcome Measure Options: AM-PAC 6 Clicks Basic Mobility (PT)  AM-PAC 6 Clicks Score (PT): 16  AM-PAC 6 Clicks Score (OT): 19    Laura Muniz PTA  1/2/2020

## 2020-01-02 NOTE — PAYOR COMM NOTE
"Sascha John Sr. (59 y.o. Male)     Date of Birth Social Security Number Address Home Phone MRN    [id number]  [id number] [facility] [phone] [id number]    [employer] Mar[employer] Status          Congregational        Admission Date Admission Type Admitting Provider Attending Provider Department, Room/Bed    12/29/19 Emergency Del Park, Del Perez,  [facility] INTENSIVE CARE, I004/1    Discharge Date Discharge Disposition Discharge Destination                       Attending Provider:  Del Park DO    Allergies:  Codeine    Isolation:  Droplet   Infection:  Influenza (12/30/19)   Code Status:  CPR    Ht:  193 cm (75.98\")   Wt:  83.8 kg (184 lb 11.9 oz)    Admission Cmt:  None   Principal Problem:  None                Active Insurance as of 12/29/2019     Primary Coverage     Payor Plan Insurance Group Employer/Plan Group    [employer] [place] [employer] [place]ICA[place]      Payor Plan Address Payor Plan Phone Number Payor Plan Fax Number Effective Dates    [address] [address] [phone]  8/27/2016 - None Entered    [facility] [id number]       Subscriber Name Subscriber Birth Date Member ID       SASCHA JOHN SR. 1960 [id number]                 Emergency Contacts      (Rel.) Home Phone Work Phone Mobile Phone    SONIA VALLEJO (Sister) -- -- [phone]            Vital Signs (last day)     Date/Time   Temp   Temp src   Pulse   Resp   BP   Patient Position   SpO2    01/02/20 0800   --   --   67   --   91/62   --   94    01/02/20 0745   --   --   77   --   100/77   --   93    01/02/20 0730   --   --   82   15   105/77   --   90    01/02/20 0720   --   --   75   12   --   Lying   92    01/02/20 0715   --   --   76   --   95/81   --   93    01/02/20 0714   --   --   82   17   --   Lying   93    01/02/20 0700   --   --   79   --   101/67   --   93    01/02/20 0600   --   --   71   20   96/67   Lying   95    01/02/20 0500   --   --   89   17   114/85   Lying   92    " 01/02/20 0419   --   --   80   --   --   --   97    01/02/20 0400   98.3 (36.8)   Axillary   82   16   115/82   Lying   95    01/02/20 0300   --   --   85   21   94/75   Lying   93    01/02/20 0200   --   --   80   20   107/80   Lying   94    01/02/20 0100   --   --   87   19   109/77   Lying   95    01/02/20 0000   98.7 (37.1)   Axillary   80   22   102/69   Lying   96    01/01/20 2300   --   --   81   22   103/84   Lying   94    01/01/20 2248   --   --   89   --   --   --   95    01/01/20 2230   --   --   88   --   103/82   --   95    01/01/20 2200   --   --   86   25   102/80   Lying   95    01/01/20 2130   --   --   109   --   110/81   --   90    01/01/20 2100   --   --   112   19   106/78   Lying   91    01/01/20 2049   99.3 (37.4)   Axillary   120   20   --   Lying   90    01/01/20 2046   --   --   (!) 153   --   117/100   --   --    01/01/20 2031   --   --   (!) 160   --   113/83   Lying   96    01/01/20 1900   98.1 (36.7)   Oral   (!) 121   20   135/75   Lying   90    01/01/20 1740   --   --   100   18   --   --   92    01/01/20 1738   --   --   --   --   --   --   (!) 89    01/01/20 1735   --   --   --   --   --   --   (!) 89    01/01/20 1733   --   --   95   18   --   --   (!) 88    01/01/20 1629   96 (35.6)   Oral   (!) 127   18   138/87   Lying   (!) 89    01/01/20 1155   --   --   116   18   --   --   94    01/01/20 1149   --   --   112   18   --   --   93    01/01/20 1138   98.2 (36.8)   Oral   109   16   117/64   Lying   98    01/01/20 0803   99.1 (37.3)   Oral   101   18   122/87   Lying   90    01/01/20 0617   --   --   (!) 132   20   --   --   94    01/01/20 0614   --   --   119   18   --   --   (!) 88    01/01/20 0520   97 (36.1)   Oral   100   20   129/61   Lying   92    01/01/20 0114   98 (36.7)   Oral   100   18   123/78   Lying   91              Facility-Administered Medications as of 1/2/2020   Medication Dose Route Frequency Provider Last Rate Last Dose   • acetaminophen (TYLENOL) tablet 650  mg  650 mg Oral Q4H PRN Del Park DO        Or   • acetaminophen (TYLENOL) suppository 650 mg  650 mg Rectal Q4H PRN Del Park DO       • atorvastatin (LIPITOR) tablet 10 mg  10 mg Oral Daily Del Park DO   10 mg at 01/01/20 0956   • benzonatate (TESSALON) capsule 200 mg  200 mg Oral TID PRN Del Park DO       • clopidogrel (PLAVIX) tablet 75 mg  75 mg Oral Daily Del Park DO   75 mg at 01/01/20 0956   • diazePAM (VALIUM) tablet 2 mg  2 mg Oral Q8H Del Park DO   2 mg at 01/02/20 0559   • [COMPLETED] dilTIAZem (CARDIZEM) injection 20 mg  20 mg Intravenous Once Gustavo Lang MD   20 mg at 01/01/20 2046   • [COMPLETED] furosemide (LASIX) injection 40 mg  40 mg Intravenous Once Agatha Gottlieb APRN   40 mg at 01/01/20 1817   • furosemide (LASIX) injection 40 mg  40 mg Intravenous BID Del Park DO       • guaiFENesin (MUCINEX) 12 hr tablet 1,200 mg  1,200 mg Oral Q12H Del Park DO   1,200 mg at 01/01/20 2049   • HYDROcodone-acetaminophen (NORCO)  MG per tablet 1 tablet  1 tablet Oral Q6H PRN Del Park DO   1 tablet at 01/01/20 1004   • Influenza Vac Subunit Quad (FLUCELVAX) injection 0.5 mL  0.5 mL Intramuscular During Hospitalization Del Park DO       • ipratropium-albuterol (DUO-NEB) nebulizer solution 3 mL  3 mL Nebulization Q4H PRN Del Park DO       • ipratropium-albuterol (DUO-NEB) nebulizer solution 3 mL  3 mL Nebulization Q6H - RT Del Park DO       • levoFLOXacin (LEVAQUIN) tablet 750 mg  750 mg Oral Q24H Del Park DO       • LORazepam (ATIVAN) tablet 1 mg  1 mg Oral Q2H PRN Vivian, Del F, DO        Or   • LORazepam (ATIVAN) injection 1 mg  1 mg Intravenous Q2H PRN Del Park, DO   1 mg at 12/30/19 0435    Or   • LORazepam (ATIVAN) tablet 2 mg  2 mg Oral Q1H PRN Del Park, DO        Or   • LORazepam (ATIVAN) injection 2 mg  2 mg Intravenous Q1H PRN Del Park, DO        Or   •  LORazepam (ATIVAN) injection 2 mg  2 mg Intravenous Q15 Min PRN Del Park DO        Or   • LORazepam (ATIVAN) injection 2 mg  2 mg Intramuscular Q15 Min PRN Del Park DO        Or   • LORazepam (ATIVAN) tablet 4 mg  4 mg Oral Q1H PRN Del Park DO        Or   • LORazepam (ATIVAN) injection 4 mg  4 mg Intravenous Q1H PRN Del Park DO       • methocarbamol (ROBAXIN) tablet 500 mg  500 mg Oral Q8H Del Park DO   500 mg at 01/02/20 0559   • methylPREDNISolone sodium succinate (SOLU-Medrol) injection 40 mg  40 mg Intravenous Q6H Del Park DO       • [COMPLETED] metoprolol tartrate (LOPRESSOR) injection 5 mg  5 mg Intravenous Once Agatha Gottlieb APRN   5 mg at 01/01/20 1711   • metoprolol tartrate (LOPRESSOR) tablet 25 mg  25 mg Oral Q12H Del Park DO   25 mg at 01/01/20 2049   • [COMPLETED] morphine injection 4 mg  4 mg Intravenous Once Jero Salinas MD   4 mg at 12/29/19 2018   • multivitamin w/minerals tablet 1 tablet  1 tablet Oral Daily Del Park DO   1 tablet at 01/01/20 0956   • nicotine (NICODERM CQ) 21 MG/24HR patch 1 patch  1 patch Transdermal Daily Del Park DO   1 patch at 01/01/20 0956   • nitroglycerin (NITROSTAT) SL tablet 0.4 mg  0.4 mg Sublingual Q5 Min PRN Del Park DO       • [COMPLETED] ondansetron (ZOFRAN) injection 4 mg  4 mg Intravenous Once Jero Salinas MD   4 mg at 12/29/19 2019   • ondansetron (ZOFRAN) tablet 4 mg  4 mg Oral Q6H PRN Del Park DO        Or   • ondansetron (ZOFRAN) injection 4 mg  4 mg Intravenous Q6H PRN Del Park DO       • oseltamivir (TAMIFLU) capsule 75 mg  75 mg Oral Q12H Del Park DO   75 mg at 01/01/20 2050   • [COMPLETED] perflutren (DEFINITY) lipid microspheres injection 8.476 mg  1.3 mL Intravenous Once Del Park DO   8.476 mg at 12/30/19 1631   • [COMPLETED] potassium chloride (MICRO-K) CR capsule 40 mEq  40 mEq Oral Once Agatha Gottlieb, APRN   40  "mEq at 01/01/20 1540   • potassium chloride (MICRO-K) CR capsule 40 mEq  40 mEq Oral Once Del Park DO       • saccharomyces boulardii (FLORASTOR) capsule 250 mg  250 mg Oral BID Del Park DO   250 mg at 01/01/20 2049   • [COMPLETED] sodium chloride 0.9 % bolus 1,000 mL  1,000 mL Intravenous Once Jero Salinas MD   Stopped at 12/29/19 2311   • sodium chloride 0.9 % flush 10 mL  10 mL Intravenous Q12H Del Park DO   10 mL at 01/01/20 2049   • sodium chloride 0.9 % flush 10 mL  10 mL Intravenous PRN Del Park DO       • [COMPLETED] thiamine (B-1) 100 mg in sodium chloride 0.9 % 100 mL IVPB  100 mg Intravenous Once Gustavo Lang MD        Or   • [COMPLETED] thiamine (VITAMIN B-1) tablet 100 mg  100 mg Oral Once Gustavo Lang MD   100 mg at 12/30/19 0242   • thiamine (B-1) 100 mg in sodium chloride 0.9 % 100 mL IVPB  100 mg Intravenous Daily Del Park DO        Or   • thiamine (VITAMIN B-1) tablet 100 mg  100 mg Oral Daily Del Park DO   100 mg at 01/01/20 0955     Lab Results (last 24 hours)     Procedure Component Value Units Date/Time    Procalcitonin [[id number]]  (Normal) Collected:  01/02/20 0239    Specimen:  Blood Updated:  01/02/20 0821     Procalcitonin 0.15 ng/mL     Narrative:       As a Marker for Sepsis (Non-Neonates):   1. <0.5 ng/mL represents a low risk of severe sepsis and/or septic shock.  1. >2 ng/mL represents a high risk of severe sepsis and/or septic shock.    As a Marker for Lower Respiratory Tract Infections that require antibiotic therapy:  PCT on Admission     Antibiotic Therapy             6-12 Hrs later  > 0.5                Strongly Recommended            >0.25 - <0.5         Recommended  0.1 - 0.25           Discouraged                   Remeasure/reassess PCT  <0.1                 Strongly Discouraged          Remeasure/reassess PCT      As 28 day mortality risk marker: \"Change in Procalcitonin Result\" (> 80 % or <=80 %) if " Day 0 (or Day 1) and Day 4 values are available. Refer to http://www.[facility]-pct-calculator.com/   Change in PCT <=80 %   A decrease of PCT levels below or equal to 80 % defines a positive change in PCT test result representing a higher risk for 28-day all-cause mortality of patients diagnosed with severe sepsis or septic shock.  Change in PCT > 80 %   A decrease of PCT levels of more than 80 % defines a negative change in PCT result representing a lower risk for 28-day all-cause mortality of patients diagnosed with severe sepsis or septic shock.                  Phosphorus [[id number]]  (Normal) Collected:  01/02/20 0239    Specimen:  Blood Updated:  01/02/20 0807     Phosphorus 3.2 mg/dL     Magnesium [[id number]]  (Normal) Collected:  01/02/20 0239    Specimen:  Blood Updated:  01/02/20 0805     Magnesium 2.1 mg/dL     Basic Metabolic Panel [[id number]]  (Abnormal) Collected:  01/02/20 0239    Specimen:  Blood Updated:  01/02/20 0306     Glucose 93 mg/dL      BUN 10 mg/dL      Creatinine 0.78 mg/dL      Sodium 136 mmol/L      Potassium 3.5 mmol/L      Chloride 96 mmol/L      CO2 30.0 mmol/L      Calcium 8.1 mg/dL      eGFR Non African Amer 103 mL/min/1.73      BUN/Creatinine Ratio 12.8     Anion Gap 10.0 mmol/L     Narrative:       GFR Normal >60  Chronic Kidney Disease <60  Kidney Failure <15      CK [[id number]]  (Abnormal) Collected:  01/02/20 0239    Specimen:  Blood Updated:  01/02/20 0306     Creatine Kinase 1,059 U/L     CBC (No Diff) [[id number]]  (Abnormal) Collected:  01/02/20 0239    Specimen:  Blood Updated:  01/02/20 0252     WBC 4.60 10*3/mm3      RBC 4.77 10*6/mm3      Hemoglobin 15.1 g/dL      Hematocrit 42.6 %      MCV 89.3 fL      MCH 31.7 pg      MCHC 35.4 g/dL      RDW 12.6 %      RDW-SD 41.6 fl      MPV 9.9 fL      Platelets 88 10*3/mm3     Blood Gas, Arterial [[id number]]  (Abnormal) Collected:  01/01/20 1713    Specimen:  Arterial Blood Updated:  01/01/20 1723     Site Right Radial     Jr's  Test Positive     pH, Arterial 7.493 pH units      Comment: 83 Value above reference range        pCO2, Arterial 32.2 mm Hg      Comment: 84 Value below reference range        pO2, Arterial 39.1 mm Hg      Comment: 85 Value below critical limit        HCO3, Arterial 24.6 mmol/L      Base Excess, Arterial 2.1 mmol/L      Comment: 83 Value above reference range        O2 Saturation, Arterial 79.5 %      Comment: 84 Value below reference range        Temperature 37.0 C      Barometric Pressure for Blood Gas 747 mmHg      Modality Nasal Cannula     Flow Rate 6.0 lpm      Ventilator Mode NA     Notified [facility] ARIAN RN [id number]     Notified By [id number]     Notified Time 01/01/2020 17:27     Collected by [id number]     Comment: Meter: [id number]     :  [id number]       Magnesium [[id number]]  (Normal) Collected:  01/01/20 1318    Specimen:  Blood Updated:  01/01/20 1534     Magnesium 2.0 mg/dL     BNP [[id number]]  (Normal) Collected:  01/01/20 1318    Specimen:  Blood Updated:  01/01/20 1453     proBNP 779.9 pg/mL     Narrative:       Among patients with dyspnea, NT-proBNP is highly sensitive for the detection of acute congestive heart failure. In addition NT-proBNP of <300 pg/ml effectively rules out acute congestive heart failure with 99% negative predictive value.      Basic Metabolic Panel [[id number]]  (Abnormal) Collected:  01/01/20 1318    Specimen:  Blood Updated:  01/01/20 1337     Glucose 146 mg/dL      BUN 9 mg/dL      Creatinine 0.60 mg/dL      Sodium 130 mmol/L      Potassium 3.2 mmol/L      Chloride 93 mmol/L      CO2 26.0 mmol/L      Calcium 8.0 mg/dL      eGFR Non African Amer 139 mL/min/1.73      BUN/Creatinine Ratio 15.0     Anion Gap 11.0 mmol/L     Narrative:       GFR Normal >60  Chronic Kidney Disease <60  Kidney Failure <15      Respiratory Culture - Sputum, Cough [[id number]] Collected:  12/30/19 0810    Specimen:  Sputum from Cough Updated:  01/01/20 1306     Respiratory Culture Light growth (2+)  Normal Respiratory Zulma     Gram Stain Greater than 25 WBCs per low power field      Rare (1+) Epithelial cells per low power field      Rare (1+) Mixed gram positive zulma    Blood Culture With MATILDA - Blood, Arm, Right [[id number]] Collected:  12/30/19 1203    Specimen:  Blood from Arm, Right Updated:  01/01/20 1245     Blood Culture No growth at 2 days    Blood Culture With MATILDA - Blood, Arm, Left [[id number]] Collected:  12/30/19 1204    Specimen:  Blood from Arm, Left Updated:  01/01/20 1245     Blood Culture No growth at 2 days        Imaging Results (Last 24 Hours)     Procedure Component Value Units Date/Time    XR Chest 1 View [[id number]] Collected:  01/01/20 2043     Updated:  01/01/20 2047    Narrative:       XR CHEST 1 VW- 1/1/2020 8:30 PM CST     HISTORY: shortness of breath, hypoxia; T79.6XXA-Traumatic ischemia of  muscle, initial encounter; E87.8-Btcq-ekkdwvaisk and hyponatremia;  R13.10-Dysphagia, unspecified; Z74.09-Other reduced mobility;  Z74.09-Other reduced mobility       COMPARISON: 12/30/2019.     FINDINGS:   Diffuse airspace opacities are again noted and have not significantly  changed since 12/30/2019. The cardiomediastinal silhouette and pulmonary  vascularity are unchanged.      The osseous structures and surrounding soft tissues demonstrate no acute  abnormality.       Impression:       1. No significant change in diffuse airspace opacities that are more  prominent in the lower lungs. Findings remain concerning for pneumonia.        This report was finalized on 01/01/2020 20:44 by Dr. Beau Benjamin MD.        ECG/EMG Results (last 24 hours)     Procedure Component Value Units Date/Time    ECG 12 Lead [[id number]] Collected:  12/31/19 1145     Updated:  01/01/20 1109    Narrative:       Test Reason : syncope  Blood Pressure : **/** mmHG  Vent. Rate : 094 BPM     Atrial Rate : 094 BPM     P-R Int : 120 ms          QRS Dur : 082 ms      QT Int : 350 ms       P-R-T Axes : 046 013 011 degrees      QTc Int : 437 ms    Sinus rhythm with Premature supraventricular complexes  Nonspecific T wave abnormality  Abnormal ECG  When compared with ECG of 29-DEC-2019 19:51,  No significant change was found    Referred By:  ZIGGY           Confirmed By:CLEMENTINA Almanza MD    ECG 12 Lead [[id number]] Collected:  01/01/20 1649     Updated:  01/01/20 1651    Narrative:       Test Reason : afib  Blood Pressure : **/** mmHG  Vent. Rate : 172 BPM     Atrial Rate : 166 BPM     P-R Int : 000 ms          QRS Dur : 082 ms      QT Int : 262 ms       P-R-T Axes : 000 017 027 degrees     QTc Int : 443 ms    Atrial fibrillation with rapid ventricular response  Nonspecific ST and T wave abnormality  Abnormal ECG  When compared with ECG of 31-DEC-2019 11:45,  Atrial fibrillation has replaced Sinus rhythm  Vent. rate has increased BY  78 BPM    Referred By:             Confirmed By:     ECG 12 Lead [[id number]] Collected:  01/01/20 2029     Updated:  01/01/20 2030    Narrative:       Test Reason : tachycardia  Blood Pressure : **/** mmHG  Vent. Rate : 144 BPM     Atrial Rate : 150 BPM     P-R Int : 000 ms          QRS Dur : 082 ms      QT Int : 294 ms       P-R-T Axes : 000 021 029 degrees     QTc Int : 455 ms    Atrial fibrillation with rapid ventricular response with premature  ventricular or aberrantly conducted complexes  Abnormal ECG  When compared with ECG of 01-JAN-2020 16:49,  No significant change was found    Referred By:  ALESSANDRA           Confirmed By:           Orders (last 24 hrs)      Start     Ordered    01/03/20 0600  CBC (No Diff)  Morning Draw      01/02/20 0919    01/03/20 0600  Comprehensive Metabolic Panel  Morning Draw      01/02/20 0919    01/03/20 0600  Magnesium  Morning Draw      01/02/20 0919    01/03/20 0600  Phosphorus  Morning Draw      01/02/20 0919    01/02/20 1300  ipratropium-albuterol (DUO-NEB) nebulizer solution 3 mL  Every 6 Hours - RT      01/02/20 0833    01/02/20 1200  levoFLOXacin (LEVAQUIN) tablet  750 mg  Every 24 Hours      01/01/20 1627    01/02/20 1000  potassium chloride (MICRO-K) CR capsule 40 mEq  Once      01/02/20 0904    01/02/20 0930  furosemide (LASIX) injection 40 mg  2 Times Daily (Diuretics)      01/02/20 0832    01/02/20 0930  methylPREDNISolone sodium succinate (SOLU-Medrol) injection 40 mg  Every 6 Hours      01/02/20 0835    01/02/20 0752  Magnesium  Once      01/02/20 0751    01/02/20 0752  Phosphorus  Once      01/02/20 0751    01/02/20 0747  Procalcitonin  STAT      01/02/20 0746    01/02/20 0600  Basic Metabolic Panel  Morning Draw      01/01/20 1521    01/02/20 0600  CBC (No Diff)  Morning Draw      01/01/20 1840    01/02/20 0600  CK  Morning Draw      01/01/20 1845    01/02/20 0126  Oxygen Therapy- Heated High Flow Nasal Cannula; Titrate for SPO2: 90% - 95%  Continuous     Comments:  Vapotherm    01/02/20 0126    01/01/20 2130  dilTIAZem (CARDIZEM) 125 mg in sodium chloride 0.9 % 125 mL (1 mg/mL) infusion  Titrated,   Status:  Discontinued      01/01/20 2034 01/01/20 2130  dilTIAZem (CARDIZEM) injection 20 mg  Once      01/01/20 2034 01/01/20 2025  ECG 12 Lead  STAT      01/01/20 2024 01/01/20 2000  Strict Intake & Output  Every 4 Hours      01/01/20 1835    01/01/20 1845  furosemide (LASIX) injection 40 mg  Once      01/01/20 1746    01/01/20 1844  XR Chest 1 View  1 Time Imaging      01/01/20 1843    01/01/20 1836  Daily Weights  Daily      01/01/20 1835    01/01/20 1834  Transfer Patient  Once      01/01/20 1833    01/01/20 1747  metoprolol tartrate (LOPRESSOR) tablet 25 mg  Every 12 Hours Scheduled      01/01/20 1746    01/01/20 1745  metoprolol tartrate (LOPRESSOR) injection 5 mg  Once      01/01/20 1653    01/01/20 1729  Oxygen Therapy- Heated High Flow Nasal Cannula; Titrate for SPO2: 90% - 95%  Continuous,   Status:  Canceled      01/01/20 1730    01/01/20 1724  Blood Gas, Arterial  Once      01/01/20 1713    01/01/20 1707  Blood Gas, Arterial  STAT      01/01/20 1706     01/01/20 1639  ECG 12 Lead  STAT      01/01/20 1639    01/01/20 1630  potassium chloride (MICRO-K) CR capsule 40 mEq  Once      01/01/20 1521    01/01/20 1521  Magnesium  Once      01/01/20 1520    01/01/20 1425  BNP  Timed      01/01/20 1424    12/31/19 0900  predniSONE (DELTASONE) tablet 40 mg  Daily,   Status:  Discontinued      12/30/19 1124    12/31/19 0900  thiamine (B-1) 100 mg in sodium chloride 0.9 % 100 mL IVPB  Daily      12/30/19 1124    12/31/19 0900  thiamine (VITAMIN B-1) tablet 100 mg  Daily      12/30/19 1124    12/30/19 1400  diazePAM (VALIUM) tablet 2 mg  Every 8 Hours Scheduled      12/30/19 1124    12/30/19 1230  saccharomyces boulardii (FLORASTOR) capsule 250 mg  2 Times Daily      12/30/19 1135    12/30/19 1215  multivitamin w/minerals tablet 1 tablet  Daily      12/30/19 1124    12/30/19 0900  metoprolol tartrate (LOPRESSOR) tablet 12.5 mg  Every 12 Hours Scheduled,   Status:  Discontinued      12/30/19 0147    12/30/19 0900  atorvastatin (LIPITOR) tablet 10 mg  Daily      12/30/19 0147    12/30/19 0900  clopidogrel (PLAVIX) tablet 75 mg  Daily      12/30/19 0147    12/30/19 0730  ipratropium-albuterol (DUO-NEB) nebulizer solution 3 mL  3 Times Daily - RT,   Status:  Discontinued      12/30/19 0309    12/30/19 0600  methocarbamol (ROBAXIN) tablet 500 mg  Every 8 Hours Scheduled      12/30/19 0147    12/30/19 0309  ipratropium-albuterol (DUO-NEB) nebulizer solution 3 mL  Every 4 Hours PRN      12/30/19 0309    12/30/19 0245  sodium chloride 0.9 % flush 10 mL  Every 12 Hours Scheduled      12/30/19 0147    12/30/19 0245  sodium chloride 0.9 % infusion  Continuous,   Status:  Discontinued      12/30/19 0147    12/30/19 0245  levoFLOXacin (LEVAQUIN) 750 mg/150 mL D5W (premix) (LEVAQUIN) 750 mg  Daily,   Status:  Discontinued      12/30/19 0147 12/30/19 0245  oseltamivir (TAMIFLU) capsule 75 mg  Every 12 Hours Scheduled      12/30/19 0147 12/30/19 0245  guaiFENesin (MUCINEX) 12 hr tablet  1,200 mg  Every 12 Hours Scheduled      12/30/19 0147 12/30/19 0215  nicotine (NICODERM CQ) 21 MG/24HR patch 1 patch  Daily      12/30/19 0147 12/30/19 0146  Basic Metabolic Panel  Now Then Every 6 Hours,   Status:  Canceled      12/30/19 0147 12/30/19 0143  benzonatate (TESSALON) capsule 200 mg  3 Times Daily PRN      12/30/19 0147 12/30/19 0143  ondansetron (ZOFRAN) tablet 4 mg  Every 6 Hours PRN      12/30/19 0147 12/30/19 0143  ondansetron (ZOFRAN) injection 4 mg  Every 6 Hours PRN      12/30/19 0147 12/30/19 0143  acetaminophen (TYLENOL) tablet 650 mg  Every 4 Hours PRN      12/30/19 0147 12/30/19 0143  acetaminophen (TYLENOL) suppository 650 mg  Every 4 Hours PRN      12/30/19 0147 12/30/19 0141  LORazepam (ATIVAN) tablet 1 mg  Every 2 Hours PRN      12/30/19 0147 12/30/19 0141  LORazepam (ATIVAN) injection 1 mg  Every 2 Hours PRN      12/30/19 0147 12/30/19 0141  LORazepam (ATIVAN) tablet 2 mg  Every 1 Hour PRN      12/30/19 0147 12/30/19 0141  LORazepam (ATIVAN) injection 2 mg  Every 1 Hour PRN      12/30/19 0147 12/30/19 0141  LORazepam (ATIVAN) injection 2 mg  Every 15 Minutes PRN      12/30/19 0147 12/30/19 0141  LORazepam (ATIVAN) injection 2 mg  Every 15 Minutes PRN      12/30/19 0147 12/30/19 0141  LORazepam (ATIVAN) tablet 4 mg  Every 1 Hour PRN      12/30/19 0147 12/30/19 0141  LORazepam (ATIVAN) injection 4 mg  Every 1 Hour PRN      12/30/19 0147 12/30/19 0133  nitroglycerin (NITROSTAT) SL tablet 0.4 mg  Every 5 Minutes PRN      12/30/19 0147 12/30/19 0133  sodium chloride 0.9 % flush 10 mL  As Needed      12/30/19 0147    12/30/19 0132  HYDROcodone-acetaminophen (NORCO)  MG per tablet 1 tablet  Every 6 Hours PRN      12/30/19 0147    12/30/19 0056  Influenza Vac Subunit Quad (FLUCELVAX) injection 0.5 mL  During Hospitalization      12/30/19 0056    Unscheduled  ECG 12 Lead  As Needed     Comments:  Nurse to Release if Patient Expericences  Acute Chest Pain or Dysrhythmias    12/30/19 0147    --  SCANNED - TELEMETRY        12/29/19 0000    --  SCANNED EKG      12/29/19 0000    --  SCANNED - TELEMETRY        12/29/19 0000    --  SCANNED - TELEMETRY        12/29/19 0000    --  SCANNED - TELEMETRY        12/29/19 0000    --  SCANNED - TELEMETRY        12/29/19 0000    --  metoprolol tartrate (LOPRESSOR) 50 MG tablet  Daily      12/31/19 1003                   Physician Progress Notes (last 24 hours) (Notes from 01/01/20 0938 through 01/02/20 0938)      [place], DO at 01/02/20 0753              [facility] Medicine Services  INPATIENT PROGRESS NOTE    Patient Name: Sascha Oden Sr.  Date of Admission: 12/29/2019  Today's Date: 01/02/20  Length of Stay: 4  Primary Care Physician: Narendra Camara MD    Subjective   Chief Complaint: Follow-up respiratory failure    HPI   Patient seen and examined.  Was moved to a ICU last evening due to worsening respiratory status and several bouts of A. fib.  He was on high flow 40 L 80% last evening.  Now down to 40 L 35% today.  He is in sinus rhythm.  He says he feels better today than last night.  Says he urinated a time after the dose of Lasix.  Denies any chest pain or dizziness.  No nausea or vomiting.  Tolerating p.o.        Review of Systems   All pertinent negatives and positives are as above. All other systems have been reviewed and are negative unless otherwise stated.     Objective    Temp:  [96 °F (35.6 °C)-99.3 °F (37.4 °C)] 98.3 °F (36.8 °C)  Heart Rate:  [] 75  Resp:  [12-25] 12  BP: ()/() 96/67  Physical Exam   GEN: Awake, alert, interactive, in NAD.  Disheveled.  HEENT: PERRLA, EOMI, Anicteric, Trachea midline  Lungs: Tight bilaterally with rales and and expiratory wheeze  Heart: RRR, +S1/s2, no rub  ABD: soft, nt/nd, +BS, no guarding/rebound  Extremities: atraumatic, no cyanosis, no edema  Skin: no rashes or lesions  Neuro: AAOx3, no focal  deficits  Psych: normal mood & affect        Results Review:  I have reviewed the labs, radiology results, and diagnostic studies.    Laboratory Data:   Results from last 7 days   Lab Units 01/02/20  0239 12/31/19  0110 12/30/19  0254   WBC 10*3/mm3 4.60 5.80 4.90   HEMOGLOBIN g/dL 15.1 15.4 15.3   HEMATOCRIT % 42.6 42.6 41.7   PLATELETS 10*3/mm3 88* 89* 87*        Results from last 7 days   Lab Units 01/02/20  0239 01/01/20  1318 01/01/20  0713  12/30/19  0254 12/29/19  1957   SODIUM mmol/L 136 130* 131*   < > 120* 121*   POTASSIUM mmol/L 3.5 3.2* 2.9*   < > 3.5 3.7   CHLORIDE mmol/L 96* 93* 94*   < > 83* 80*   CO2 mmol/L 30.0* 26.0 26.0   < > 23.0 23.0   BUN mg/dL 10 9 9   < > 11 11   CREATININE mg/dL 0.78 0.60* 0.56*   < > 0.53* 0.70*   CALCIUM mg/dL 8.1* 8.0* 7.6*   < > 7.7* 8.2*   BILIRUBIN mg/dL  --   --   --   --  0.3 0.3   ALK PHOS U/L  --   --   --   --  50 57   ALT (SGPT) U/L  --   --   --   --  66* 75*   AST (SGOT) U/L  --   --   --   --  132* 146*   GLUCOSE mg/dL 93 146* 90   < > 111* 88    < > = values in this interval not displayed.       Culture Data:   Blood Culture   Date Value Ref Range Status   12/30/2019 No growth at 2 days  Preliminary   12/30/2019 No growth at 2 days  Preliminary     Respiratory Culture   Date Value Ref Range Status   12/30/2019 Light growth (2+) Normal Respiratory [place]  Final       Radiology Data:   Imaging Results (Last 24 Hours)     Procedure Component Value Units Date/Time    XR Chest 1 View [[id number]] Collected:  01/01/20 2043     Updated:  01/01/20 2047    Narrative:       XR CHEST 1 VW- 1/1/2020 8:30 PM CST     HISTORY: shortness of breath, hypoxia; T79.6XXA-Traumatic ischemia of  muscle, initial encounter; E87.2-Zfdj-kfoirbdvku and hyponatremia;  R13.10-Dysphagia, unspecified; Z74.09-Other reduced mobility;  Z74.09-Other reduced mobility       COMPARISON: 12/30/2019.     FINDINGS:   Diffuse airspace opacities are again noted and have not significantly  changed since  12/30/2019. The cardiomediastinal silhouette and pulmonary  vascularity are unchanged.      The osseous structures and surrounding soft tissues demonstrate no acute  abnormality.       Impression:       1. No significant change in diffuse airspace opacities that are more  prominent in the lower lungs. Findings remain concerning for pneumonia.        This report was finalized on 01/01/2020 20:44 by Dr. Beau Benjamin MD.          I have reviewed the patient's current medications.     Assessment/Plan     Active Hospital Problems    Diagnosis   • Acute respiratory failure with hypoxia (CMS/HCC)   • Paroxysmal atrial fibrillation with rapid ventricular response (CMS/HCC)   • Hypokalemia   • Influenza A   • Hyponatremia   • Alcoholism /alcohol abuse (CMS/HCC)   • COPD with acute exacerbation (CMS/HCC)   • CAP (community acquired pneumonia)   • Thrombocytopenia (CMS/HCC)   • Syncope and collapse   • Rhabdomyolysis   • Hypertension       #1 acute respiratory failure with hypoxia.  Had a PO2 of 39.1 on 6 L nasal cannula yesterday.  Suspect multifactorial from COPD, influenza, likely acute on chronic diastolic heart failure.  Initial imaging on arrival had mentioned x-ray with possible pneumonia.  However patient's never had a white count is worsened with fluids.  Mild BNP elevation.  Procalcitonin is 0.15.  Suspect he worsened yesterday in the setting of P A. fib and volume.  He is gotten slightly better overnight with diuretics.  We will continue diuresis at this time.  Okay to cover with Levaquin for short course.  Increase steroids today.  Currently on high flow 40 L 75%.  Wean O2 as able.  Nebulizers 4 times a day.    #2 syncope and collapse -prior to arrival.  Patient is an alcoholic and had been drinking.  He was hyponatremic on arrival.  CT head was nonacute.  2D echo showed diastolic dysfunction otherwise normal.  He had had no arrhythmias for several days until yesterday when he had 2 brief runs of A. fib RVR.   Potentially be ideology as well but less likely.  He has had no further syncope since arrival.    #3 COPD -with exacerbation.  Likely in the setting of influenza.  Sounds tight today.  We will go up on his steroids and nebulizers in the setting of worsening respiratory status.  Continue supportive care.    #4 hyponatremia  -on arrival.  Patient was slowly hydrated and was improving slowly day by day from 120 on arrival to 130 yesterday morning.  Fluids were stopped and after dose of diuretics he is now 136 this morning.      #5 influenza A  -continue Tamiflu and supportive care.  Continue droplet precautions.      #6 paroxysmal A. Fib -patient went A. fib RVR at 160 yesterday.  Was given 5 IV Lopressor and returned to sinus rhythm.  His oral Lopressor was increased from 12.5 twice daily 25 twice daily.  He had another brief run last night which converted with Cardizem.  Now sinus.  Likely increased stress from respiratory failure.    #7 ?CAP -on arrival chest x-ray was suspicious for pneumonia.  He had no white count or fever.  Did have the flu.  He has been on antibiotics since arrival and his procalcitonin is 0.15.  His x-ray has not improved and his respiratory status worsened despite antibiotics.  Suspect this may have been fluid all along.  We will continue short course antibiotics.    #8Rhabdomyolysis -in the setting of influenza and syncope/fall.  He denies any muscle aches or pains.  Was down from 1300 to 900 since arrival with IV fluids however he a had worsening respiratory status and appears to be in some volume overload.  Fluids now stopped diuresing.  Monitor closely.  Watch renal function.    #9 alcohol abuse -patient with history of alcohol abuse.  He has been on [facility] scores here and doing well with no signs of active withdrawal.  Continue multivitamin, thiamine, and folate.    #10 thrombocytopenia -likely the setting of alcohol abuse and bone marrow suppression.  Could also be low in the setting of  viral illness.  No signs of active bleeding.  No need for transfusion.  Monitor closely.    Approximately 45 minutes of critical care time were spent managing the patient exclusive of billable procedures.     Discharge Planning: Disposition ongoing.  Patient will need to work with PT. potential home with home with services versus SNF.  Likely 3+ days.    Del Park DO   01/02/20   7:53 AM      Electronically signed by Del Park DO at 01/02/20 0918     Del Park DO at 01/01/20 1516              [facility] Medicine Services  INPATIENT PROGRESS NOTE    Patient Name: Sascha Oden Sr.  Date of Admission: 12/29/2019  Today's Date: 01/01/20  Length of Stay: 3  Primary Care Physician: Narendra Camara MD    Subjective   Chief Complaint: Follow-up syncope and collapse  HPI   Patient was seen and examined on 2 different occasions.  During acute event of patient's atrial fibrillation with rapid ventricular response -he reported a cough but he could not tell that his heart was racing.  He denied shortness of breath, chest pain or pressure, and palpitations.  He was asking if I can assist him with his tray to set him up for dinner.    Review of Systems  All pertinent negatives and positives are as above. All other systems have been reviewed and are negative unless otherwise stated.     Objective    Temp:  [96 °F (35.6 °C)-99.1 °F (37.3 °C)] 96 °F (35.6 °C)  Heart Rate:  [] 100  Resp:  [16-20] 18  BP: (117-138)/(61-87) 138/87  Physical Exam  Constitutional: He is oriented to person, place, and time. He appears well-developed and well-nourished. No distress.   Unkept  HENT:   Head: Normocephalic and atraumatic.   Dry oral mucosa   Neck: Normal range of motion. Neck supple. No JVD present. No tracheal deviation present.   Cardiovascular: An irregularly irregular rhythm present. Intact distal pulses. Exam reveals no gallop and no friction rub.   Atrial fibrillation with a  rate of 160s to 200s.  After IV Lopressor was given at 1711 he has remained sinus 80s to 90s.  Pulmonary/Chest: Effort normal. He has no wheezes. He has rhonchi.    Diminished in bilateral bases with poor air entry  Abdominal: Soft. Bowel sounds are normal. He exhibits no distension. There is no tenderness. There is no guarding.   Genitourinary   Genitourinary comments: voiding per urinal  Musculoskeletal: Normal range of motion. He exhibits no edema or tenderness.   Neurological: He is alert and oriented to person, place, and time. No cranial nerve deficit.   Skin: Skin is warm and dry. No rash noted. No erythema.   Psychiatric: He has a normal mood and affect. His behavior is normal. Judgment and thought content normal.   Vitals reviewed.       Results Review:  I have reviewed the labs, radiology results, and diagnostic studies.    Laboratory Data:   Results from last 7 days   Lab Units 12/31/19  0110 12/30/19  0254 12/29/19 1957   WBC 10*3/mm3 5.80 4.90 6.30   HEMOGLOBIN g/dL 15.4 15.3 16.1   HEMATOCRIT % 42.6 41.7 44.6   PLATELETS 10*3/mm3 89* 87* 97*        Results from last 7 days   Lab Units 01/01/20  1318 01/01/20  0713 01/01/20  0211  12/30/19  0254 12/29/19 1957   SODIUM mmol/L 130* 131* 131*   < > 120* 121*   POTASSIUM mmol/L 3.2* 2.9* 3.3*   < > 3.5 3.7   CHLORIDE mmol/L 93* 94* 93*   < > 83* 80*   CO2 mmol/L 26.0 26.0 28.0   < > 23.0 23.0   BUN mg/dL 9 9 8   < > 11 11   CREATININE mg/dL 0.60* 0.56* 0.71*   < > 0.53* 0.70*   CALCIUM mg/dL 8.0* 7.6* 7.9*   < > 7.7* 8.2*   BILIRUBIN mg/dL  --   --   --   --  0.3 0.3   ALK PHOS U/L  --   --   --   --  50 57   ALT (SGPT) U/L  --   --   --   --  66* 75*   AST (SGOT) U/L  --   --   --   --  132* 146*   GLUCOSE mg/dL 146* 90 89   < > 111* 88    < > = values in this interval not displayed.     I have reviewed the patient's current medications.     Assessment/Plan     Active Hospital Problems    Diagnosis   • **Sepsis due to pneumonia (CMS/HCC)   • Paroxysmal  atrial fibrillation with rapid ventricular response (CMS/HCC)   • Hypokalemia   • Influenza A   • Hyponatremia   • Alcoholism /alcohol abuse (CMS/HCC)   • Syncope and collapse   • Diarrhea   • Cough   • COPD with acute exacerbation (CMS/HCC)   • CAP (community acquired pneumonia)   • Thrombocytopenia (CMS/[facility])   • Rhabdomyolysis   • Hypertension   • Tobacco abuse       Plan:  1.  At 1629, I was notified the patient was in A. fib RVR with a rate of 160s to 180s on telemetry, has high as low 200s.  Blood pressure 138/87, respiratory rate of 22, temperature of 96 °F . STAT EKG was obtained that revealed A. fib with RVR rate 172.  Stat ABG revealed PO2 of 39 with O2 saturation of 79.5.  He has been switched to heated high flow for goal of oxygen saturation of 90 to 95%.  We will give him a one-time dose of IV Lasix and increase his beta-blocker dosage.  He has been receiving 12.5 mg twice daily Lopressor, will increase to 25 mg.  At home he does take 50 mg daily.  After IV Lopressor was given at 1711 he has since converted to sinus rhythm with a rate of 80 to 90s with last blood pressure of 118/80.  Oxygen saturation of 92% on heated high flow 40 L and 80%.  -Patient tells me that he thinks he may have a history of an irregular heart rhythm.  His [id number] score is a 2 (history of hypertension and vascular disease).  May need to consider risk versus benefit of starting anticoagulation.  2.  Continue Levaquin for concern of bilateral pneumonia.  Duo nebs, Mucinex, and incentive spirometry.  Continue Tamiflu for influenza A..  3.  Continue oral prednisone.    4.  Blood cultures with no growth at 2 days.  5.  Continue CIWA protocol and monitor closely for signs or symptoms of withdrawal.  Oral vitamin replacement.  Continue scheduled oral Valium.  6.  Hyponatremia continues to slowly improve with a level of 130 today.  Likely related to alcohol abuse.  Continue gentle IV fluids.    7.  The patient is currently requiring  4 L per nasal cannula.  Wean supplemental oxygen as tolerated.  Patient does not normally wear oxygen at home.  8.  Creatinine kinase slowly improving with a level of 967.  9.  Plavix for history of peripheral arterial disease.  Monitor platelet count closely, thrombocytopenia likely related to acute illness along with alcohol abuse.   10.  Lovenox for VTE prophylaxis.  11.  Physical and Occupational Therapy.  Therapy recommends home health versus skilled nursing facility upon discharge.  12.  Labs in AM.    Discharge Planning: I expect the patient to be discharged to home in 2-4 days.    FORTINO Gandara   01/01/20   5:42 PM    I personally evaluated and examined the patient in conjunction with FORTINO Schilling and agree with the assessment, treatment plan, and disposition of the patient as recorded by her. My history, exam, and further recommendations are:     Patient seen and examined.  Appears to be worsening somewhat today.  Worsening shortness of breath.  Also had a event of A. fib RVR.    GEN: Awake, alert, interactive, in NAD  HEENT: Atraumatic, PERRLA, EOMI, Anicteric, Trachea midline  Lungs: Coarse sounds bilaterally, no wheezing  Heart: irreg/irreg, +S1/s2, no rub  ABD: soft, nt/nd, +BS, no guarding/rebound  Extremities: atraumatic, no cyanosis  Skin: no rashes or lesions  Neuro: AAOx3, no focal deficits  Psych: normal mood & affect    Patient with worsening respiratory failure.  Suspect possible fluid overload as we have been given him IV fluids for his rhabdo.  He does have diastolic dysfunction on his echo.  Sounds potentially wet.  DC IV fluids and start diuretics.  Stat chest x-ray.  Also of note he went A. fib RVR today was given 5 IV Lopressor with return to sinus rhythm.  Increase oral metoprolol dose.  Patient is now on 40 L 80% Vapotherm.  Will transfer to the ICU for closer monitoring and care.  Continue diuresis.    Del Park DO  01/01/20  6:42 PM      Electronically signed by Vivian  Del MCPHERSON DO at 01/01/20 1844       Consult Notes (last 24 hours) (Notes from 01/01/20 0938 through 01/02/20 0938)    No notes of this type exist for this encounter.

## 2020-01-02 NOTE — PROGRESS NOTES
[facility] Medicine Services  INPATIENT PROGRESS NOTE    Patient Name: Sascha Oden Sr.  Date of Admission: 12/29/2019  Today's Date: 01/02/20  Length of Stay: 4  Primary Care Physician: Narendra Camara MD    Subjective   Chief Complaint: Follow-up respiratory failure    HPI   Patient seen and examined.  Was moved to a ICU last evening due to worsening respiratory status and several bouts of A. fib.  He was on high flow 40 L 80% last evening.  Now down to 40 L 35% today.  He is in sinus rhythm.  He says he feels better today than last night.  Says he urinated a time after the dose of Lasix.  Denies any chest pain or dizziness.  No nausea or vomiting.  Tolerating p.o.        Review of Systems   All pertinent negatives and positives are as above. All other systems have been reviewed and are negative unless otherwise stated.     Objective    Temp:  [96 °F (35.6 °C)-99.3 °F (37.4 °C)] 98.3 °F (36.8 °C)  Heart Rate:  [] 75  Resp:  [12-25] 12  BP: ()/() 96/67  Physical Exam   GEN: Awake, alert, interactive, in NAD.  Disheveled.  HEENT: PERRLA, EOMI, Anicteric, Trachea midline  Lungs: Tight bilaterally with rales and and expiratory wheeze  Heart: RRR, +S1/s2, no rub  ABD: soft, nt/nd, +BS, no guarding/rebound  Extremities: atraumatic, no cyanosis, no edema  Skin: no rashes or lesions  Neuro: AAOx3, no focal deficits  Psych: normal mood & affect        Results Review:  I have reviewed the labs, radiology results, and diagnostic studies.    Laboratory Data:   Results from last 7 days   Lab Units 01/02/20  0239 12/31/19  0110 12/30/19  0254   WBC 10*3/mm3 4.60 5.80 4.90   HEMOGLOBIN g/dL 15.1 15.4 15.3   HEMATOCRIT % 42.6 42.6 41.7   PLATELETS 10*3/mm3 88* 89* 87*        Results from last 7 days   Lab Units 01/02/20  0239 01/01/20  1318 01/01/20  0713  12/30/19  0254 12/29/19  1957   SODIUM mmol/L 136 130* 131*   < > 120* 121*   POTASSIUM mmol/L 3.5 3.2* 2.9*   < > 3.5  3.7   CHLORIDE mmol/L 96* 93* 94*   < > 83* 80*   CO2 mmol/L 30.0* 26.0 26.0   < > 23.0 23.0   BUN mg/dL 10 9 9   < > 11 11   CREATININE mg/dL 0.78 0.60* 0.56*   < > 0.53* 0.70*   CALCIUM mg/dL 8.1* 8.0* 7.6*   < > 7.7* 8.2*   BILIRUBIN mg/dL  --   --   --   --  0.3 0.3   ALK PHOS U/L  --   --   --   --  50 57   ALT (SGPT) U/L  --   --   --   --  66* 75*   AST (SGOT) U/L  --   --   --   --  132* 146*   GLUCOSE mg/dL 93 146* 90   < > 111* 88    < > = values in this interval not displayed.       Culture Data:   Blood Culture   Date Value Ref Range Status   12/30/2019 No growth at 2 days  Preliminary   12/30/2019 No growth at 2 days  Preliminary     Respiratory Culture   Date Value Ref Range Status   12/30/2019 Light growth (2+) Normal Respiratory [place]  Final       Radiology Data:   Imaging Results (Last 24 Hours)     Procedure Component Value Units Date/Time    XR Chest 1 View [[id number]] Collected:  01/01/20 2043     Updated:  01/01/20 2047    Narrative:       XR CHEST 1 VW- 1/1/2020 8:30 PM CST     HISTORY: shortness of breath, hypoxia; T79.6XXA-Traumatic ischemia of  muscle, initial encounter; E87.4-Idsh-sqwwkpuknn and hyponatremia;  R13.10-Dysphagia, unspecified; Z74.09-Other reduced mobility;  Z74.09-Other reduced mobility       COMPARISON: 12/30/2019.     FINDINGS:   Diffuse airspace opacities are again noted and have not significantly  changed since 12/30/2019. The cardiomediastinal silhouette and pulmonary  vascularity are unchanged.      The osseous structures and surrounding soft tissues demonstrate no acute  abnormality.       Impression:       1. No significant change in diffuse airspace opacities that are more  prominent in the lower lungs. Findings remain concerning for pneumonia.        This report was finalized on 01/01/2020 20:44 by Dr. Beau Benjamin MD.          I have reviewed the patient's current medications.     Assessment/Plan     Active Hospital Problems    Diagnosis   • Acute respiratory  failure with hypoxia (CMS/[facility])   • Paroxysmal atrial fibrillation with rapid ventricular response (CMS/[facility])   • Hypokalemia   • Influenza A   • Hyponatremia   • Alcoholism /alcohol abuse (CMS/[facility])   • COPD with acute exacerbation (CMS/[facility])   • CAP (community acquired pneumonia)   • Thrombocytopenia (CMS/[facility])   • Syncope and collapse   • Rhabdomyolysis   • Hypertension       #1 acute respiratory failure with hypoxia.  Had a PO2 of 39.1 on 6 L nasal cannula yesterday.  Suspect multifactorial from COPD, influenza, likely acute on chronic diastolic heart failure.  Initial imaging on arrival had mentioned x-ray with possible pneumonia.  However patient's never had a white count is worsened with fluids.  Mild BNP elevation.  Procalcitonin is 0.15.  Suspect he worsened yesterday in the setting of P A. fib and volume.  He is gotten slightly better overnight with diuretics.  We will continue diuresis at this time.  Okay to cover with Levaquin for short course.  Increase steroids today.  Currently on high flow 40 L 75%.  Wean O2 as able.  Nebulizers 4 times a day.    #2 syncope and collapse -prior to arrival.  Patient is an alcoholic and had been drinking.  He was hyponatremic on arrival.  CT head was nonacute.  2D echo showed diastolic dysfunction otherwise normal.  He had had no arrhythmias for several days until yesterday when he had 2 brief runs of A. fib RVR.  Potentially be ideology as well but less likely.  He has had no further syncope since arrival.    #3 COPD -with exacerbation.  Likely in the setting of influenza.  Sounds tight today.  We will go up on his steroids and nebulizers in the setting of worsening respiratory status.  Continue supportive care.    #4 hyponatremia  -on arrival.  Patient was slowly hydrated and was improving slowly day by day from 120 on arrival to 130 yesterday morning.  Fluids were stopped and after dose of diuretics he is now 136 this morning.      #5 influenza A  -continue Tamiflu and  supportive care.  Continue droplet precautions.      #6 paroxysmal A. Fib -patient went A. fib RVR at 160 yesterday.  Was given 5 IV Lopressor and returned to sinus rhythm.  His oral Lopressor was increased from 12.5 twice daily 25 twice daily.  He had another brief run last night which converted with Cardizem.  Now sinus.  Likely increased stress from respiratory failure.    #7 ?CAP -on arrival chest x-ray was suspicious for pneumonia.  He had no white count or fever.  Did have the flu.  He has been on antibiotics since arrival and his procalcitonin is 0.15.  His x-ray has not improved and his respiratory status worsened despite antibiotics.  Suspect this may have been fluid all along.  We will continue short course antibiotics.    #8Rhabdomyolysis -in the setting of influenza and syncope/fall.  He denies any muscle aches or pains.  Was down from 1300 to 900 since arrival with IV fluids however he a had worsening respiratory status and appears to be in some volume overload.  Fluids now stopped diuresing.  Monitor closely.  Watch renal function.    #9 alcohol abuse -patient with history of alcohol abuse.  He has been on CIWA scores here and doing well with no signs of active withdrawal.  Continue multivitamin, thiamine, and folate.    #10 thrombocytopenia -likely the setting of alcohol abuse and bone marrow suppression.  Could also be low in the setting of viral illness.  No signs of active bleeding.  No need for transfusion.  Monitor closely.    Approximately 45 minutes of critical care time were spent managing the patient exclusive of billable procedures.     Discharge Planning: Disposition ongoing.  Patient will need to work with PT. potential home with home with services versus SNF.  Likely 3+ days.    Del Park DO   01/02/20   7:53 AM

## 2020-01-02 NOTE — PLAN OF CARE
Patient A&O X 4 . NSR 80-90's. Afebrile. VSS. UOP adequate. Weaned from vapotherm to Nasal canula at 5L.

## 2020-01-02 NOTE — PLAN OF CARE
Problem: Patient Care Overview  Goal: Plan of Care Review  Outcome: Ongoing (interventions implemented as appropriate)  Flowsheets (Taken 1/2/2020 0138)  Outcome Summary: Patient in [place], but agreeable to ot tx.  Patient came to eob with sba. Patient sat eob x 20 minutes with good balance. Patient completed exercises in bue hep with 1/2 # wt to increase patient's I with hep. Patient completed 10-20 reps in all planes to increase ue strength, rom, and act chirag for increased I with adl tasks. Patient requires frequent rb. Patient completed eob to supine with sba, rolling side <-> side with sba, and min/mod a to scoot self up in bed. Therapist recommends home with HH at time of d/c.

## 2020-01-02 NOTE — THERAPY TREATMENT NOTE
Acute Care - Occupational Therapy Treatment Note  [facility]     Patient Name: Sascha Oden Sr.  : 1960  MRN: [id number]  Today's Date: 2020  Onset of Illness/Injury or Date of Surgery: 19  Date of Referral to OT: 19  Referring Physician: Dr. Lang    Admit Date: 2019       ICD-10-CM ICD-9-CM   1. Traumatic rhabdomyolysis, initial encounter (CMS/[facility]) T79.6XXA 958.6   2. Hyponatremia E87.1 276.1   3. Dysphagia, unspecified type R13.10 787.20   4. Impaired functional mobility and activity tolerance Z74.09 V49.89   5. Impaired mobility and ADLs Z74.09 799.89     Patient Active Problem List   Diagnosis   • Pneumonia of both lower lobes due to infectious organism (CMS/[facility])   • Sinus tachycardia   • Tobacco abuse   • Emphysema of lung (CMS/[facility])   • Cough with hemoptysis   • Coffee ground emesis   • Paroxysmal SVT (supraventricular tachycardia) (CMS/[facility])   • Chest pain with low risk for cardiac etiology   • Hypercholesterolemia   • Hypertension   • Chronic back pain   • PAD (peripheral artery disease) (CMS/[facility])   • Rhabdomyolysis   • Hyponatremia   • Alcoholism /alcohol abuse (CMS/[facility])   • Syncope and collapse   • Diarrhea   • Cough   • COPD with acute exacerbation (CMS/[facility])   • CAP (community acquired pneumonia)   • Thrombocytopenia (CMS/[facility])   • Hypokalemia   • Influenza A   • Paroxysmal atrial fibrillation with rapid ventricular response (CMS/[facility])   • Acute respiratory failure with hypoxia (CMS/[facility])     Past Medical History:   Diagnosis Date   • Alcohol abuse    • Arthritis    • Blackout spell    • Chronic back pain     s/p fall    • Emphysema of lung (CMS/[facility])    • Hypercholesterolemia     Statin therapy    • Hypertension    • PAP (pulmonary alveolar proteinosis) (CMS/[facility])    • PVD (peripheral vascular disease) (CMS/[facility])     Followed by Dr. Juarez; ASA, Plavix, leg revascularization    • Tobacco abuse     1 PPD     Past Surgical History:   Procedure Laterality Date   • ANGIOPLASTY      • AORTAGRAM Right 2/11/2019    Procedure: RIGHT LOWER EXTREMITY ANGIOGRAM;  Surgeon: Jitendra Juarez DO;  Location: [facility] HYBRID OR 12;  Service: Vascular   • APPENDECTOMY     • BACK SURGERY     • CAROTID STENT Bilateral    • HAND SURGERY Right    • LEG REVASCULARIZATION Bilateral        Therapy Treatment    Rehabilitation Treatment Summary     Row Name 01/02/20 1354 01/02/20 1000          Treatment Time/Intention    Discipline  occupational therapy assistant  -AO  physical therapy assistant  -AE     Document Type  therapy note (daily note)  -AO  therapy note (daily note)  -AE     Subjective Information  complains of;pain;weakness  -AO  complains of;weakness  -AE     Mode of Treatment  occupational therapy  -AO  --     Patient/Family Observations  no family present  -AO  --     Patient Effort  good  -AO  --     Existing Precautions/Restrictions  fall;oxygen therapy device and L/min  -AO  fall;oxygen therapy device and L/min  -AE     Recorded by [AO] Jaja Pond COTA/L 01/02/20 1422 [AE] Laura Muniz, YULY 01/02/20 1034     Row Name 01/02/20 1000             Vital Signs    Pre SpO2 (%)  97  -AE      O2 Delivery Pre Treatment  -- vapotherm  -AE      Intra SpO2 (%)  89  -AE      O2 Delivery Intra Treatment  -- vapotherm  -AE      Post SpO2 (%)  89  -AE      O2 Delivery Post Treatment  -- vapotherm  -AE      Recorded by [AE] Laura Muniz, YULY 01/02/20 1034      Row Name 01/02/20 1354             Cognitive Assessment/Intervention- PT/OT    Affect/Mental Status (Cognitive)  WNL  -AO      Orientation Status (Cognition)  oriented x 4  -AO      Follows Commands (Cognition)  WNL  -AO      Safety Deficit (Cognitive)  mild deficit  -AO      Personal Safety Interventions  fall prevention program maintained;gait belt;muscle strengthening facilitated;nonskid shoes/slippers when out of bed  -AO      Recorded by [AO] Jaja Pond COTA/L 01/02/20 1429      Row Name 01/02/20 1359             Safety Issues,  Functional Mobility    Impairments Affecting Function (Mobility)  balance;endurance/activity tolerance;strength;shortness of breath  -AO      Recorded by [AO] TERELLFreddyJaja Azul COTA/L 01/02/20 1429      Row Name 01/02/20 1354 01/02/20 1000          Bed Mobility Assessment/Treatment    Bed Mobility Assessment/Treatment  rolling left;rolling right;scooting/bridging;supine-sit;sit-supine  -AO  --     Rolling Left Adrian (Bed Mobility)  supervision  -AO  --     Rolling Right Adrian (Bed Mobility)  supervision  -AO  --     Scooting/Bridging Adrian (Bed Mobility)  minimum assist (75% patient effort)  -AO  --     Supine-Sit Adrian (Bed Mobility)  verbal cues sba  -AO  contact guard;verbal cues  -AE     Sit-Supine Adrian (Bed Mobility)  verbal cues sba  -AO  --     Assistive Device (Bed Mobility)  bed rails;head of bed elevated  -AO  --     Recorded by [AO] Jaja Pond COTA/MIQUEL 01/02/20 1429 [AE] Laura Muniz, PTA 01/02/20 1034     Row Name 01/02/20 1000             Sit-Stand Transfer    Sit-Stand Adrian (Transfers)  minimum assist (75% patient effort);verbal cues  -AE      Recorded by [AE] Laura Munzi, PTA 01/02/20 1034      Row Name 01/02/20 1000             Gait/Stairs Assessment/Training    Adrian Level (Gait)  minimum assist (75% patient effort);verbal cues  -AE      Assistive Device (Gait)  walker, front-wheeled  -AE      Distance in Feet (Gait)  -- steps to chair then 5 steps forward and back x 2 reps  -AE      Deviations/Abnormal Patterns (Gait)  stride length decreased  -AE      Bilateral Gait Deviations  forward flexed posture  -AE      Recorded by [AE] Laura Muniz, PTA 01/02/20 1034      Row Name 01/02/20 1354             BADL Safety/Performance    Impairments, BADL Safety/Performance  balance;endurance/activity tolerance;strength;shortness of breath  -AO      Skilled BADL Treatment/Intervention  BADL process/adaptation training  -AO      Progress in BADL  Status  improvement noted  -AO      Recorded by [AO] Jaja Pond COTA/L 01/02/20 1429      Row Name 01/02/20 1354 01/02/20 1000          Therapeutic Exercise    Upper Extremity Range of Motion (Therapeutic Exercise)  shoulder flexion/extension, bilateral;shoulder abduction/adduction, bilateral;shoulder horizontal abduction/adduction, bilateral;elbow flexion/extension, bilateral;forearm supination/pronation, bilateral;wrist flexion/extension, bilateral  -AO  --     Lower Extremity (Therapeutic Exercise)  --  LAQ (long arc quad), bilateral  -AE     Lower Extremity Range of Motion (Therapeutic Exercise)  --  ankle dorsiflexion/plantar flexion, bilateral  -AE     Weight/Resistance (Therapeutic Exercise)  -- 1/2 #  -AO  --     Exercise Type (Therapeutic Exercise)  AROM (active range of motion);resistive exercises  -AO  AROM (active range of motion)  -AE     Position (Therapeutic Exercise)  seated eob  -AO  seated  -AE     Sets/Reps (Therapeutic Exercise)  -- 10-20  -AO  20  -AE     Recorded by [AO] Jaja Pond COTA/MIQUEL 01/02/20 1429 [AE] Laura Muniz, PTA 01/02/20 1034     Row Name 01/02/20 1354 01/02/20 1220 01/02/20 1000       Positioning and Restraints    Pre-Treatment Position  in bed  -AO  --  in bed  -AE    Post Treatment Position  chair  -AO  bed  -AO  chair  -AE    In Bed  fowlers;call light within reach;encouraged to call for assist  -AO  --  --    In Chair  --  --  sitting;call light within reach  -AE    Recorded by [AO] Jaja Pond COTA/L 01/02/20 1429 [AO] Jaja Pond BALDERAS/L 01/02/20 1429 [AE] Laura Muniz, PTA 01/02/20 1034    Row Name 01/02/20 1354             Pain Assessment    Additional Documentation  Pain Scale: Numbers Pre/Post-Treatment (Group)  -AO      Recorded by [AO] Jaja Pond COTA/L 01/02/20 1429      Row Name 01/02/20 1354 01/02/20 1000          Pain Scale: Numbers Pre/Post-Treatment    Pain Scale: Numbers, Pretreatment  0/10 - no pain  -AO  0/10 - no pain  -AE     Pain  Scale: Numbers, Post-Treatment  0/10 - no pain  -AO  0/10 - no pain  -AE     Recorded by [AO] TERELL'Jaja Azul BALDERAS/L 01/02/20 1429 [AE] Laura Muniz, PTA 01/02/20 1034     Row Name 01/02/20 1354             Plan of Care Review    Plan of Care Reviewed With  patient  -AO      Progress  improving  -AO      Recorded by [AO] TERELL'Jaja Azul BALDERAS/L 01/02/20 1429      Row Name 01/02/20 1354             Outcome Summary/Treatment Plan (OT)    Daily Summary of Progress (OT)  progress towards functional goals is fair  -AO      Anticipated Discharge Disposition (OT)  home with assist;home with home health  -AO      Recorded by [AO] TERELL'Jaja Azul BALDERAS/L 01/02/20 1429        User Key  (r) = Recorded By, (t) = Taken By, (c) = Cosigned By    Initials Name Effective Dates Discipline    AE Laura Muniz, PTA 06/22/15 -  PT    AO TERELL'Jaja Azul BALDERAS/L 10/01/19 -  OT                 OT Recommendation and Plan  Outcome Summary/Treatment Plan (OT)  Daily Summary of Progress (OT): progress towards functional goals is fair  Anticipated Discharge Disposition (OT): home with assist, home with home health  Daily Summary of Progress (OT): progress towards functional goals is fair  Plan of Care Review  Plan of Care Reviewed With: patient  Plan of Care Reviewed With: patient  Outcome Summary: Patient in Fowlers, but agreeable to ot tx.  Patient came to eob with sba. Patient sat eob x 20 minutes with good balance. Patient comleted exercises in bue hep with 1/2 # wt to increase patient's I with hep. Patient completed 10-20 reps in all planes to increase ue strength, rom, and act chirag for increased I with adl tasks. Patient requires frequent rb. Patient complete eob to supine with sba, rolling side <-> side with sba, and min/mod a to scoot self up in bed. Therapist recommends home with HH at time of d/c.  Outcome Measures     Row Name 01/02/20 1354 01/01/20 1426 12/31/19 [id number]       How much help from another person do you currently need...     Turning from your back to your side while in flat bed without using bedrails?  --  --  3  -AF    Moving from lying on back to sitting on the side of a flat bed without bedrails?  --  --  3  -AF    Moving to and from a bed to a chair (including a wheelchair)?  --  --  3  -AF    Standing up from a chair using your arms (e.g., wheelchair, bedside chair)?  --  --  3  -AF    Climbing 3-5 steps with a railing?  --  --  2  -AF    To walk in hospital room?  --  --  2  -AF    AM-PAC 6 Clicks Score (PT)  --  --  16  -AF       How much help from another is currently needed...    Putting on and taking off regular lower body clothing?  3  -AO  3  -MT  --    Bathing (including washing, rinsing, and drying)  3  -AO  3  -MT  --    Toileting (which includes using toilet bed pan or urinal)  3  -AO  3  -MT  --    Putting on and taking off regular upper body clothing  3  -AO  3  -MT  --    Taking care of personal grooming (such as brushing teeth)  3  -AO  3  -MT  --    Eating meals  4  -AO  4  -MT  --    AM-PAC 6 Clicks Score (OT)  19  -AO  19  -MT  --       Functional Assessment    Outcome Measure Options  --  --  AM-PAC 6 Clicks Basic Mobility (PT)  -AF      User Key  (r) = Recorded By, (t) = Taken By, (c) = Cosigned By    Initials Name Provider Type    MT Bobbi Martínez COTA/L Occupational Therapy Assistant    AF Stacey Conde PTA Physical Therapy Assistant    Jaja Almendarez COTA/L Occupational Therapy Assistant           Time Calculation:   Time Calculation- OT     Row Name 01/02/20 1430 01/02/20 1038          Time Calculation- OT    OT Start Time  1354  -AO  --     OT Stop Time  1432  -AO  --     OT Time Calculation (min)  38 min  -AO  --     Total Timed Code Minutes- OT  38 minute(s)  -AO  --     OT Received On  01/02/20  -AO  --        Timed Charges    49749 - Gait Training Minutes   --  15  -AE       User Key  (r) = Recorded By, (t) = Taken By, (c) = Cosigned By    Initials Name Provider Type    AE Laura Muniz  H, PTA Physical Therapy Assistant    AO O'RearJaja BALDERAS/L Occupational Therapy Assistant        Therapy Charges for Today     Code Description Service Date Service Provider Modifiers Qty    69145523361  OT THERAPEUTIC ACT EA 15 MIN 1/2/2020 O'RearJaja BALDERAS/L GO 1    [id number]  OT THER PROC EA 15 MIN 1/2/2020 O'RearJaja BALDERAS/L GO 2               Jaja O'Rear BALDERAS/L  1/2/2020

## 2020-01-02 NOTE — PLAN OF CARE
Problem: Patient Care Overview  Goal: Plan of Care Review  Flowsheets (Taken 1/2/2020 1034)  Progress: no change  Outcome Summary: PT tx completed. PT fowlers position in bed. Sats at 97% on vapotherm. Bed mobility CG. Sit<>stand Min. Pt first took steps to the chair then took several steps forward/backward with Rwx Alberto. O2 dropped to 89% only for short period. Arom BLE's x 20 reps. Recommend inpatient rehab.

## 2020-01-02 NOTE — PROGRESS NOTES
RT Nebulizer Protocol    Assessment tool to be used for patients with existing breathing treatments ordered by hospitalists                                                                  0  1  2  3  4      Respiratory History   No Smoking      Smoking History      1 Pack/Day      Pulmonary Disease      Exacerbation   x     Respiratory Rate   Normal      20-25   x   Dyspneic      Accessory Muscles      Severe Dyspnea        Breath Sounds   Clear    Crackles      Crackles/ Rhonchi   x   Wheezing      Absent/ Severe Wheezing        Chest   X-ray   Clear      1 Lobe Infiltration/ Consolidation/ PE      2 Lobe Same Lung Infiltration/ Consolidation/ PE       2 Lobe Infiltration/ Both Lungs/ Consolidation/ PE      Both Lungs/ More Than 1 Lobe/ Atelectasis/ Consolidation/  PE        Cough   Strong Non- Productive      Excessive Secretions/ Strong Cough      Excessive Secretions/ Weak Cough   x   Thick Bronchial Secretions/ Weak Cough      Thick Bronchial Secretions/ No Cough        Total Patient Score =  8  0-4=Q4 PRN  5-9=TID and Q4 PRN  10-14=QID and Q3 PRN  15-19=Q4 and Q2 PRN  20=Q3 and Q2 PRN    Bronchopulmonary Hygiene (CPT)   Q4 ATC Copious secretions, dyspnea, unable to sleep, mucus plug    QID & Q4 PRN Moderate secretions    TID Small amounts of secretions w/ poor cough and history of secretions    BID Unable to deep breathe and cough spontaneously       Lung Expansion Therapy (PEP)   Q4 & PRN at night Severe atelectasis, poor oxygenation    QID  High risk for persistent atelectasis, existence of same    TID At risk for developing atelectasis    BID Unable to deep breathe and cough spontaneously     Instruct, 1 follow up Patients able to perform well on their own    Patient changed to Q6 per MD

## 2020-01-03 LAB
ALBUMIN SERPL-MCNC: 3.1 G/DL (ref 3.5–5.2)
ALBUMIN/GLOB SERPL: 1 G/DL
ALP SERPL-CCNC: 55 U/L (ref 39–117)
ALT SERPL W P-5'-P-CCNC: 47 U/L (ref 1–41)
ANION GAP SERPL CALCULATED.3IONS-SCNC: 11 MMOL/L (ref 5–15)
AST SERPL-CCNC: 67 U/L (ref 1–40)
BILIRUB SERPL-MCNC: 0.2 MG/DL (ref 0.2–1.2)
BUN BLD-MCNC: 12 MG/DL (ref 6–20)
BUN/CREAT SERPL: 18.5 (ref 7–25)
CALCIUM SPEC-SCNC: 8.9 MG/DL (ref 8.6–10.5)
CHLORIDE SERPL-SCNC: 96 MMOL/L (ref 98–107)
CK SERPL-CCNC: 428 U/L (ref 20–200)
CO2 SERPL-SCNC: 29 MMOL/L (ref 22–29)
CREAT BLD-MCNC: 0.65 MG/DL (ref 0.76–1.27)
DEPRECATED RDW RBC AUTO: 40.1 FL (ref 37–54)
ERYTHROCYTE [DISTWIDTH] IN BLOOD BY AUTOMATED COUNT: 12.4 % (ref 12.3–15.4)
GFR SERPL CREATININE-BSD FRML MDRD: 127 ML/MIN/1.73
GLOBULIN UR ELPH-MCNC: 3.1 GM/DL
GLUCOSE BLD-MCNC: 169 MG/DL (ref 65–99)
HCT VFR BLD AUTO: 42.5 % (ref 37.5–51)
HGB BLD-MCNC: 15.2 G/DL (ref 13–17.7)
MAGNESIUM SERPL-MCNC: 2 MG/DL (ref 1.6–2.6)
MCH RBC QN AUTO: 31.5 PG (ref 26.6–33)
MCHC RBC AUTO-ENTMCNC: 35.8 G/DL (ref 31.5–35.7)
MCV RBC AUTO: 88.2 FL (ref 79–97)
PHOSPHATE SERPL-MCNC: 3.4 MG/DL (ref 2.5–4.5)
PLATELET # BLD AUTO: 112 10*3/MM3 (ref 140–450)
PMV BLD AUTO: 10.3 FL (ref 6–12)
POTASSIUM BLD-SCNC: 3.3 MMOL/L (ref 3.5–5.2)
POTASSIUM BLD-SCNC: 3.5 MMOL/L (ref 3.5–5.2)
PROT SERPL-MCNC: 6.2 G/DL (ref 6–8.5)
RBC # BLD AUTO: 4.82 10*6/MM3 (ref 4.14–5.8)
SODIUM BLD-SCNC: 136 MMOL/L (ref 136–145)
WBC NRBC COR # BLD: 3.93 10*3/MM3 (ref 3.4–10.8)

## 2020-01-03 PROCEDURE — 85027 COMPLETE CBC AUTOMATED: CPT | Performed by: INTERNAL MEDICINE

## 2020-01-03 PROCEDURE — 25010000002 METHYLPREDNISOLONE PER 40 MG: Performed by: INTERNAL MEDICINE

## 2020-01-03 PROCEDURE — 97116 GAIT TRAINING THERAPY: CPT

## 2020-01-03 PROCEDURE — 83735 ASSAY OF MAGNESIUM: CPT | Performed by: INTERNAL MEDICINE

## 2020-01-03 PROCEDURE — 82550 ASSAY OF CK (CPK): CPT | Performed by: INTERNAL MEDICINE

## 2020-01-03 PROCEDURE — 94760 N-INVAS EAR/PLS OXIMETRY 1: CPT

## 2020-01-03 PROCEDURE — 97110 THERAPEUTIC EXERCISES: CPT

## 2020-01-03 PROCEDURE — 94799 UNLISTED PULMONARY SVC/PX: CPT

## 2020-01-03 PROCEDURE — 84132 ASSAY OF SERUM POTASSIUM: CPT | Performed by: INTERNAL MEDICINE

## 2020-01-03 PROCEDURE — 84100 ASSAY OF PHOSPHORUS: CPT | Performed by: INTERNAL MEDICINE

## 2020-01-03 PROCEDURE — 97530 THERAPEUTIC ACTIVITIES: CPT

## 2020-01-03 PROCEDURE — 25010000002 FUROSEMIDE PER 20 MG: Performed by: INTERNAL MEDICINE

## 2020-01-03 PROCEDURE — 80053 COMPREHEN METABOLIC PANEL: CPT | Performed by: INTERNAL MEDICINE

## 2020-01-03 PROCEDURE — 25010000002 ENOXAPARIN PER 10 MG: Performed by: INTERNAL MEDICINE

## 2020-01-03 RX ORDER — IPRATROPIUM BROMIDE AND ALBUTEROL SULFATE 2.5; .5 MG/3ML; MG/3ML
3 SOLUTION RESPIRATORY (INHALATION)
Status: DISCONTINUED | OUTPATIENT
Start: 2020-01-03 | End: 2020-01-05

## 2020-01-03 RX ORDER — POTASSIUM CHLORIDE 750 MG/1
40 CAPSULE, EXTENDED RELEASE ORAL AS NEEDED
Status: DISCONTINUED | OUTPATIENT
Start: 2020-01-03 | End: 2020-01-08 | Stop reason: HOSPADM

## 2020-01-03 RX ORDER — METOPROLOL TARTRATE 50 MG/1
50 TABLET, FILM COATED ORAL EVERY 12 HOURS SCHEDULED
Status: DISCONTINUED | OUTPATIENT
Start: 2020-01-03 | End: 2020-01-08 | Stop reason: HOSPADM

## 2020-01-03 RX ORDER — POTASSIUM CHLORIDE 7.45 MG/ML
10 INJECTION INTRAVENOUS
Status: DISCONTINUED | OUTPATIENT
Start: 2020-01-03 | End: 2020-01-03

## 2020-01-03 RX ORDER — POTASSIUM CHLORIDE 1.5 G/1.77G
40 POWDER, FOR SOLUTION ORAL AS NEEDED
Status: DISCONTINUED | OUTPATIENT
Start: 2020-01-03 | End: 2020-01-08 | Stop reason: HOSPADM

## 2020-01-03 RX ADMIN — METHOCARBAMOL 500 MG: 500 TABLET ORAL at 13:38

## 2020-01-03 RX ADMIN — DIAZEPAM 2 MG: 2 TABLET ORAL at 21:14

## 2020-01-03 RX ADMIN — POTASSIUM CHLORIDE 40 MEQ: 750 CAPSULE, EXTENDED RELEASE ORAL at 08:48

## 2020-01-03 RX ADMIN — IPRATROPIUM BROMIDE AND ALBUTEROL SULFATE 3 ML: 2.5; .5 SOLUTION RESPIRATORY (INHALATION) at 02:04

## 2020-01-03 RX ADMIN — METHYLPREDNISOLONE SODIUM SUCCINATE 40 MG: 40 INJECTION, POWDER, FOR SOLUTION INTRAMUSCULAR; INTRAVENOUS at 03:42

## 2020-01-03 RX ADMIN — NICOTINE 1 PATCH: 21 PATCH, EXTENDED RELEASE TRANSDERMAL at 09:34

## 2020-01-03 RX ADMIN — METHOCARBAMOL 500 MG: 500 TABLET ORAL at 05:01

## 2020-01-03 RX ADMIN — Medication 250 MG: at 08:48

## 2020-01-03 RX ADMIN — METHYLPREDNISOLONE SODIUM SUCCINATE 40 MG: 40 INJECTION, POWDER, FOR SOLUTION INTRAMUSCULAR; INTRAVENOUS at 21:12

## 2020-01-03 RX ADMIN — Medication 250 MG: at 21:14

## 2020-01-03 RX ADMIN — IPRATROPIUM BROMIDE AND ALBUTEROL SULFATE 3 ML: 2.5; .5 SOLUTION RESPIRATORY (INHALATION) at 20:41

## 2020-01-03 RX ADMIN — METHYLPREDNISOLONE SODIUM SUCCINATE 40 MG: 40 INJECTION, POWDER, FOR SOLUTION INTRAMUSCULAR; INTRAVENOUS at 14:42

## 2020-01-03 RX ADMIN — OSELTAMIVIR PHOSPHATE 75 MG: 75 CAPSULE ORAL at 08:48

## 2020-01-03 RX ADMIN — Medication 1 TABLET: at 08:48

## 2020-01-03 RX ADMIN — GUAIFENESIN 1200 MG: 600 TABLET, EXTENDED RELEASE ORAL at 08:48

## 2020-01-03 RX ADMIN — DIAZEPAM 2 MG: 2 TABLET ORAL at 13:38

## 2020-01-03 RX ADMIN — FUROSEMIDE 40 MG: 10 INJECTION, SOLUTION INTRAVENOUS at 17:26

## 2020-01-03 RX ADMIN — DIAZEPAM 2 MG: 2 TABLET ORAL at 05:01

## 2020-01-03 RX ADMIN — POTASSIUM CHLORIDE 40 MEQ: 750 CAPSULE, EXTENDED RELEASE ORAL at 12:41

## 2020-01-03 RX ADMIN — GUAIFENESIN 1200 MG: 600 TABLET, EXTENDED RELEASE ORAL at 21:14

## 2020-01-03 RX ADMIN — SODIUM CHLORIDE, PRESERVATIVE FREE 10 ML: 5 INJECTION INTRAVENOUS at 21:13

## 2020-01-03 RX ADMIN — FUROSEMIDE 40 MG: 10 INJECTION, SOLUTION INTRAVENOUS at 08:49

## 2020-01-03 RX ADMIN — ENOXAPARIN SODIUM 80 MG: 80 INJECTION SUBCUTANEOUS at 09:34

## 2020-01-03 RX ADMIN — Medication 100 MG: at 08:48

## 2020-01-03 RX ADMIN — METOPROLOL TARTRATE 50 MG: 50 TABLET, FILM COATED ORAL at 08:48

## 2020-01-03 RX ADMIN — LEVOFLOXACIN 750 MG: 750 TABLET, FILM COATED ORAL at 12:41

## 2020-01-03 RX ADMIN — METOPROLOL TARTRATE 50 MG: 50 TABLET, FILM COATED ORAL at 21:14

## 2020-01-03 RX ADMIN — IPRATROPIUM BROMIDE AND ALBUTEROL SULFATE 3 ML: 2.5; .5 SOLUTION RESPIRATORY (INHALATION) at 13:57

## 2020-01-03 RX ADMIN — METHOCARBAMOL 500 MG: 500 TABLET ORAL at 21:14

## 2020-01-03 RX ADMIN — CLOPIDOGREL BISULFATE 75 MG: 75 TABLET, FILM COATED ORAL at 08:48

## 2020-01-03 RX ADMIN — METHYLPREDNISOLONE SODIUM SUCCINATE 40 MG: 40 INJECTION, POWDER, FOR SOLUTION INTRAMUSCULAR; INTRAVENOUS at 08:49

## 2020-01-03 RX ADMIN — ATORVASTATIN CALCIUM 10 MG: 10 TABLET, FILM COATED ORAL at 08:48

## 2020-01-03 RX ADMIN — IPRATROPIUM BROMIDE AND ALBUTEROL SULFATE 3 ML: 2.5; .5 SOLUTION RESPIRATORY (INHALATION) at 06:58

## 2020-01-03 RX ADMIN — POTASSIUM CHLORIDE 40 MEQ: 750 CAPSULE, EXTENDED RELEASE ORAL at 23:50

## 2020-01-03 RX ADMIN — POTASSIUM CHLORIDE 40 MEQ: 750 CAPSULE, EXTENDED RELEASE ORAL at 19:10

## 2020-01-03 RX ADMIN — ENOXAPARIN SODIUM 80 MG: 80 INJECTION SUBCUTANEOUS at 21:13

## 2020-01-03 NOTE — THERAPY TREATMENT NOTE
Acute Care - Occupational Therapy Treatment Note  [facility]     Patient Name: Sascha Oden Sr.  : 1960  MRN: [id number]  Today's Date: 1/3/2020  Onset of Illness/Injury or Date of Surgery: 19  Date of Referral to OT: 19  Referring Physician: Dr. Lang    Admit Date: 2019       ICD-10-CM ICD-9-CM   1. Traumatic rhabdomyolysis, initial encounter (CMS/Formerly [facility]) T79.6XXA 958.6   2. Hyponatremia E87.1 276.1   3. Dysphagia, unspecified type R13.10 787.20   4. Impaired functional mobility and activity tolerance Z74.09 V49.89   5. Impaired mobility and ADLs Z74.09 799.89     Patient Active Problem List   Diagnosis   • Pneumonia of both lower lobes due to infectious organism (CMS/Formerly [facility])   • Sinus tachycardia   • Tobacco abuse   • Emphysema of lung (CMS/Formerly [facility])   • Cough with hemoptysis   • Coffee ground emesis   • Paroxysmal SVT (supraventricular tachycardia) (CMS/Formerly [facility])   • Chest pain with low risk for cardiac etiology   • Hypercholesterolemia   • Hypertension   • Chronic back pain   • PAD (peripheral artery disease) (CMS/Formerly [facility])   • Rhabdomyolysis   • Hyponatremia   • Alcoholism /alcohol abuse (CMS/Formerly [facility])   • Syncope and collapse   • Diarrhea   • Cough   • COPD with acute exacerbation (CMS/Formerly [facility])   • CAP (community acquired pneumonia)   • Thrombocytopenia (CMS/Formerly [facility])   • Hypokalemia   • Influenza A   • Paroxysmal atrial fibrillation with rapid ventricular response (CMS/Formerly [facility])   • Acute respiratory failure with hypoxia (CMS/Formerly [facility])     Past Medical History:   Diagnosis Date   • Alcohol abuse    • Arthritis    • Blackout spell    • Chronic back pain     s/p fall    • Emphysema of lung (CMS/Formerly [facility])    • Hypercholesterolemia     Statin therapy    • Hypertension    • PAP (pulmonary alveolar proteinosis) (CMS/Formerly [facility])    • PVD (peripheral vascular disease) (CMS/Formerly [facility])     Followed by Dr. Juarez; ASA, Plavix, leg revascularization    • Tobacco abuse     1 PPD     Past Surgical History:   Procedure Laterality Date   • ANGIOPLASTY      • AORTAGRAM Right 2/11/2019    Procedure: RIGHT LOWER EXTREMITY ANGIOGRAM;  Surgeon: Jitendra Juarez DO;  Location: [facility] HYBRID OR 12;  Service: Vascular   • APPENDECTOMY     • BACK SURGERY     • CAROTID STENT Bilateral    • HAND SURGERY Right    • LEG REVASCULARIZATION Bilateral        Therapy Treatment    Rehabilitation Treatment Summary     Row Name 01/03/20 1453 01/03/20 1435 01/03/20 0906       Treatment Time/Intention    Discipline  physical therapy assistant  -MF  occupational therapy assistant  -MT  physical therapy assistant  -    Document Type  --  therapy note (daily note)  -MT  therapy note (daily note)  -2    Subjective Information  --  complains of;weakness;fatigue;dyspnea  -MT  complains of;weakness  -2    Mode of Treatment  --  individual therapy;occupational therapy  -MT  physical therapy  -MF2    Patient/Family Observations  --  no family present   -MT  --    Comment  pt working with OT at this time.   -MF  --  --    Reason Treatment Not Performed  unavailable for treatment  -  --  --    Existing Precautions/Restrictions  --  fall;oxygen therapy device and L/min  -MT  fall;oxygen therapy device and L/min  -MF2    Treatment Considerations/Comments  --  Flu A   -MT  Flu A  -2    Recorded by [] Urvashi Silva, PTA 01/03/20 1453 [MT] Bobbi Martínez COTA/L 01/03/20 1510 [MF] Urvashi Silva, PTA 01/03/20 0906  [MF2] Urvashi Silva, PTA 01/03/20 0939    Row Name 01/03/20 1435 01/03/20 0906          Vital Signs    Pretreatment Heart Rate (beats/min)  --  112  -MF     Intratreatment Heart Rate (beats/min)  --  -- 120s-130s  -MF     Posttreatment Heart Rate (beats/min)  --  98  -MF     Pre SpO2 (%)  89  -MT  89  -MF     O2 Delivery Pre Treatment  supplemental O2 10L  -MT  -- 15L high flow  -     Intra SpO2 (%)  86  -MT  87  -MF     O2 Delivery Intra Treatment  supplemental O2 12L  -MT  supplemental O2 15L   -MF     Post SpO2 (%)  90  -MT  90  -MF     O2 Delivery Post  Treatment  supplemental O2 10L after rest break  -MT  supplemental O2 15L  -MF     Pre Patient Position  Sitting  -MT  Sitting  -MF     Intra Patient Position  Standing  -MT  Standing  -MF     Post Patient Position  Sitting  -MT  Sitting  -MF     Recorded by [MT] Bobbi Martínez COTA/L 01/03/20 1510 [MF] Urvashi Silva, PTA 01/03/20 0939     Row Name 01/03/20 1435             Cognitive Assessment/Intervention- PT/OT    Personal Safety Interventions  fall prevention program maintained;nonskid shoes/slippers when out of bed;supervised activity  -MT      Recorded by [MT] Bobbi Martínez COTA/L 01/03/20 1510      Row Name 01/03/20 1435             Safety Issues, Functional Mobility    Impairments Affecting Function (Mobility)  balance;endurance/activity tolerance;strength;shortness of breath  -MT      Recorded by [MT] Bobbi Martínez COTA/L 01/03/20 1510      Row Name 01/03/20 1435 01/03/20 0906          Bed Mobility Assessment/Treatment    Supine-Sit [place] (Bed Mobility)  supervision;verbal cues  -MT  supervision  -MF     Sit-Supine [place] (Bed Mobility)  verbal cues;supervision SBA  -MT  --     Assistive Device (Bed Mobility)  head of bed elevated;bed rails  -MT  head of bed elevated;bed rails  -MF     Recorded by [MT] Bobbi Martínez COTA/L 01/03/20 1510 [MF] Urvashi Silva, PTA 01/03/20 0939     Row Name 01/03/20 1435             Functional Mobility    Functional Mobility- Ind. Level  verbal cues required;contact guard assist HHA  -MT      Functional Mobility- Device  rolling walker  -MT      Functional Mobility- Comment  a few steps to stand on scale for updated pt weight   -MT      Recorded by [MT] Bobbi Martínez COTA/L 01/03/20 1510      Row Name 01/03/20 1435             Transfer Assessment/Treatment    Transfer Assessment/Treatment  sit-stand transfer;stand-sit transfer  -MT      Comment (Transfers)  2x EOB   -MT      Recorded by [MT] Bobbi Martínez COTA/L 01/03/20 1510      Row  Name 01/03/20 1435 01/03/20 0906          Sit-Stand Transfer    Sit-Stand [place] (Transfers)  verbal cues;contact guard HHA  -MT  contact guard  -MF     Assistive Device (Sit-Stand Transfers)  -- HHA  -MT  --     Recorded by [MT] Bobbi Martínez COTA/L 01/03/20 1510 [MF] Urvashi Silva, PTA 01/03/20 0939     Row Name 01/03/20 1435 01/03/20 0906          Stand-Sit Transfer    Stand-Sit [place] (Transfers)  verbal cues;contact guard  -MT  contact guard  -MF     Assistive Device (Stand-Sit Transfers)  -- HHA  -MT  --     Recorded by [MT] Bobbi Martínez COTA/L 01/03/20 1510 [MF] Urvashi Silva, PTA 01/03/20 0939     Row Name 01/03/20 0906             Gait/Stairs Assessment/Training    [place] Level (Gait)  verbal cues;contact guard  -MF      Assistive Device (Gait)  walker, front-wheeled  -      Distance in Feet (Gait)  10  -MF      Deviations/Abnormal Patterns (Gait)  stride length decreased;jomar decreased  -MF      Bilateral Gait Deviations  forward flexed posture  -MF      Recorded by [MF] Urvashi Silva, PTA 01/03/20 0939      Row Name 01/03/20 1435 01/03/20 0906          Therapeutic Exercise    Lower Extremity (Therapeutic Exercise)  --  LAQ (long arc quad), bilateral  -MF     Lower Extremity Range of Motion (Therapeutic Exercise)  --  hip flexion/extension, bilateral;ankle dorsiflexion/plantar flexion, bilateral  -MF     Exercise Type (Therapeutic Exercise)  --  AROM (active range of motion)  -MF     Position (Therapeutic Exercise)  --  seated  -MF     Sets/Reps (Therapeutic Exercise)  --  20 15 reps hip flexion  -MF     Comment (Therapeutic Exercise)  BUE ther ex 2# weight x3 planes 15 reps with mod rest breaks in between d/t fatigue and O2 decreased to 86 on 12 L   -MT  --     Recorded by [MT] Bobbi Martínez COTA/L 01/03/20 1510 [MF] Urvashi Silva, PTA 01/03/20 0939     Row Name 01/03/20 1435 01/03/20 0906          Positioning and Restraints    Pre-Treatment  Position  in bed  -MT  in bed  -MF     Post Treatment Position  bed  -MT  chair  -MF     In Bed  sitting;call light within reach;encouraged to call for assist;side rails up x2  -MT  --     In Chair  --  notified nsg;sitting;call light within reach;encouraged to call for assist;with nsg  -MF     Recorded by [MT] Bobbi Martínez COTA/L 01/03/20 1510 [MF] Urvashi Silva, PTA 01/03/20 0939     Row Name 01/03/20 1435 01/03/20 0906          Pain Scale: Numbers Pre/Post-Treatment    Pain Scale: Numbers, Pretreatment  0/10 - no pain  -MT  0/10 - no pain  -MF     Pain Scale: Numbers, Post-Treatment  0/10 - no pain  -MT  0/10 - no pain  -MF     Recorded by [MT] Bobbi Martínez COTA/L 01/03/20 1510 [MF] Urvashi Silva, PTA 01/03/20 0939     Row Name 01/03/20 1435             Plan of Care Review    Plan of Care Reviewed With  patient  -MT      Progress  no change  -MT      Recorded by [MT] Bobbi Martínez COTA/L 01/03/20 1513      Row Name 01/03/20 1435             Outcome Summary/Treatment Plan (OT)    Daily Summary of Progress (OT)  progress towards functional goals is fair  -MT      Barriers to Overall Progress (OT)  weakness, O2 and SOA  -MT      Recorded by [MT] Bobbi Martínez COTA/MIQUEL 01/03/20 1510        User Key  (r) = Recorded By, (t) = Taken By, (c) = Cosigned By    Initials Name Effective Dates Discipline     Urvashi Silva, PTA 08/02/16 -  PT    MT Bobbi Martínez COTA/L 11/29/19 -  OT                 OT Recommendation and Plan  Outcome Summary/Treatment Plan (OT)  Daily Summary of Progress (OT): progress towards functional goals is fair  Barriers to Overall Progress (OT): weakness, O2 and SOA  Anticipated Discharge Disposition (OT): home with home health, skilled nursing facility  Daily Summary of Progress (OT): progress towards functional goals is fair  Plan of Care Review  Plan of Care Reviewed With: patient  Plan of Care Reviewed With: patient  Outcome Summary: Pt in bed:S with VCs for bed  mobility. Stood 2x EOB with CGA HHA t/f to standing scale 2x. Able to stand for 1 min each time with increased SOA and fatigue. Pt on 12L for activity. Sat EOB to perform BUE ther ex 2# wand x15 reps 3 planes with mod rest breaks d/t fatigue and SOA. Pt O2 90% end of tx on 10L after 2 min rest break. Continue OT POC. Recommend SNF for rehab at d/c.  Outcome Measures     Row Name 01/03/20 1435 01/02/20 1354 01/01/20 1426       How much help from another is currently needed...    Putting on and taking off regular lower body clothing?  2  -MT  3  -AO  3  -MT    Bathing (including washing, rinsing, and drying)  2  -MT  3  -AO  3  -MT    Toileting (which includes using toilet bed pan or urinal)  2  -MT  3  -AO  3  -MT    Putting on and taking off regular upper body clothing  3  -MT  3  -AO  3  -MT    Taking care of personal grooming (such as brushing teeth)  3  -MT  3  -AO  3  -MT    Eating meals  4  -MT  4  -AO  4  -MT    AM-PAC 6 Clicks Score (OT)  16  -MT  19  -AO  19  -MT      User Key  (r) = Recorded By, (t) = Taken By, (c) = Cosigned By    Initials Name Provider Type    Bobbi Calvo COTA/L Occupational Therapy Assistant    AO Jaja Pond COTA/L Occupational Therapy Assistant           Time Calculation:   Time Calculation- OT     Row Name 01/03/20 1435 01/03/20 0941          Time Calculation- OT    OT Start Time  1435  -MT  --     OT Stop Time  1500  -MT  --     OT Time Calculation (min)  25 min  -MT  --     Total Timed Code Minutes- OT  25 minute(s)  -MT  --     OT Received On  01/03/20  -MT  --        Timed Charges    09902 - OT Therapeutic Exercise Minutes  15  -MT  --     58668 - Gait Training Minutes   --  13  -MF     22826 - OT Therapeutic Activity Minutes  10  -MT  --       User Key  (r) = Recorded By, (t) = Taken By, (c) = Cosigned By    Initials Name Provider Type    Urvashi Neal PTA Physical Therapy Assistant    Bobbi Calvo COTA/L Occupational Therapy Assistant        Therapy  Charges for Today     Code Description Service Date Service Provider Modifiers Qty    09653044576  OT THER PROC EA 15 MIN 1/3/2020 MauricioBobbi mejia, BALDERAS/L GO 1    14151282482  OT THERAPEUTIC ACT EA 15 MIN 1/3/2020 MauricioBobbi mejia BALDERAS/L GO 1               Bobbi Mauricio, BALDERAS/L  1/3/2020

## 2020-01-03 NOTE — THERAPY TREATMENT NOTE
Acute Care - Physical Therapy Treatment Note  [facility]     Patient Name: Sascha Oden Sr.  : 1960  MRN: [id number]  Today's Date: 1/3/2020  Onset of Illness/Injury or Date of Surgery: 19     Referring Physician: Dr. Lang    Admit Date: 2019    Visit Dx:    ICD-10-CM ICD-9-CM   1. Traumatic rhabdomyolysis, initial encounter (CMS/[facility]) T79.6XXA 958.6   2. Hyponatremia E87.1 276.1   3. Dysphagia, unspecified type R13.10 787.20   4. Impaired functional mobility and activity tolerance Z74.09 V49.89   5. Impaired mobility and ADLs Z74.09 799.89     Patient Active Problem List   Diagnosis   • Pneumonia of both lower lobes due to infectious organism (CMS/[facility])   • Sinus tachycardia   • Tobacco abuse   • Emphysema of lung (CMS/[facility])   • Cough with hemoptysis   • Coffee ground emesis   • Paroxysmal SVT (supraventricular tachycardia) (CMS/[facility])   • Chest pain with low risk for cardiac etiology   • Hypercholesterolemia   • Hypertension   • Chronic back pain   • PAD (peripheral artery disease) (CMS/[facility])   • Rhabdomyolysis   • Hyponatremia   • Alcoholism /alcohol abuse (CMS/[facility])   • Syncope and collapse   • Diarrhea   • Cough   • COPD with acute exacerbation (CMS/[facility])   • CAP (community acquired pneumonia)   • Thrombocytopenia (CMS/[facility])   • Hypokalemia   • Influenza A   • Paroxysmal atrial fibrillation with rapid ventricular response (CMS/[facility])   • Acute respiratory failure with hypoxia (CMS/[facility])       Therapy Treatment    Rehabilitation Treatment Summary     Row Name 20 0906             Treatment Time/Intention    Discipline  physical therapy assistant  -      Document Type  therapy note (daily note)  -MF2      Subjective Information  complains of;weakness  -2      Mode of Treatment  physical therapy  -2      Existing Precautions/Restrictions  fall;oxygen therapy device and L/min  -2      Treatment Considerations/Comments  Flu A  -MF2      Recorded by [] Urvashi Silva PTA 20  0906  [MF2] Urvashi Silva, PTA 01/03/20 0939      Row Name 01/03/20 0906             Vital Signs    Pretreatment Heart Rate (beats/min)  112  -MF      Intratreatment Heart Rate (beats/min)  -- 120s-130s  -MF      Posttreatment Heart Rate (beats/min)  98  -MF      Pre SpO2 (%)  89  -MF      O2 Delivery Pre Treatment  -- 15L high flow  -MF      Intra SpO2 (%)  87  -MF      O2 Delivery Intra Treatment  supplemental O2 15L   -MF      Post SpO2 (%)  90  -MF      O2 Delivery Post Treatment  supplemental O2 15L  -MF      Pre Patient Position  Sitting  -MF      Intra Patient Position  Standing  -MF      Post Patient Position  Sitting  -MF      Recorded by [MF] Urvashi Silva, PTA 01/03/20 0939      Row Name 01/03/20 0906             Bed Mobility Assessment/Treatment    Supine-Sit [place] (Bed Mobility)  supervision  -      Assistive Device (Bed Mobility)  head of bed elevated;bed rails  -MF      Recorded by [MF] Urvashi Silva, PTA 01/03/20 0939      Row Name 01/03/20 0906             Sit-Stand Transfer    Sit-Stand [place] (Transfers)  contact guard  -      Recorded by [MF] Urvashi Silva, PTA 01/03/20 0939      Row Name 01/03/20 0906             Stand-Sit Transfer    Stand-Sit [place] (Transfers)  contact guard  -MF      Recorded by [MF] Urvashi Silva, PTA 01/03/20 0939      Row Name 01/03/20 0906             Gait/Stairs Assessment/Training    [place] Level (Gait)  verbal cues;contact guard  -      Assistive Device (Gait)  walker, front-wheeled  -      Distance in Feet (Gait)  10  -MF      Deviations/Abnormal Patterns (Gait)  stride length decreased;jomar decreased  -      Bilateral Gait Deviations  forward flexed posture  -MF      Recorded by [MF] Urvashi Silva, PTA 01/03/20 0939      Row Name 01/03/20 0906             Therapeutic Exercise    Lower Extremity (Therapeutic Exercise)  LAQ (long arc quad), bilateral  -MF      Lower Extremity Range of Motion  (Therapeutic Exercise)  hip flexion/extension, bilateral;ankle dorsiflexion/plantar flexion, bilateral  -      Exercise Type (Therapeutic Exercise)  AROM (active range of motion)  -      Position (Therapeutic Exercise)  seated  -      Sets/Reps (Therapeutic Exercise)  20 15 reps hip flexion  -      Recorded by [] Urvashi Silva, PTA 01/03/20 0939      Row Name 01/03/20 0906             Positioning and Restraints    Pre-Treatment Position  in bed  -      Post Treatment Position  chair  -MF      In Chair  notified nsg;sitting;call light within reach;encouraged to call for assist;with nsg  -MF      Recorded by [] Urvashi Silva, PTA 01/03/20 0939      Row Name 01/03/20 0906             Pain Scale: Numbers Pre/Post-Treatment    Pain Scale: Numbers, Pretreatment  0/10 - no pain  -      Pain Scale: Numbers, Post-Treatment  0/10 - no pain  -      Recorded by [] Urvashi Silva, [facility] [place] 01/03/20 0939        User Key  (r) = Recorded By, (t) = Taken By, (c) = Cosigned By    Initials Name Effective Dates Discipline     Urvashi Silva, PTA 08/02/16 -  PT                       PT Recommendation and Plan     Plan of Care Reviewed With: patient  Progress: improving  Outcome Summary: Pt. agreeable to therapy. He was Superivison for supine to sit. He participated with leg exercises, but required rest breaks due to weakkness and SOA. Pt. then was able to walk 10' in room with RWX. His o2 sats dropped to 87% on 15L during activity. Will continue to progress activity as tolerated.  Outcome Measures     Row Name 01/02/20 1354 01/01/20 1426 12/31/19 1455       How much help from another person do you currently need...    Turning from your back to your side while in flat bed without using bedrails?  --  --  3  -AF    Moving from lying on back to sitting on the side of a flat bed without bedrails?  --  --  3  -AF    Moving to and from a bed to a chair (including a wheelchair)?  --  --  3  -AF     Standing up from a chair using your arms (e.g., wheelchair, bedside chair)?  --  --  3  -AF    Climbing 3-5 steps with a railing?  --  --  2  -AF    To walk in hospital room?  --  --  2  -AF    AM-PAC 6 Clicks Score (PT)  --  --  16  -AF       How much help from another is currently needed...    Putting on and taking off regular lower body clothing?  3  -AO  3  -MT  --    Bathing (including washing, rinsing, and drying)  3  -AO  3  -MT  --    Toileting (which includes using toilet bed pan or urinal)  3  -AO  3  -MT  --    Putting on and taking off regular upper body clothing  3  -AO  3  -MT  --    Taking care of personal grooming (such as brushing teeth)  3  -AO  3  -MT  --    Eating meals  4  -AO  4  -MT  --    AM-PAC 6 Clicks Score (OT)  19  -AO  19  -MT  --       Functional Assessment    Outcome Measure Options  --  --  AM-PAC 6 Clicks Basic Mobility (PT)  -AF      User Key  (r) = Recorded By, (t) = Taken By, (c) = Cosigned By    Initials Name Provider Type    MT Bobbi Martínez COTA/L Occupational Therapy Assistant    AF Stacey Conde PTA Physical Therapy Assistant    AO Jaja Pond COTA/L Occupational Therapy Assistant         Time Calculation:   PT Charges     Row Name 01/03/20 0941             Time Calculation    Start Time  0906  -      Stop Time  0939  -      Time Calculation (min)  33 min  -      PT Received On  01/03/20  -      PT Goal Re-Cert Due Date  01/09/20  -         Time Calculation- PT    Total Timed Code Minutes- PT  33 minute(s)  -         Timed Charges    79735 - PT Therapeutic Exercise Minutes  20  -MF      60952 - Gait Training Minutes   13  -MF        User Key  (r) = Recorded By, (t) = Taken By, (c) = Cosigned By    Initials Name Provider Type     Urvashi Silva PTA Physical Therapy Assistant        Therapy Charges for Today     Code Description Service Date Service Provider Modifiers Qty    34828066969 HC PT THER PROC EA 15 MIN 1/3/2020 Urvashi Sivla PTA  GP 1    22695477615 HC GAIT TRAINING EA 15 MIN 1/3/2020 Urvashi Silva, PTA GP 1          PT G-Codes  Outcome Measure Options: AM-PAC 6 Clicks Basic Mobility (PT)  AM-PAC 6 Clicks Score (PT): 16  AM-PAC 6 Clicks Score (OT): 19    Urvashi Silva PTA  1/3/2020

## 2020-01-03 NOTE — PROGRESS NOTES
RT Nebulizer Protocol    Assessment tool to be used for patients with existing breathing treatments ordered by hospitalists                                                                  0  1  2  3  4      Respiratory History   No Smoking    Smoking History      1 Pack/Day      Pulmonary Disease      Exacerbation   x     Respiratory Rate   Normal      20-25   x   Dyspneic      Accessory Muscles      Severe Dyspnea        Breath Sounds   Clear      Crackles      Crackles/ Rhonchi   x   Wheezing      Absent/ Severe Wheezing        Chest   X-ray   Clear      1 Lobe Infiltration/ Consolidation/ PE      2 Lobe Same Lung Infiltration/ Consolidation/ PE       2 Lobe Infiltration/ Both Lungs/ Consolidation/ PE      Both Lungs/ More Than 1 Lobe/ Atelectasis/ Consolidation/  PE        Cough   Strong Non- Productive   x   Excessive Secretions/ Strong Cough      Excessive Secretions/ Weak Cough      Thick Bronchial Secretions/ Weak Cough    Thick Bronchial Secretions/ No Cough        Total Patient Score =  7  0-4=Q4 PRN  5-9=TID and Q4 PRN  10-14=QID and Q3 PRN  15-19=Q4 and Q2 PRN  20=Q3 and Q2 PRN    Bronchopulmonary Hygiene (CPT)   Q4 ATC Copious secretions, dyspnea, unable to sleep, mucus plug    QID & Q4 PRN Moderate secretions    TID Small amounts of secretions w/ poor cough and history of secretions    BID Unable to deep breathe and cough spontaneously       Lung Expansion Therapy (PEP)   Q4 & PRN at night Severe atelectasis, poor oxygenation    QID  High risk for persistent atelectasis, existence of same    TID At risk for developing atelectasis    BID Unable to deep breathe and cough spontaneously     Instruct, 1 follow up Patients able to perform well on their own      Per protocol changed to Tid and q4prn.

## 2020-01-03 NOTE — PROGRESS NOTES
[facility] Medicine Services  INPATIENT PROGRESS NOTE    Patient Name: Sascha Oden Sr.  Date of Admission: 12/29/2019  Today's Date: 01/03/20  Length of Stay: 5  Primary Care Physician: Narendra Camara MD    Subjective   Chief Complaint: Follow-up respiratory failure    HPI   Patient seen and examined.  Feeling a little better today.  Breathing little easier.  Denies active chest pain.  No abdominal pain or nausea.  Tolerating p.o.  Having frequent PACs on monitor and potential occasional brief runs of A. fib as well.  Overall he definitely remains primarily in sinus rhythm.        Review of Systems   All pertinent negatives and positives are as above. All other systems have been reviewed and are negative unless otherwise stated.     Objective    Temp:  [97.6 °F (36.4 °C)-98.9 °F (37.2 °C)] 97.6 °F (36.4 °C)  Heart Rate:  [] 99  Resp:  [10-21] 16  BP: ()/(62-94) 133/89  Physical Exam   GEN: Awake, alert, interactive, in NAD.  Disheveled.  HEENT: PERRLA, EOMI, Anicteric, Trachea midline  Lungs: Improved air entry bilaterally today.  Still some fine rales.  No wheezing  Heart: RRR, +S1/s2, no rub  ABD: soft, nt/nd, +BS, no guarding/rebound  Extremities: atraumatic, no cyanosis, no peripheral edema  Skin: no rashes or lesions  Neuro: AAOx3, no focal deficits  Psych: normal mood & affect        Results Review:  I have reviewed the labs, radiology results, and diagnostic studies.    Laboratory Data:   Results from last 7 days   Lab Units 01/03/20  0351 01/02/20  0239 12/31/19  0110   WBC 10*3/mm3 3.93 4.60 5.80   HEMOGLOBIN g/dL 15.2 15.1 15.4   HEMATOCRIT % 42.5 42.6 42.6   PLATELETS 10*3/mm3 112* 88* 89*        Results from last 7 days   Lab Units 01/03/20  0351 01/02/20  0239 01/01/20  1318  12/30/19  0254 12/29/19  1957   SODIUM mmol/L 136 136 130*   < > 120* 121*   POTASSIUM mmol/L 3.3* 3.5 3.2*   < > 3.5 3.7   CHLORIDE mmol/L 96* 96* 93*   < > 83* 80*   CO2 mmol/L  29.0 30.0* 26.0   < > 23.0 23.0   BUN mg/dL 12 10 9   < > 11 11   CREATININE mg/dL 0.65* 0.78 0.60*   < > 0.53* 0.70*   CALCIUM mg/dL 8.9 8.1* 8.0*   < > 7.7* 8.2*   BILIRUBIN mg/dL 0.2  --   --   --  0.3 0.3   ALK PHOS U/L 55  --   --   --  50 57   ALT (SGPT) U/L 47*  --   --   --  66* 75*   AST (SGOT) U/L 67*  --   --   --  132* 146*   GLUCOSE mg/dL 169* 93 146*   < > 111* 88    < > = values in this interval not displayed.       Culture Data:   Blood Culture   Date Value Ref Range Status   12/30/2019 No growth at 2 days  Preliminary   12/30/2019 No growth at 2 days  Preliminary     Respiratory Culture   Date Value Ref Range Status   12/30/2019 Light growth (2+) Normal Respiratory [place]  Final       Radiology Data:   Imaging Results (Last 24 Hours)     ** No results found for the last 24 hours. **          I have reviewed the patient's current medications.     Assessment/Plan     Active Hospital Problems    Diagnosis   • Acute respiratory failure with hypoxia (CMS/HCC)   • Paroxysmal atrial fibrillation with rapid ventricular response (CMS/HCC)   • Hypokalemia   • Influenza A   • Hyponatremia   • Alcoholism /alcohol abuse (CMS/HCC)   • COPD with acute exacerbation (CMS/HCC)   • CAP (community acquired pneumonia)   • Thrombocytopenia (CMS/[facility])   • Syncope and collapse   • Rhabdomyolysis   • Hypertension       #1 acute respiratory failure with hypoxia.  Had a PO2 of 39.1 on 6 L nasal cannula 1/1.  Suspect multifactorial from COPD, influenza, likely acute on chronic diastolic heart failure.  Initial imaging on arrival had mentioned x-ray with possible pneumonia.  However patient's never had a white count is worsened with fluids.  Mild BNP elevation.  Procalcitonin is 0.15.  Suspect he worsened in the setting of P A. fib and volume.  He has gotten better over the last 48 hours with diuretics and steroids, we will continue these measures at this time.  Okay to cover with Levaquin for short course.  Currently on high  flow nasal cannula 12 L.  Wean O2 as able.  Nebulizers 4 times a day.    #2 syncope and collapse -prior to arrival.  Patient is an alcoholic and had been drinking.  He was hyponatremic on arrival.  CT head was nonacute.  2D echo showed diastolic dysfunction otherwise normal.  He had had no arrhythmias for several days until he began having runs of A. fib RVR on 1/1.  Could potentially be etiology as well but less likely.  He has had no further syncope since arrival.    #3 COPD -with exacerbation.  Likely in the setting of influenza.  Steroid dosing increased 1/2 with improvement in aeration.  COPD little better.  Continue current dose.  Continue nebulizers and supportive care.    #4 hyponatremia  -on arrival.  Patient was slowly hydrated and was improving slowly day by day from 120 on arrival to 130.  Fluids were stopped and after diuretics he is now 136    #5 influenza A  -continue Tamiflu and supportive care.  Continue droplet precautions.      #6 paroxysmal A. Fib - patient went A. fib RVR at 160 on 1/1.  Was given 5 IV Lopressor and returned to sinus rhythm.  His oral Lopressor was increased from 12.5 twice daily 25 twice daily.  He had another brief run after arrival to the ICU with Cardizem.  Has been primarily sinus since with frequent PACs.  Some questionable short runs of A. fib.  Patient states he has had A. fib in the past but I cannot find in the records.  He is not noted to be on a blood thinner prior to hospitalization. ? due to concerns for fall risk and alcoholism.  He denies any life-threatening bleeds or brain bleeds.  We will start Lovenox 1 mg/kg twice a day today for now and decide neurologic regulation prior to discharge.  Increase Lopressor to 50 twice daily.    #7 ?CAP -on arrival chest x-ray was suspicious for pneumonia.  He had no white count or fever.  Did have the flu.  He has been on antibiotics since arrival and his procalcitonin is 0.15.  His x-ray has not improved and his respiratory  status worsened despite antibiotics.  Suspect this may have been fluid all along.  We will continue antibiotics however and treat for 7 days total.    #8 Rhabdomyolysis -in the setting of influenza and syncope/fall.  He denies any muscle aches or pains.  Was down from 1300 to 900 since arrival with IV fluids however he a had worsening respiratory status and appeared to be in some volume overload.  Fluids were stopped and diuretics started.  CK continues to improve and now down to 428.  Renal function stable.    #9 alcohol abuse -patient with history of alcohol abuse.  He has been on CIWA scores here and doing well with no signs of active withdrawal.  Continue multivitamin, thiamine, and folate.    #10 thrombocytopenia -likely in the setting of alcohol abuse and bone marrow suppression.  Could also have been in the setting of viral illness/influenza. No signs of active bleeding.  No need for transfusion.  Counts now up to 120,000.  Plan to start Lovenox today.  Monitor closely.    #11 hypokalemia -was replaced yesterday with started diuretics and still low today.  replacement protocols have been added.  Recheck as needed.      Discharge Planning: Disposition ongoing.  Patient will need to work with PT. potential home with home with services versus SNF.  Likely 3+ days.  Remain in unit for now given high O2 needs.  Potentially transfer within the next 24 hours if further improvement/weaning      Approximately 40 minutes of critical care time were spent managing the patient exclusive of billable procedures.       [place] DO   01/03/20   7:51 AM

## 2020-01-03 NOTE — PLAN OF CARE
Problem: Patient Care Overview  Goal: Plan of Care Review  Outcome: Ongoing (interventions implemented as appropriate)  Flowsheets (Taken 1/3/2020 3674)  Outcome Summary: Pt in bed:S with VCs for bed mobility. Stood 2x EOB with CGA HHA t/f to standing scale 2x. Able to stand for 1 min each time with increased SOA and fatigue. Pt on 12L for activity. Sat EOB to perform BUE ther ex 2# wand x15 reps 3 planes with mod rest breaks: performed for increased activity tolerance for tasks. Pt O2 90% end of tx on 10L after 2 min rest break. Continue OT POC. Recommend SNF for rehab at d/c.

## 2020-01-03 NOTE — PLAN OF CARE
Problem: Patient Care Overview  Goal: Plan of Care Review  Outcome: Ongoing (interventions implemented as appropriate)  Flowsheets (Taken 1/3/2020 3642)  Progress: improving  Plan of Care Reviewed With: patient  Outcome Summary: Pt reports appetite fair at this time. Cardiac diet, oral intake avg 67% of past 3 meals.  Advise of alternate menu selections, available snacks. Encouraged oral intake. Adding Boost Plus BID.

## 2020-01-03 NOTE — PLAN OF CARE
Pt O2 increased to 12L hi flow nasal cannula. Oriented x 4. Ouput good. Continued steroids. Increased appetite.

## 2020-01-03 NOTE — PLAN OF CARE
Pt is A&O x4. HR . BP 120s. On 10L NC. O2 sat 90-94%. UOP adequate. Coccyx red and blanchable. Potassium replacement protocol initiated.

## 2020-01-03 NOTE — PLAN OF CARE
Problem: Patient Care Overview  Goal: Plan of Care Review  Outcome: Ongoing (interventions implemented as appropriate)  Flowsheets (Taken 1/3/2020 4867)  Progress: improving  Plan of Care Reviewed With: patient  Outcome Summary: Pt. agreeable to therapy. He was Superivison for supine to sit. He participated with leg exercises, but required rest breaks due to weakkness and SOA. Pt. then was able to walk 10' in room with RWX. His o2 sats dropped to 87% on 15L during activity. Will continue to progress activity as tolerated.

## 2020-01-03 NOTE — PROGRESS NOTES
Continued Stay Note   [place]     Patient Name: Sascha Oden Sr.  MRN: [id number]  Today's Date: 1/3/2020    Admit Date: 12/29/2019    Discharge Plan     Row Name 01/03/20 1011       Plan    Plan  LTAC vs SNF vs acute rehab    Plan Comments  Patient admitted from home.  Patient is now in ICU on 12L high flow O2.  At this time, therapy has recommended rehab placement.  If O2 demands remain high patient may qualify for LTAC, however, his insurance would require a precert.  SW will follow patient's progress to determine appropriate level of care upon discharge.        Discharge Codes    No documentation.             PAT Diop

## 2020-01-04 ENCOUNTER — APPOINTMENT (OUTPATIENT)
Dept: GENERAL RADIOLOGY | Facility: HOSPITAL | Age: 60
End: 2020-01-04

## 2020-01-04 LAB
ALBUMIN SERPL-MCNC: 3.2 G/DL (ref 3.5–5.2)
ALBUMIN/GLOB SERPL: 1 G/DL
ALP SERPL-CCNC: 52 U/L (ref 39–117)
ALT SERPL W P-5'-P-CCNC: 54 U/L (ref 1–41)
ANION GAP SERPL CALCULATED.3IONS-SCNC: 12 MMOL/L (ref 5–15)
AST SERPL-CCNC: 55 U/L (ref 1–40)
BACTERIA SPEC AEROBE CULT: NORMAL
BACTERIA SPEC AEROBE CULT: NORMAL
BILIRUB SERPL-MCNC: 0.3 MG/DL (ref 0.2–1.2)
BUN BLD-MCNC: 15 MG/DL (ref 6–20)
BUN/CREAT SERPL: 25 (ref 7–25)
CALCIUM SPEC-SCNC: 9.4 MG/DL (ref 8.6–10.5)
CHLORIDE SERPL-SCNC: 97 MMOL/L (ref 98–107)
CO2 SERPL-SCNC: 28 MMOL/L (ref 22–29)
CREAT BLD-MCNC: 0.6 MG/DL (ref 0.76–1.27)
DEPRECATED RDW RBC AUTO: 40.8 FL (ref 37–54)
ERYTHROCYTE [DISTWIDTH] IN BLOOD BY AUTOMATED COUNT: 12.5 % (ref 12.3–15.4)
GFR SERPL CREATININE-BSD FRML MDRD: 139 ML/MIN/1.73
GLOBULIN UR ELPH-MCNC: 3.1 GM/DL
GLUCOSE BLD-MCNC: 128 MG/DL (ref 65–99)
HCT VFR BLD AUTO: 42 % (ref 37.5–51)
HGB BLD-MCNC: 15 G/DL (ref 13–17.7)
MAGNESIUM SERPL-MCNC: 1.9 MG/DL (ref 1.6–2.6)
MCH RBC QN AUTO: 31.8 PG (ref 26.6–33)
MCHC RBC AUTO-ENTMCNC: 35.7 G/DL (ref 31.5–35.7)
MCV RBC AUTO: 89.2 FL (ref 79–97)
PLATELET # BLD AUTO: 174 10*3/MM3 (ref 140–450)
PMV BLD AUTO: 10.2 FL (ref 6–12)
POTASSIUM BLD-SCNC: 4 MMOL/L (ref 3.5–5.2)
PROT SERPL-MCNC: 6.3 G/DL (ref 6–8.5)
RBC # BLD AUTO: 4.71 10*6/MM3 (ref 4.14–5.8)
SODIUM BLD-SCNC: 137 MMOL/L (ref 136–145)
WBC NRBC COR # BLD: 9.7 10*3/MM3 (ref 3.4–10.8)

## 2020-01-04 PROCEDURE — 85027 COMPLETE CBC AUTOMATED: CPT | Performed by: INTERNAL MEDICINE

## 2020-01-04 PROCEDURE — 83735 ASSAY OF MAGNESIUM: CPT | Performed by: INTERNAL MEDICINE

## 2020-01-04 PROCEDURE — 25010000002 FUROSEMIDE PER 20 MG: Performed by: INTERNAL MEDICINE

## 2020-01-04 PROCEDURE — 25010000002 METHYLPREDNISOLONE PER 40 MG: Performed by: INTERNAL MEDICINE

## 2020-01-04 PROCEDURE — 71045 X-RAY EXAM CHEST 1 VIEW: CPT

## 2020-01-04 PROCEDURE — 80053 COMPREHEN METABOLIC PANEL: CPT | Performed by: INTERNAL MEDICINE

## 2020-01-04 PROCEDURE — 94799 UNLISTED PULMONARY SVC/PX: CPT

## 2020-01-04 PROCEDURE — 97116 GAIT TRAINING THERAPY: CPT

## 2020-01-04 PROCEDURE — 94760 N-INVAS EAR/PLS OXIMETRY 1: CPT

## 2020-01-04 PROCEDURE — 25010000002 ENOXAPARIN PER 10 MG: Performed by: INTERNAL MEDICINE

## 2020-01-04 RX ORDER — BISACODYL 10 MG
10 SUPPOSITORY, RECTAL RECTAL DAILY PRN
Status: DISCONTINUED | OUTPATIENT
Start: 2020-01-04 | End: 2020-01-08 | Stop reason: HOSPADM

## 2020-01-04 RX ORDER — METHYLPREDNISOLONE SODIUM SUCCINATE 40 MG/ML
40 INJECTION, POWDER, LYOPHILIZED, FOR SOLUTION INTRAMUSCULAR; INTRAVENOUS EVERY 12 HOURS
Status: COMPLETED | OUTPATIENT
Start: 2020-01-04 | End: 2020-01-07

## 2020-01-04 RX ORDER — DOCUSATE SODIUM 100 MG/1
100 CAPSULE, LIQUID FILLED ORAL 2 TIMES DAILY
Status: DISCONTINUED | OUTPATIENT
Start: 2020-01-04 | End: 2020-01-08 | Stop reason: HOSPADM

## 2020-01-04 RX ADMIN — SODIUM CHLORIDE, PRESERVATIVE FREE 10 ML: 5 INJECTION INTRAVENOUS at 08:40

## 2020-01-04 RX ADMIN — HYDROCODONE BITARTRATE AND ACETAMINOPHEN 1 TABLET: 10; 325 TABLET ORAL at 21:49

## 2020-01-04 RX ADMIN — ENOXAPARIN SODIUM 80 MG: 80 INJECTION SUBCUTANEOUS at 08:39

## 2020-01-04 RX ADMIN — DOCUSATE SODIUM 100 MG: 100 CAPSULE, LIQUID FILLED ORAL at 11:06

## 2020-01-04 RX ADMIN — FUROSEMIDE 40 MG: 10 INJECTION, SOLUTION INTRAVENOUS at 17:36

## 2020-01-04 RX ADMIN — SODIUM CHLORIDE, PRESERVATIVE FREE 10 ML: 5 INJECTION INTRAVENOUS at 21:42

## 2020-01-04 RX ADMIN — Medication 100 MG: at 08:41

## 2020-01-04 RX ADMIN — CLOPIDOGREL BISULFATE 75 MG: 75 TABLET, FILM COATED ORAL at 08:40

## 2020-01-04 RX ADMIN — Medication 250 MG: at 21:42

## 2020-01-04 RX ADMIN — METHOCARBAMOL 500 MG: 500 TABLET ORAL at 14:15

## 2020-01-04 RX ADMIN — GUAIFENESIN 1200 MG: 600 TABLET, EXTENDED RELEASE ORAL at 21:42

## 2020-01-04 RX ADMIN — DIAZEPAM 2 MG: 2 TABLET ORAL at 14:15

## 2020-01-04 RX ADMIN — FUROSEMIDE 40 MG: 10 INJECTION, SOLUTION INTRAVENOUS at 08:40

## 2020-01-04 RX ADMIN — GUAIFENESIN 1200 MG: 600 TABLET, EXTENDED RELEASE ORAL at 08:41

## 2020-01-04 RX ADMIN — ATORVASTATIN CALCIUM 10 MG: 10 TABLET, FILM COATED ORAL at 08:40

## 2020-01-04 RX ADMIN — ENOXAPARIN SODIUM 80 MG: 80 INJECTION SUBCUTANEOUS at 21:42

## 2020-01-04 RX ADMIN — METHYLPREDNISOLONE SODIUM SUCCINATE 40 MG: 40 INJECTION, POWDER, FOR SOLUTION INTRAMUSCULAR; INTRAVENOUS at 03:12

## 2020-01-04 RX ADMIN — METHYLPREDNISOLONE SODIUM SUCCINATE 40 MG: 40 INJECTION, POWDER, FOR SOLUTION INTRAMUSCULAR; INTRAVENOUS at 08:39

## 2020-01-04 RX ADMIN — Medication 1 TABLET: at 08:40

## 2020-01-04 RX ADMIN — METOPROLOL TARTRATE 50 MG: 50 TABLET, FILM COATED ORAL at 21:42

## 2020-01-04 RX ADMIN — METHOCARBAMOL 500 MG: 500 TABLET ORAL at 05:48

## 2020-01-04 RX ADMIN — METOPROLOL TARTRATE 50 MG: 50 TABLET, FILM COATED ORAL at 08:40

## 2020-01-04 RX ADMIN — METHYLPREDNISOLONE SODIUM SUCCINATE 40 MG: 40 INJECTION, POWDER, LYOPHILIZED, FOR SOLUTION INTRAMUSCULAR; INTRAVENOUS at 17:36

## 2020-01-04 RX ADMIN — METHOCARBAMOL 500 MG: 500 TABLET ORAL at 21:42

## 2020-01-04 RX ADMIN — DOCUSATE SODIUM 100 MG: 100 CAPSULE, LIQUID FILLED ORAL at 21:42

## 2020-01-04 RX ADMIN — IPRATROPIUM BROMIDE AND ALBUTEROL SULFATE 3 ML: 2.5; .5 SOLUTION RESPIRATORY (INHALATION) at 13:46

## 2020-01-04 RX ADMIN — ACETAMINOPHEN 650 MG: 325 TABLET, FILM COATED ORAL at 05:48

## 2020-01-04 RX ADMIN — IPRATROPIUM BROMIDE AND ALBUTEROL SULFATE 3 ML: 2.5; .5 SOLUTION RESPIRATORY (INHALATION) at 20:25

## 2020-01-04 RX ADMIN — Medication 250 MG: at 08:40

## 2020-01-04 RX ADMIN — NICOTINE 1 PATCH: 21 PATCH, EXTENDED RELEASE TRANSDERMAL at 08:45

## 2020-01-04 RX ADMIN — DIAZEPAM 2 MG: 2 TABLET ORAL at 05:48

## 2020-01-04 RX ADMIN — LEVOFLOXACIN 750 MG: 750 TABLET, FILM COATED ORAL at 11:06

## 2020-01-04 RX ADMIN — DIAZEPAM 2 MG: 2 TABLET ORAL at 21:42

## 2020-01-04 RX ADMIN — IPRATROPIUM BROMIDE AND ALBUTEROL SULFATE 3 ML: 2.5; .5 SOLUTION RESPIRATORY (INHALATION) at 07:11

## 2020-01-04 RX ADMIN — POLYETHYLENE GLYCOL 3350 17 G: 17 POWDER, FOR SOLUTION ORAL at 11:06

## 2020-01-04 NOTE — PLAN OF CARE
Problem: Patient Care Overview  Goal: Plan of Care Review  Outcome: Ongoing (interventions implemented as appropriate)  Flowsheets (Taken 1/4/2020 1002)  Progress: improving  Plan of Care Reviewed With: patient  Outcome Summary: After short discussion about possible need for rehab, pt. did much better with therapy today. He is conditional independent for bed mobillity. He stood and walked CGA x 1, 110' with RWX. Pt's O2 sat was 92% on 10L following activity. Will continue to work on progressive mobility.

## 2020-01-04 NOTE — THERAPY TREATMENT NOTE
Acute Care - Physical Therapy Treatment Note  [facility]     Patient Name: Sascha Oden Sr.  : 1960  MRN: [id number]  Today's Date: 2020  Onset of Illness/Injury or Date of Surgery: 19     Referring Physician: Dr. Lang    Admit Date: 2019    Visit Dx:    ICD-10-CM ICD-9-CM   1. Traumatic rhabdomyolysis, initial encounter (CMS/[facility]) T79.6XXA 958.6   2. Hyponatremia E87.1 276.1   3. Dysphagia, unspecified type R13.10 787.20   4. Impaired functional mobility and activity tolerance Z74.09 V49.89   5. Impaired mobility and ADLs Z74.09 799.89     Patient Active Problem List   Diagnosis   • Pneumonia of both lower lobes due to infectious organism (CMS/[facility])   • Sinus tachycardia   • Tobacco abuse   • Emphysema of lung (CMS/[facility])   • Cough with hemoptysis   • Coffee ground emesis   • Paroxysmal SVT (supraventricular tachycardia) (CMS/[facility])   • Chest pain with low risk for cardiac etiology   • Hypercholesterolemia   • Hypertension   • Chronic back pain   • PAD (peripheral artery disease) (CMS/[facility])   • Rhabdomyolysis   • Hyponatremia   • Alcoholism /alcohol abuse (CMS/[facility])   • Syncope and collapse   • Diarrhea   • Cough   • COPD with acute exacerbation (CMS/[facility])   • CAP (community acquired pneumonia)   • Thrombocytopenia (CMS/[facility])   • Hypokalemia   • Influenza A   • Paroxysmal atrial fibrillation with rapid ventricular response (CMS/[facility])   • Acute respiratory failure with hypoxia (CMS/[facility])       Therapy Treatment    Rehabilitation Treatment Summary     Row Name 20 1319 20 0929          Treatment Time/Intention    Discipline  physical therapy assistant  -MF  physical therapy assistant  -MF     Document Type  therapy note (daily note)  -MF2  therapy note (daily note)  -MF2     Subjective Information  complains of;fatigue  -MF2  no complaints  -MF2     Mode of Treatment  physical therapy  -2  physical therapy  -2     Existing Precautions/Restrictions  fall;oxygen therapy device and L/min   -MF2  fall;oxygen therapy device and L/min  -MF2     Treatment Considerations/Comments  Flu A  -MF2  FLU A  -MF2     Recorded by [MF] Urvashi Silva, PTA 01/04/20 1319  [MF2] Urvashi Silva, PTA 01/04/20 1338 [MF] Urvashi Silva, PTA 01/04/20 0929  [MF2] Urvashi Silva, PTA 01/04/20 1002     Row Name 01/04/20 1319 01/04/20 0929          Vital Signs    Pre SpO2 (%)  97  -MF  91  -MF     O2 Delivery Pre Treatment  supplemental O2 10L  -MF  supplemental O2 10L  -MF     O2 Delivery Intra Treatment  supplemental O2 10L  -MF  supplemental O2 10L  -MF     Post SpO2 (%)  91  -MF  92  -MF     O2 Delivery Post Treatment  supplemental O2 10L  -MF  supplemental O2 10L  -MF     Pre Patient Position  Sitting  -MF  Sitting  -MF     Intra Patient Position  Standing  -MF  Standing  -MF     Post Patient Position  Sitting  -MF  Sitting  -MF     Recorded by [MF] Urvashi Silva, PTA 01/04/20 1338 [MF] Urvashi Silva, PTA 01/04/20 1002     Row Name 01/04/20 1319 01/04/20 0929          Bed Mobility Assessment/Treatment    Supine-Sit [place] (Bed Mobility)  --  conditional independence  -MF     Sit-Supine [place] (Bed Mobility)  conditional independence  -MF  conditional independence  -MF     Assistive Device (Bed Mobility)  --  head of bed elevated  -     Comment (Bed Mobility)  --  performed bed mobility twice due to chest x-ray   -MF     Recorded by [MF] Urvashi Silva, PTA 01/04/20 1338 [MF] Urvashi Silva, PTA 01/04/20 1002     Row Name 01/04/20 1319 01/04/20 0929          Sit-Stand Transfer    Sit-Stand [place] (Transfers)  contact guard  -MF  contact guard  -MF     Recorded by [MF] Urvashi Silva, PTA 01/04/20 1338 [MF] Urvashi Silva, PTA 01/04/20 1002     Row Name 01/04/20 1319 01/04/20 0929          Stand-Sit Transfer    Stand-Sit [place] (Transfers)  contact guard  -MF  contact guard  -MF     Recorded by [MF] Urvashi Silva, PTA 01/04/20 3664 [MF]  Urvashi Silva, PTA 01/04/20 1002     Row Name 01/04/20 1319 01/04/20 0929          Gait/Stairs Assessment/Training    [place] Level (Gait)  contact guard  -MF  contact guard  -MF     Assistive Device (Gait)  walker, front-wheeled  -MF  walker, front-wheeled  -MF     Distance in Feet (Gait)  110  -MF  110  -MF     Deviations/Abnormal Patterns (Gait)  stride length decreased;jomar decreased  -MF  stride length decreased;jomar decreased  -MF     Bilateral Gait Deviations  forward flexed posture  -MF  forward flexed posture  -MF     Recorded by [MF] Urvashi Silva, PTA 01/04/20 1338 [] Urvashi Silva, PTA 01/04/20 1002     Row Name 01/04/20 1319             Therapeutic Exercise    Lower Extremity (Therapeutic Exercise)  LAQ (long arc quad), bilateral  -MF      Lower Extremity Range of Motion (Therapeutic Exercise)  ankle dorsiflexion/plantar flexion, bilateral  -MF      Exercise Type (Therapeutic Exercise)  AROM (active range of motion)  -MF      Position (Therapeutic Exercise)  seated  -MF      Sets/Reps (Therapeutic Exercise)  5-10  -MF      Recorded by [] Urvashi Silva, PTA 01/04/20 1338      Row Name 01/04/20 1319 01/04/20 0929          Positioning and Restraints    Pre-Treatment Position  sitting in chair/recliner  -MF  in bed  -MF     Post Treatment Position  bed  -MF  chair  -MF     In Bed  fowlers;call light within reach;encouraged to call for assist;side rails up x2  -MF  --     In Chair  --  notified nsg;call light within reach;encouraged to call for assist;legs elevated  -     Recorded by [MF] Urvashi Silva, PTA 01/04/20 1338 [] Urvashi Silva, PTA 01/04/20 1002     Row Name 01/04/20 1319 01/04/20 0929          Pain Scale: Numbers Pre/Post-Treatment    Pain Scale: Numbers, Pretreatment  0/10 - no pain  -MF  0/10 - no pain  -MF     Pain Scale: Numbers, Post-Treatment  0/10 - no pain  -  0/10 - no pain  -     Recorded by [MF] Urvashi Silva, PTA  01/04/20 1338 [] Urvashi SilvaColby, PTA 01/04/20 1002       User Key  (r) = Recorded By, (t) = Taken By, (c) = Cosigned By    Initials Name Effective Dates Discipline     Urvashi SilvaColby, PTA 08/02/16 -  PT                       PT Recommendation and Plan     Plan of Care Reviewed With: patient  Progress: improving  Outcome Summary: After short discussion about possible need for rehab, pt. did much better with therapy today. He is conditional independent for bed mobillity. He stood and walked CGA x 1, 110' with RWX. Pt's O2 sat was 92% on 10L following activity. Will continue to work on progressive mobility.  Outcome Measures     Row Name 01/03/20 1435 01/02/20 1354 01/01/20 1426       How much help from another is currently needed...    Putting on and taking off regular lower body clothing?  2  -MT  3  -AO  3  -MT    Bathing (including washing, rinsing, and drying)  2  -MT  3  -AO  3  -MT    Toileting (which includes using toilet bed pan or urinal)  2  -MT  3  -AO  3  -MT    Putting on and taking off regular upper body clothing  3  -MT  3  -AO  3  -MT    Taking care of personal grooming (such as brushing teeth)  3  -MT  3  -AO  3  -MT    Eating meals  4  -MT  4  -AO  4  -MT    AM-PAC 6 Clicks Score (OT)  16  -MT  19  -AO  19  -MT      User Key  (r) = Recorded By, (t) = Taken By, (c) = Cosigned By    Initials Name Provider Type    MT Bobbi Martínez COTA/L Occupational Therapy Assistant    Jaja Almendarez COTA/L Occupational Therapy Assistant         Time Calculation:   PT Charges     Row Name 01/04/20 1338 01/04/20 1004          Time Calculation    Start Time  1319  -MF  0929  -MF     Stop Time  1336  -MF  1000  -MF     Time Calculation (min)  17 min  -MF  31 min  -MF     PT Received On  01/04/20  -MF  01/04/20  -     PT Goal Re-Cert Due Date  01/09/20  -MF  01/09/20  -MF        Time Calculation- PT    Total Timed Code Minutes- PT  17 minute(s)  -MF  31 minute(s)  -MF        Timed Charges    97239 -  Gait Training Minutes   17  -MF  31  -MF       User Key  (r) = Recorded By, (t) = Taken By, (c) = Cosigned By    Initials Name Provider Type    Urvashi Neal PTA Physical Therapy Assistant        Therapy Charges for Today     Code Description Service Date Service Provider Modifiers Qty    98989128361 HC PT THER PROC EA 15 MIN 1/3/2020 Urvashi Silva, YULY GP 1    46526440899 HC GAIT TRAINING EA 15 MIN 1/3/2020 Urvashi Silva, YULY GP 1    63810556097 HC GAIT TRAINING EA 15 MIN 1/4/2020 Urvashi Silva, YULY GP 2    63557301100 HC GAIT TRAINING EA 15 MIN 1/4/2020 Urvashi Silva, YULY GP 1          PT G-Codes  Outcome Measure Options: AM-PAC 6 Clicks Basic Mobility (PT)  AM-PAC 6 Clicks Score (PT): 16  AM-PAC 6 Clicks Score (OT): 16    Urvashi Silva PTA  1/4/2020

## 2020-01-04 NOTE — PROGRESS NOTES
[facility] Medicine Services  INPATIENT PROGRESS NOTE    Patient Name: Sascha Oden Sr.  Date of Admission: 12/29/2019  Today's Date: 01/04/20  Length of Stay: 6  Primary Care Physician: Narendra Camara MD    Subjective   Chief Complaint: Follow-up respiratory failure    HPI:  Seen and examined.  Feeling better today.  Breathing easier.  Denies overt shortness of breath.  Still with some clear/frothy phlegm.  No chest pain.  No dizziness.  Tolerating p.o.    Review of Systems   All pertinent negatives and positives are as above. All other systems have been reviewed and are negative unless otherwise stated.     Objective    Temp:  [97.7 °F (36.5 °C)-98.1 °F (36.7 °C)] 97.8 °F (36.6 °C)  Heart Rate:  [] 98  Resp:  [10-23] 11  BP: ()/() 117/83  Physical Exam   GEN: Awake, alert, interactive, in NAD.  Disheveled.  HEENT: PERRLA, EOMI, Anicteric, Trachea midline  Lungs: diminished but good air entry.  Still some fine rales.  No wheezing  Heart: RRR, +S1/s2, no rub  ABD: soft, nt/nd, +BS, no guarding/rebound  Extremities: atraumatic, no cyanosis, no peripheral edema  Skin: no rashes or lesions  Neuro: AAOx3, no focal deficits  Psych: normal mood & affect        Results Review:  I have reviewed the labs, radiology results, and diagnostic studies.    Laboratory Data:   Results from last 7 days   Lab Units 01/04/20  0250 01/03/20  0351 01/02/20  0239   WBC 10*3/mm3 9.70 3.93 4.60   HEMOGLOBIN g/dL 15.0 15.2 15.1   HEMATOCRIT % 42.0 42.5 42.6   PLATELETS 10*3/mm3 174 112* 88*        Results from last 7 days   Lab Units 01/04/20  0249 01/03/20  1739 01/03/20  0351 01/02/20  0239  12/30/19  0254   SODIUM mmol/L 137  --  136 136   < > 120*   POTASSIUM mmol/L 4.0 3.5 3.3* 3.5   < > 3.5   CHLORIDE mmol/L 97*  --  96* 96*   < > 83*   CO2 mmol/L 28.0  --  29.0 30.0*   < > 23.0   BUN mg/dL 15  --  12 10   < > 11   CREATININE mg/dL 0.60*  --  0.65* 0.78   < > 0.53*   CALCIUM  mg/dL 9.4  --  8.9 8.1*   < > 7.7*   BILIRUBIN mg/dL 0.3  --  0.2  --   --  0.3   ALK PHOS U/L 52  --  55  --   --  50   ALT (SGPT) U/L 54*  --  47*  --   --  66*   AST (SGOT) U/L 55*  --  67*  --   --  132*   GLUCOSE mg/dL 128*  --  169* 93   < > 111*    < > = values in this interval not displayed.       Culture Data:   Blood Culture   Date Value Ref Range Status   12/30/2019 No growth at 2 days  Preliminary   12/30/2019 No growth at 2 days  Preliminary     Respiratory Culture   Date Value Ref Range Status   12/30/2019 Light growth (2+) Normal Respiratory Chayito  Final       Radiology Data:   Imaging Results (Last 24 Hours)     ** No results found for the last 24 hours. **          I have reviewed the patient's current medications.     Assessment/Plan     Active Hospital Problems    Diagnosis   • Acute respiratory failure with hypoxia (CMS/HCC)   • Paroxysmal atrial fibrillation with rapid ventricular response (CMS/HCC)   • Hypokalemia   • Influenza A   • Hyponatremia   • Alcoholism /alcohol abuse (CMS/HCC)   • COPD with acute exacerbation (CMS/HCC)   • CAP (community acquired pneumonia)   • Thrombocytopenia (CMS/HCC)   • Syncope and collapse   • Rhabdomyolysis   • Hypertension       #1 acute respiratory failure with hypoxia.  Had a PO2 of 39.1 on 6 L nasal cannula 1/1.  Suspect multifactorial from COPD, influenza, likely acute on chronic diastolic heart failure.  Initial imaging on arrival had mentioned x-ray with possible pneumonia.  However patient's never had a white count is worsened with fluids.  Mild BNP elevation.  Procalcitonin is 0.15.  Suspect he worsened in the setting of P A. fib and volume/pulm edema.  He has gotten better over the last 48 hours with diuretics and steroids, cxr done today and not yet official but on personal read looks like improving pulmonary edema.  Okay finish 7 day course of levaquin, on day 6 of 7. Currently on high flow nasal cannula 10 L.  Wean O2 as able.  Nebulizers 4 times a  day.    #2 syncope and collapse -prior to arrival.  Patient is an alcoholic and had been drinking.  He was hyponatremic on arrival.  CT head was nonacute.  2D echo showed diastolic dysfunction otherwise normal.  He had had no arrhythmias for several days until he began having runs of A. fib RVR on 1/1.  Could potentially be etiology as well but less likely.  He has had no further syncope since arrival.    #3 COPD -with exacerbation.  Likely in the setting of influenza.  Steroid dosing increased 1/2 with improvement in aeration.  COPD little better.  Will decrease to 40 iv q12 hrs today.  Continue nebulizers and supportive care.    #4 hyponatremia  -on arrival.  Patient was slowly hydrated and was improving slowly day by day from 120 on arrival to 130.  Fluids were stopped and after diuretics he is now 137    #5 influenza A  -completed Tamiflu, doing better, afebrile, cough resolved.       #6 paroxysmal A. Fib - patient went A. fib RVR at 160 on 1/1.  Was given 5 IV Lopressor and returned to sinus rhythm.  His oral Lopressor was increased from 12.5 twice daily 25 twice daily.  He had another brief run after arrival to the ICU with Cardizem.  Has been primarily sinus since with frequent PACs.  Some occasional short runs of A. fib.  Patient states he has had A. fib in the past but I cannot find in the records.  He is not noted to be on a blood thinner prior to hospitalization. ? due to concerns for fall risk and alcoholism.  He denies any life-threatening bleeds or brain bleeds.  Currently on Lovenox 1 mg/kg twice a day today for now and will need to decide on oral AC prior to d/c. Continue Lopressor to 50 twice daily.    #7 ?CAP -on arrival chest x-ray was suspicious for pneumonia.  He had no white count or fever.  Did have the flu.  He has been on antibiotics since arrival and his procalcitonin is 0.15.  His x-ray has not improved and his respiratory status worsened despite antibiotics.  Suspect this may have been  fluid all along.  We will continue antibiotics however and treat for 7 days total.    #8 Rhabdomyolysis -in the setting of influenza and syncope/fall.  He denies any muscle aches or pains.  Was down from 1300 to 900 since arrival with IV fluids however he a had worsening respiratory status and appeared to be in some volume overload.  Fluids were stopped and diuretics started.  CK continues to improve and now down to 428.  Renal function stable.    #9 alcohol abuse -patient with history of alcohol abuse.  He has been on CIWA scores here and doing well with no signs of active withdrawal.  Continue multivitamin, thiamine, and folate.    #10 thrombocytopenia -likely in the setting of alcohol abuse and bone marrow suppression.  Could also have been in the setting of viral illness/influenza. No signs of active bleeding.  No need for transfusion.  Counts now up to 174 and tolerating first 24 hours of lovenox. Continue to monitor w/ repeat cbc in AM.    #11 hypokalemia -improved s/p replacement      Discharge Planning: Disposition ongoing.  Patient will need to work with PT. potential home with home with services versus SNF.  Likely 3+ days.  Remain in unit for now given high O2 needs.  Potentially transfer within the next 24 hours if further improvement/weaning      Del Park DO   01/04/20   8:44 AM

## 2020-01-04 NOTE — THERAPY TREATMENT NOTE
Acute Care - Physical Therapy Treatment Note  [facility]     Patient Name: Sascha Oden Sr.  : 1960  MRN: [id number]  Today's Date: 2020  Onset of Illness/Injury or Date of Surgery: 19     Referring Physician: Dr. Lang    Admit Date: 2019    Visit Dx:    ICD-10-CM ICD-9-CM   1. Traumatic rhabdomyolysis, initial encounter (CMS/[facility]) T79.6XXA 958.6   2. Hyponatremia E87.1 276.1   3. Dysphagia, unspecified type R13.10 787.20   4. Impaired functional mobility and activity tolerance Z74.09 V49.89   5. Impaired mobility and ADLs Z74.09 799.89     Patient Active Problem List   Diagnosis   • Pneumonia of both lower lobes due to infectious organism (CMS/[facility])   • Sinus tachycardia   • Tobacco abuse   • Emphysema of lung (CMS/[facility])   • Cough with hemoptysis   • Coffee ground emesis   • Paroxysmal SVT (supraventricular tachycardia) (CMS/[facility])   • Chest pain with low risk for cardiac etiology   • Hypercholesterolemia   • Hypertension   • Chronic back pain   • PAD (peripheral artery disease) (CMS/[facility])   • Rhabdomyolysis   • Hyponatremia   • Alcoholism /alcohol abuse (CMS/[facility])   • Syncope and collapse   • Diarrhea   • Cough   • COPD with acute exacerbation (CMS/[facility])   • CAP (community acquired pneumonia)   • Thrombocytopenia (CMS/[facility])   • Hypokalemia   • Influenza A   • Paroxysmal atrial fibrillation with rapid ventricular response (CMS/[facility])   • Acute respiratory failure with hypoxia (CMS/[facility])       Therapy Treatment    Rehabilitation Treatment Summary     Row Name 20             Treatment Time/Intention    Discipline  physical therapy assistant  -      Document Type  therapy note (daily note)  -MF2      Subjective Information  no complaints  -MF2      Mode of Treatment  physical therapy  -2      Existing Precautions/Restrictions  fall;oxygen therapy device and L/min  -2      Treatment Considerations/Comments  FLU A  -MF2      Recorded by [] Urvashi Silva PTA 20  [MF2]  Urvashi Silva, PTA 01/04/20 1002      Row Name 01/04/20 0929             Vital Signs    Pre SpO2 (%)  91  -MF      O2 Delivery Pre Treatment  supplemental O2 10L  -MF      O2 Delivery Intra Treatment  supplemental O2 10L  -MF      Post SpO2 (%)  92  -MF      O2 Delivery Post Treatment  supplemental O2 10L  -MF      Pre Patient Position  Sitting  -MF      Intra Patient Position  Standing  -MF      Post Patient Position  Sitting  -MF      Recorded by [MF] Urvashi Silva, PTA 01/04/20 1002      Row Name 01/04/20 0929             Bed Mobility Assessment/Treatment    Supine-Sit Drew (Bed Mobility)  conditional independence  -MF      Sit-Supine Drew (Bed Mobility)  conditional independence  -MF      Assistive Device (Bed Mobility)  head of bed elevated  -MF      Comment (Bed Mobility)  performed bed mobility twice due to chest x-ray   -MF      Recorded by [MF] Urvashi Silva, PTA 01/04/20 1002      Row Name 01/04/20 0929             Sit-Stand Transfer    Sit-Stand Drew (Transfers)  contact guard  -MF      Recorded by [MF] Urvashi Silva, PTA 01/04/20 1002      Row Name 01/04/20 0929             Stand-Sit Transfer    Stand-Sit Drew (Transfers)  contact guard  -MF      Recorded by [MF] Urvashi Silva, PTA 01/04/20 1002      Row Name 01/04/20 0929             Gait/Stairs Assessment/Training    Drew Level (Gait)  contact guard  -MF      Assistive Device (Gait)  walker, front-wheeled  -MF      Distance in Feet (Gait)  110  -MF      Deviations/Abnormal Patterns (Gait)  stride length decreased;jomar decreased  -MF      Bilateral Gait Deviations  forward flexed posture  -MF      Recorded by [MF] Urvashi Silva, PTA 01/04/20 1002      Row Name 01/04/20 0929             Positioning and Restraints    Pre-Treatment Position  in bed  -MF      Post Treatment Position  chair  -MF      In Chair  notified nsg;call light within reach;encouraged to call for assist;legs  elevated  -MF      Recorded by [] Urvashi Silva, PTA 01/04/20 1002      Row Name 01/04/20 0929             Pain Scale: Numbers Pre/Post-Treatment    Pain Scale: Numbers, Pretreatment  0/10 - no pain  -      Pain Scale: Numbers, Post-Treatment  0/10 - no pain  -      Recorded by [] Urvashi Silva, PTA 01/04/20 1002        User Key  (r) = Recorded By, (t) = Taken By, (c) = Cosigned By    Initials Name Effective Dates Discipline     Urvashi Silva, PTA 08/02/16 -  PT                       PT Recommendation and Plan     Plan of Care Reviewed With: patient  Progress: improving  Outcome Summary: After short discussion about possible need for rehab, pt. did much better with therapy today. He is conditional independent for bed mobillity. He stood and walked CGA x 1, 110' with RWX. Pt's O2 sat was 92% on 10L following activity. Will continue to work on progressive mobility.  Outcome Measures     Row Name 01/03/20 1435 01/02/20 1354 01/01/20 1426       How much help from another is currently needed...    Putting on and taking off regular lower body clothing?  2  -MT  3  -AO  3  -MT    Bathing (including washing, rinsing, and drying)  2  -MT  3  -AO  3  -MT    Toileting (which includes using toilet bed pan or urinal)  2  -MT  3  -AO  3  -MT    Putting on and taking off regular upper body clothing  3  -MT  3  -AO  3  -MT    Taking care of personal grooming (such as brushing teeth)  3  -MT  3  -AO  3  -MT    Eating meals  4  -MT  4  -AO  4  -MT    AM-PAC 6 Clicks Score (OT)  16  -MT  19  -AO  19  -MT      User Key  (r) = Recorded By, (t) = Taken By, (c) = Cosigned By    Initials Name Provider Type    MT Bobbi Martínez COTA/L Occupational Therapy Assistant    Jaja Almendarez COTA/L Occupational Therapy Assistant         Time Calculation:   PT Charges     Row Name 01/04/20 1004             Time Calculation    Start Time  0929  -      Stop Time  1000  -MF      Time Calculation (min)  31 min  -       PT Received On  01/04/20  -      PT Goal Re-Cert Due Date  01/09/20  -         Time Calculation- PT    Total Timed Code Minutes- PT  31 minute(s)  -MF         Timed Charges    08749 - Gait Training Minutes   31  -MF        User Key  (r) = Recorded By, (t) = Taken By, (c) = Cosigned By    Initials Name Provider Type    Urvashi Neal, PTA Physical Therapy Assistant        Therapy Charges for Today     Code Description Service Date Service Provider Modifiers Qty    82139313739 HC PT THER PROC EA 15 MIN 1/3/2020 Urvashi Sliva, YULY GP 1    62298189202 HC GAIT TRAINING EA 15 MIN 1/3/2020 Urvashi Silva, PTA GP 1    30202147630 HC GAIT TRAINING EA 15 MIN 1/4/2020 Urvashi Silva, YULY GP 2          PT G-Codes  Outcome Measure Options: AM-PAC 6 Clicks Basic Mobility (PT)  AM-PAC 6 Clicks Score (PT): 16  AM-PAC 6 Clicks Score (OT): 16    Urvashi Silva PTA  1/4/2020

## 2020-01-05 ENCOUNTER — APPOINTMENT (OUTPATIENT)
Dept: CT IMAGING | Facility: HOSPITAL | Age: 60
End: 2020-01-05

## 2020-01-05 LAB
ANION GAP SERPL CALCULATED.3IONS-SCNC: 12 MMOL/L (ref 5–15)
BUN BLD-MCNC: 17 MG/DL (ref 6–20)
BUN/CREAT SERPL: 27.9 (ref 7–25)
CALCIUM SPEC-SCNC: 9.1 MG/DL (ref 8.6–10.5)
CHLORIDE SERPL-SCNC: 95 MMOL/L (ref 98–107)
CO2 SERPL-SCNC: 30 MMOL/L (ref 22–29)
CREAT BLD-MCNC: 0.61 MG/DL (ref 0.76–1.27)
D DIMER PPP FEU-MCNC: 1.22 MG/L (FEU) (ref 0–0.5)
DEPRECATED RDW RBC AUTO: 41.9 FL (ref 37–54)
ERYTHROCYTE [DISTWIDTH] IN BLOOD BY AUTOMATED COUNT: 12.8 % (ref 12.3–15.4)
GFR SERPL CREATININE-BSD FRML MDRD: 136 ML/MIN/1.73
GLUCOSE BLD-MCNC: 125 MG/DL (ref 65–99)
HCT VFR BLD AUTO: 43 % (ref 37.5–51)
HGB BLD-MCNC: 15.3 G/DL (ref 13–17.7)
MCH RBC QN AUTO: 31.7 PG (ref 26.6–33)
MCHC RBC AUTO-ENTMCNC: 35.6 G/DL (ref 31.5–35.7)
MCV RBC AUTO: 89.2 FL (ref 79–97)
PLATELET # BLD AUTO: 246 10*3/MM3 (ref 140–450)
PMV BLD AUTO: 10.1 FL (ref 6–12)
POTASSIUM BLD-SCNC: 3.4 MMOL/L (ref 3.5–5.2)
RBC # BLD AUTO: 4.82 10*6/MM3 (ref 4.14–5.8)
SODIUM BLD-SCNC: 137 MMOL/L (ref 136–145)
WBC NRBC COR # BLD: 10.11 10*3/MM3 (ref 3.4–10.8)

## 2020-01-05 PROCEDURE — 94799 UNLISTED PULMONARY SVC/PX: CPT

## 2020-01-05 PROCEDURE — 94760 N-INVAS EAR/PLS OXIMETRY 1: CPT

## 2020-01-05 PROCEDURE — 25010000002 METHYLPREDNISOLONE PER 40 MG: Performed by: INTERNAL MEDICINE

## 2020-01-05 PROCEDURE — 85027 COMPLETE CBC AUTOMATED: CPT | Performed by: INTERNAL MEDICINE

## 2020-01-05 PROCEDURE — 80048 BASIC METABOLIC PNL TOTAL CA: CPT | Performed by: INTERNAL MEDICINE

## 2020-01-05 PROCEDURE — 71275 CT ANGIOGRAPHY CHEST: CPT

## 2020-01-05 PROCEDURE — 97116 GAIT TRAINING THERAPY: CPT

## 2020-01-05 PROCEDURE — 0 IOPAMIDOL PER 1 ML: Performed by: INTERNAL MEDICINE

## 2020-01-05 PROCEDURE — 97110 THERAPEUTIC EXERCISES: CPT

## 2020-01-05 PROCEDURE — 25010000002 ENOXAPARIN PER 10 MG: Performed by: INTERNAL MEDICINE

## 2020-01-05 PROCEDURE — 85379 FIBRIN DEGRADATION QUANT: CPT | Performed by: INTERNAL MEDICINE

## 2020-01-05 PROCEDURE — 25010000002 FUROSEMIDE PER 20 MG: Performed by: INTERNAL MEDICINE

## 2020-01-05 RX ORDER — IPRATROPIUM BROMIDE AND ALBUTEROL SULFATE 2.5; .5 MG/3ML; MG/3ML
3 SOLUTION RESPIRATORY (INHALATION)
Status: DISCONTINUED | OUTPATIENT
Start: 2020-01-05 | End: 2020-01-06

## 2020-01-05 RX ADMIN — GUAIFENESIN 1200 MG: 600 TABLET, EXTENDED RELEASE ORAL at 09:13

## 2020-01-05 RX ADMIN — ATORVASTATIN CALCIUM 10 MG: 10 TABLET, FILM COATED ORAL at 09:13

## 2020-01-05 RX ADMIN — DIAZEPAM 2 MG: 2 TABLET ORAL at 21:14

## 2020-01-05 RX ADMIN — Medication 250 MG: at 09:13

## 2020-01-05 RX ADMIN — METOPROLOL TARTRATE 50 MG: 50 TABLET, FILM COATED ORAL at 20:47

## 2020-01-05 RX ADMIN — POTASSIUM CHLORIDE 40 MEQ: 750 CAPSULE, EXTENDED RELEASE ORAL at 17:21

## 2020-01-05 RX ADMIN — DIAZEPAM 2 MG: 2 TABLET ORAL at 06:03

## 2020-01-05 RX ADMIN — IOPAMIDOL 125 ML: 755 INJECTION, SOLUTION INTRAVENOUS at 12:17

## 2020-01-05 RX ADMIN — IPRATROPIUM BROMIDE AND ALBUTEROL SULFATE 3 ML: 2.5; .5 SOLUTION RESPIRATORY (INHALATION) at 06:42

## 2020-01-05 RX ADMIN — ENOXAPARIN SODIUM 80 MG: 80 INJECTION SUBCUTANEOUS at 20:47

## 2020-01-05 RX ADMIN — DIAZEPAM 2 MG: 2 TABLET ORAL at 13:01

## 2020-01-05 RX ADMIN — POLYETHYLENE GLYCOL 3350 17 G: 17 POWDER, FOR SOLUTION ORAL at 09:14

## 2020-01-05 RX ADMIN — METHOCARBAMOL 500 MG: 500 TABLET ORAL at 21:14

## 2020-01-05 RX ADMIN — DOCUSATE SODIUM 100 MG: 100 CAPSULE, LIQUID FILLED ORAL at 20:48

## 2020-01-05 RX ADMIN — IPRATROPIUM BROMIDE AND ALBUTEROL SULFATE 3 ML: 2.5; .5 SOLUTION RESPIRATORY (INHALATION) at 18:46

## 2020-01-05 RX ADMIN — GUAIFENESIN 1200 MG: 600 TABLET, EXTENDED RELEASE ORAL at 20:48

## 2020-01-05 RX ADMIN — HYDROCODONE BITARTRATE AND ACETAMINOPHEN 1 TABLET: 10; 325 TABLET ORAL at 19:33

## 2020-01-05 RX ADMIN — CLOPIDOGREL BISULFATE 75 MG: 75 TABLET, FILM COATED ORAL at 09:13

## 2020-01-05 RX ADMIN — Medication 1 TABLET: at 09:14

## 2020-01-05 RX ADMIN — SODIUM CHLORIDE, PRESERVATIVE FREE 10 ML: 5 INJECTION INTRAVENOUS at 09:19

## 2020-01-05 RX ADMIN — Medication 100 MG: at 09:14

## 2020-01-05 RX ADMIN — IPRATROPIUM BROMIDE AND ALBUTEROL SULFATE 3 ML: 2.5; .5 SOLUTION RESPIRATORY (INHALATION) at 14:47

## 2020-01-05 RX ADMIN — IPRATROPIUM BROMIDE AND ALBUTEROL SULFATE 3 ML: 2.5; .5 SOLUTION RESPIRATORY (INHALATION) at 22:31

## 2020-01-05 RX ADMIN — NICOTINE 1 PATCH: 21 PATCH, EXTENDED RELEASE TRANSDERMAL at 09:13

## 2020-01-05 RX ADMIN — FUROSEMIDE 40 MG: 10 INJECTION, SOLUTION INTRAVENOUS at 09:14

## 2020-01-05 RX ADMIN — DOCUSATE SODIUM 100 MG: 100 CAPSULE, LIQUID FILLED ORAL at 09:13

## 2020-01-05 RX ADMIN — METHOCARBAMOL 500 MG: 500 TABLET ORAL at 13:01

## 2020-01-05 RX ADMIN — ACETAMINOPHEN 650 MG: 325 TABLET, FILM COATED ORAL at 16:41

## 2020-01-05 RX ADMIN — ENOXAPARIN SODIUM 80 MG: 80 INJECTION SUBCUTANEOUS at 09:13

## 2020-01-05 RX ADMIN — METHYLPREDNISOLONE SODIUM SUCCINATE 40 MG: 40 INJECTION, POWDER, LYOPHILIZED, FOR SOLUTION INTRAMUSCULAR; INTRAVENOUS at 06:44

## 2020-01-05 RX ADMIN — SODIUM CHLORIDE, PRESERVATIVE FREE 10 ML: 5 INJECTION INTRAVENOUS at 21:16

## 2020-01-05 RX ADMIN — METOPROLOL TARTRATE 50 MG: 50 TABLET, FILM COATED ORAL at 09:13

## 2020-01-05 RX ADMIN — METHOCARBAMOL 500 MG: 500 TABLET ORAL at 06:03

## 2020-01-05 RX ADMIN — Medication 250 MG: at 20:48

## 2020-01-05 RX ADMIN — METHYLPREDNISOLONE SODIUM SUCCINATE 40 MG: 40 INJECTION, POWDER, LYOPHILIZED, FOR SOLUTION INTRAMUSCULAR; INTRAVENOUS at 17:22

## 2020-01-05 RX ADMIN — POTASSIUM CHLORIDE 40 MEQ: 750 CAPSULE, EXTENDED RELEASE ORAL at 21:14

## 2020-01-05 RX ADMIN — LEVOFLOXACIN 750 MG: 750 TABLET, FILM COATED ORAL at 12:55

## 2020-01-05 RX ADMIN — IPRATROPIUM BROMIDE AND ALBUTEROL SULFATE 3 ML: 2.5; .5 SOLUTION RESPIRATORY (INHALATION) at 10:41

## 2020-01-05 NOTE — PLAN OF CARE
Patient alert and orient. HR was initially NSR with PAC until 1730 when he converted to A-Fib rates . VSS. Afebrile. UOP adequate, continues to diuresis. UOP 1600 ml for the shift. Fine crackles heard in the bases. Denies CP.

## 2020-01-05 NOTE — PROGRESS NOTES
[facility] Medicine Services  INPATIENT PROGRESS NOTE    Patient Name: Sascha Oden Sr.  Date of Admission: 12/29/2019  Today's Date: 01/05/20  Length of Stay: 7  Primary Care Physician: Narendra Camara MD    Subjective   Chief Complaint: Follow-up respiratory failure    HPI:  Pt seen and examined, feels better, doing better, less cough. Denies chest pain or sob. Still in and out of afib. Still on 10L high flow w/ sp02 89-91%.     Review of Systems   All pertinent negatives and positives are as above. All other systems have been reviewed and are negative unless otherwise stated.     Objective    Temp:  [97.6 °F (36.4 °C)-97.9 °F (36.6 °C)] 97.8 °F (36.6 °C)  Heart Rate:  [] 89  Resp:  [8-17] 15  BP: (104-137)/(76-97) 136/84  Physical Exam   GEN: Awake, alert, interactive, in NAD.  Disheveled.  HEENT: PERRLA, EOMI, Anicteric, Trachea midline  Lungs: diminished but good air entry/flow, no wheezing.  Still some fine rales.    Heart: RRR, +S1/s2, no rub  ABD: soft, nt/nd, +BS, no guarding/rebound  Extremities: atraumatic, no cyanosis, no peripheral edema  Skin: no rashes or lesions  Neuro: AAOx3, no focal deficits  Psych: normal mood & affect        Results Review:  I have reviewed the labs, radiology results, and diagnostic studies.    Laboratory Data:   Results from last 7 days   Lab Units 01/05/20  0231 01/04/20  0250 01/03/20  0351   WBC 10*3/mm3 10.11 9.70 3.93   HEMOGLOBIN g/dL 15.3 15.0 15.2   HEMATOCRIT % 43.0 42.0 42.5   PLATELETS 10*3/mm3 246 174 112*        Results from last 7 days   Lab Units 01/05/20  0231 01/04/20  0249 01/03/20  1739 01/03/20  0351  12/30/19  0254   SODIUM mmol/L 137 137  --  136   < > 120*   POTASSIUM mmol/L 3.4* 4.0 3.5 3.3*   < > 3.5   CHLORIDE mmol/L 95* 97*  --  96*   < > 83*   CO2 mmol/L 30.0* 28.0  --  29.0   < > 23.0   BUN mg/dL 17 15  --  12   < > 11   CREATININE mg/dL 0.61* 0.60*  --  0.65*   < > 0.53*   CALCIUM mg/dL 9.1 9.4  --  8.9    < > 7.7*   BILIRUBIN mg/dL  --  0.3  --  0.2  --  0.3   ALK PHOS U/L  --  52  --  55  --  50   ALT (SGPT) U/L  --  54*  --  47*  --  66*   AST (SGOT) U/L  --  55*  --  67*  --  132*   GLUCOSE mg/dL 125* 128*  --  169*   < > 111*    < > = values in this interval not displayed.       Culture Data:   Blood Culture   Date Value Ref Range Status   12/30/2019 No growth at 2 days  Preliminary   12/30/2019 No growth at 2 days  Preliminary     Respiratory Culture   Date Value Ref Range Status   12/30/2019 Light growth (2+) Normal Respiratory Chayito  Final       Radiology Data:   Imaging Results (Last 24 Hours)     Procedure Component Value Units Date/Time    XR Chest 1 View [[id number]] Collected:  01/04/20 0959     Updated:  01/04/20 1004    Narrative:       EXAMINATION: XR CHEST 1 VW-. 1/4/2020 9:59 AM CST     CHEST, ONE VIEW:     HISTORY: Hypoxia     COMPARISON: 01/01/2020, 12/30/2019 and 8/30/2019     A single frontal chest radiograph was obtained.     FINDINGS:     Decreasing perihilar interstitial infiltration observed with mild  residual.     The heart size also decreasing.     Deformity of the right clavicle observed compatible with old fracture  with malunion.                                     Impression:       1. Decreasing perihilar interstitial infiltrates, improving interstitial  pulmonary edema considered.     This report was finalized on 01/04/2020 10:01 by Dr. Kee Hooks MD.          I have reviewed the patient's current medications.     Assessment/Plan     Active Hospital Problems    Diagnosis   • Acute respiratory failure with hypoxia (CMS/HCC)   • Paroxysmal atrial fibrillation with rapid ventricular response (CMS/HCC)   • Hypokalemia   • Influenza A   • Hyponatremia   • Alcoholism /alcohol abuse (CMS/HCC)   • COPD with acute exacerbation (CMS/HCC)   • CAP (community acquired pneumonia)   • Thrombocytopenia (CMS/HCC)   • Syncope and collapse   • Rhabdomyolysis   • Hypertension       #1 acute  respiratory failure with hypoxia.  Had a PO2 of 39.1 on 6 L nasal cannula 1/1.  Suspect multifactorial from COPD, influenza, likely acute on chronic diastolic heart failure.  Initial imaging on arrival had mentioned x-ray with possible pneumonia.  However patient's never had a white count is worsened with fluids.  Mild BNP elevation.  Procalcitonin is 0.15.  Suspect he worsened in the setting of P A. fib and volume/pulm edema.  He has gotten better clinically over the last 72 hours with diuretics and steroids and cxr done 1/4 with  improving pulmonary edema.  Despite this patient is still on 10L high flow and not weaning. He was not on lovenox first few days due to thrombocytopenia, he is already on full dose lovenox now last 48 hrs due to afib runs. Will get d-dimer, if neg will do ct chest w/o, if positive CTA to make sure we aren't missing some other process. Finishing 7 day course of levaquin today. Wean O2 as able.  Continue nebulizers and steroids.    #2 syncope and collapse -prior to arrival.  Patient is an alcoholic and had been drinking.  He was hyponatremic on arrival.  CT head was nonacute.  2D echo showed diastolic dysfunction otherwise normal.  He had had no arrhythmias for several days until he began having runs of A. fib RVR on 1/1.  Could potentially be etiology as well but less likely.  He has had no further syncope since arrival. Again... ? PE, will f/u d-dimer.    #3 COPD -with exacerbation.  Likely in the setting of influenza.  Steroid dosing increased 1/2 with improvement in aeration.  COPD little better.  Will decrease to 40 iv q12 hrs today.  Continue nebulizers and supportive care.    #4 acute on chronic diastolic chf - doing well with lasix, now -5L for stay, cxr improved. Renal function stable. Continue diuresis.     #5 hyponatremia  -on arrival.  Patient was slowly hydrated and was improving slowly day by day from 120 on arrival to 130.  Fluids were stopped and after diuretics he is now  137    #6 influenza A  -completed Tamiflu, doing better, afebrile, cough resolved.       #7 paroxysmal A. Fib - patient went A. fib RVR at 160 on 1/1.  Was given 5 IV Lopressor and returned to sinus rhythm.  His oral Lopressor was increased from 12.5 twice daily 25 twice daily.  He had another brief run after arrival to the ICU with Cardizem.  Has been primarily sinus since with frequent PACs.  Some occasional short runs of A. fib.  Patient states he has had A. fib in the past but I cannot find in the records.  He is not noted to be on a blood thinner prior to hospitalization. ? due to concerns for fall risk and alcoholism.  He denies any life-threatening bleeds or brain bleeds.  Currently on Lovenox 1 mg/kg twice a day today for now and will need to decide on oral AC prior to d/c. Continue Lopressor to 50 twice daily.    #8 ?CAP -on arrival chest x-ray was suspicious for pneumonia.  He had no white count or fever.  Did have the flu.  He has been on antibiotics since arrival and his procalcitonin is 0.15.  His x-ray has not improved and his respiratory status worsened despite antibiotics.  Suspect this may have been fluid all along.  We will continue antibiotics however and treat for 7 days total.    #9 Rhabdomyolysis -in the setting of influenza and syncope/fall.  He denies any muscle aches or pains.  Was down from 1300 to 900 since arrival with IV fluids however he a had worsening respiratory status and appeared to be in some volume overload.  Fluids were stopped and diuretics started.  CK improved under 500.  Renal function stable.    #9 alcohol abuse -patient with history of alcohol abuse.  He has been on CIWA scores here and doing well with no signs of active withdrawal.  D/c ciwa scale and prn ativan today as he has not needed it. Continue multivitamin, thiamine, and folate.    #10 thrombocytopenia -likely in the setting of alcohol abuse and bone marrow suppression.  Could also have been in the setting of  viral illness/influenza. No signs of active bleeding.  No need for transfusion.  Counts now normal last 48 hours and tolerating lovenox.     #11 hypokalemia - replace by protocol, recheck in AM along w/ mag      Discharge Planning: Disposition ongoing.  Patient working with PT. potential home with home with services versus SNF.  Likely 3+ days.  Remain in unit for now given high O2 needs.  Will f/u CT. Remains very guarded/tenuous. Try to wean 02 as able.       [place] DO   01/05/20   8:51 AM

## 2020-01-05 NOTE — THERAPY TREATMENT NOTE
Acute Care - Physical Therapy Treatment Note  [facility]     Patient Name: Sascha Oden Sr.  : 1960  MRN: [id number]  Today's Date: 2020  Onset of Illness/Injury or Date of Surgery: 19     Referring Physician: Dr. Lang    Admit Date: 2019    Visit Dx:    ICD-10-CM ICD-9-CM   1. Traumatic rhabdomyolysis, initial encounter (CMS/[facility]) T79.6XXA 958.6   2. Hyponatremia E87.1 276.1   3. Dysphagia, unspecified type R13.10 787.20   4. Impaired functional mobility and activity tolerance Z74.09 V49.89   5. Impaired mobility and ADLs Z74.09 799.89     Patient Active Problem List   Diagnosis   • Pneumonia of both lower lobes due to infectious organism (CMS/[facility])   • Sinus tachycardia   • Tobacco abuse   • Emphysema of lung (CMS/[facility])   • Cough with hemoptysis   • Coffee ground emesis   • Paroxysmal SVT (supraventricular tachycardia) (CMS/[facility])   • Chest pain with low risk for cardiac etiology   • Hypercholesterolemia   • Hypertension   • Chronic back pain   • PAD (peripheral artery disease) (CMS/[facility])   • Rhabdomyolysis   • Hyponatremia   • Alcoholism /alcohol abuse (CMS/[facility])   • Syncope and collapse   • Diarrhea   • Cough   • COPD with acute exacerbation (CMS/[facility])   • CAP (community acquired pneumonia)   • Thrombocytopenia (CMS/[facility])   • Hypokalemia   • Influenza A   • Paroxysmal atrial fibrillation with rapid ventricular response (CMS/[facility])   • Acute respiratory failure with hypoxia (CMS/[facility])       Therapy Treatment    Rehabilitation Treatment Summary     Row Name 20             Treatment Time/Intention    Discipline  physical therapy assistant  -      Document Type  therapy note (daily note)  -MF2      Subjective Information  no complaints  -MF2      Mode of Treatment  physical therapy  -2      Existing Precautions/Restrictions  fall;oxygen therapy device and L/min  -2      Treatment Considerations/Comments  FLU A  -MF2      Recorded by [] Urvashi Silva PTA 20  [MF2]  Urvashi Silva, [facility] 01/05/20 0947      Row Name 01/05/20 0907             Vital Signs    Pre SpO2 (%)  90  -MF      O2 Delivery Pre Treatment  supplemental O2 10L  -MF      Intra SpO2 (%)  86  -MF      O2 Delivery Intra Treatment  supplemental O2 8L  -MF      Post SpO2 (%)  91  -MF      O2 Delivery Post Treatment  supplemental O2 10L  -MF      Pre Patient Position  Sitting  -MF      Intra Patient Position  Standing  -MF      Post Patient Position  Sitting  -MF      Recorded by [] Urvashi Silva, [facility] 01/05/20 0947      Row Name 01/05/20 0907             Bed Mobility Assessment/Treatment    Supine-Sit [place] (Bed Mobility)  conditional independence  -      Assistive Device (Bed Mobility)  head of bed elevated  -      Recorded by [] Urvashi Silva, [facility] 01/05/20 0947      Row Name 01/05/20 0907             Sit-Stand Transfer    Sit-Stand [place] (Transfers)  stand by assist  -      Recorded by [] Urvashi Silva, [facility] 01/05/20 0947      Row Name 01/05/20 0907             Stand-Sit Transfer    Stand-Sit [place] (Transfers)  stand by assist  -      Recorded by [] Urvashi Silva, [facility] 01/05/20 0947      Row Name 01/05/20 0907             Gait/Stairs Assessment/Training    [place] Level (Gait)  contact guard  -      Assistive Device (Gait)  walker, front-wheeled  -      Distance in Feet (Gait)  200 one standing rest  -      Deviations/Abnormal Patterns (Gait)  stride length decreased;jomar decreased  -      Bilateral Gait Deviations  forward flexed posture  -      Recorded by [MF] Urvashi Silva, [facility] 01/05/20 0947      Row Name 01/05/20 0907             Therapeutic Exercise    Lower Extremity (Therapeutic Exercise)  LAQ (long arc quad), bilateral  -      Lower Extremity Range of Motion (Therapeutic Exercise)  hip flexion/extension, bilateral;ankle dorsiflexion/plantar flexion, bilateral  -      Exercise Type (Therapeutic Exercise)  AROM (active  range of motion)  -      Position (Therapeutic Exercise)  seated  -      Sets/Reps (Therapeutic Exercise)  20 15 reps hip flexion  -      Recorded by [] Urvashi Silva, [facility] 01/05/20 0947      Row Name 01/05/20 0907             Positioning and Restraints    Pre-Treatment Position  in bed  -      Post Treatment Position  chair  -MF      In Chair  sitting;call light within reach;encouraged to call for assist  -MF      Recorded by [] Urvashi Silva, [facility] 01/05/20 0947      Row Name 01/05/20 0907             Pain Scale: Numbers Pre/Post-Treatment    Pain Scale: Numbers, Pretreatment  0/10 - no pain  -      Pain Scale: Numbers, Post-Treatment  0/10 - no pain  -      Recorded by [] Urvashi Silva, [facility] 01/05/20 0947        User Key  (r) = Recorded By, (t) = Taken By, (c) = Cosigned By    Initials Name Effective Dates Discipline     Urvashi Silva, [facility] 08/02/16 -  PT                       PT Recommendation and Plan     Plan of Care Reviewed With: patient  Progress: improving  Outcome Summary: Pt. continues to do well with mobility. He is conditional independent for bed mobility. He stands and ambulates with CGA/SBA x 1. He walked 20' with 1 standing rest. His O2 sats dropped to 86% on 8L as a trial. Resumed 10L of O2 once back on room. Will continue to work on strengthening and endurance.  Outcome Measures     Row Name 01/03/20 1435 01/02/20 1354          How much help from another is currently needed...    Putting on and taking off regular lower body clothing?  2  -MT  3  -AO     Bathing (including washing, rinsing, and drying)  2  -MT  3  -AO     Toileting (which includes using toilet bed pan or urinal)  2  -MT  3  -AO     Putting on and taking off regular upper body clothing  3  -MT  3  -AO     Taking care of personal grooming (such as brushing teeth)  3  -MT  3  -AO     Eating meals  4  -MT  4  -AO     AM-PAC 6 Clicks Score (OT)  16  -MT  19  -AO       User Key  (r) = Recorded By,  (t) = Taken By, (c) = Cosigned By    Initials Name Provider Type    MT Bobbi Martínez COTA/L Occupational Therapy Assistant    Jaja Almendarez COTA/L Occupational Therapy Assistant         Time Calculation:   PT Charges     Row Name 01/05/20 0949             Time Calculation    Start Time  0907  -MF      Stop Time  0945  -MF      Time Calculation (min)  38 min  -MF      PT Received On  01/05/20  -      PT Goal Re-Cert Due Date  01/09/20  -MF         Time Calculation- PT    Total Timed Code Minutes- PT  38 minute(s)  -MF         Timed Charges    74431 - PT Therapeutic Exercise Minutes  13  -MF      49628 - Gait Training Minutes   25  -MF        User Key  (r) = Recorded By, (t) = Taken By, (c) = Cosigned By    Initials Name Provider Type     Urvashi Silva PTA Physical Therapy Assistant        Therapy Charges for Today     Code Description Service Date Service Provider Modifiers Qty    49644183690 HC GAIT TRAINING EA 15 MIN 1/4/2020 Urvashi Silva, PTA GP 2    19687238457 HC GAIT TRAINING EA 15 MIN 1/4/2020 Urvashi Silva, PTA GP 1    84309026682 HC PT THER PROC EA 15 MIN 1/5/2020 Urvashi Silva, PTA GP 1    41628864842 HC GAIT TRAINING EA 15 MIN 1/5/2020 Urvashi Silva, PTA GP 2          PT G-Codes  Outcome Measure Options: AM-PAC 6 Clicks Basic Mobility (PT)  AM-PAC 6 Clicks Score (PT): 16  AM-PAC 6 Clicks Score (OT): 16    Urvashi Silva PTA  1/5/2020

## 2020-01-05 NOTE — PLAN OF CARE
Problem: Patient Care Overview  Goal: Plan of Care Review  Outcome: Ongoing (interventions implemented as appropriate)  Flowsheets (Taken 1/5/2020 0180)  Progress: improving  Plan of Care Reviewed With: patient  Outcome Summary: Pt. continues to do well with mobility. He is conditional independent for bed mobility. He stands and ambulates with CGA/SBA x 1. He walked 20' with 1 standing rest. His O2 sats dropped to 86% on 8L as a trial. Resumed 10L of O2 once back on room. Will continue to work on strengthening and endurance.

## 2020-01-06 LAB
ANION GAP SERPL CALCULATED.3IONS-SCNC: 12 MMOL/L (ref 5–15)
BUN BLD-MCNC: 15 MG/DL (ref 6–20)
BUN/CREAT SERPL: 20.5 (ref 7–25)
CALCIUM SPEC-SCNC: 9 MG/DL (ref 8.6–10.5)
CHLORIDE SERPL-SCNC: 97 MMOL/L (ref 98–107)
CO2 SERPL-SCNC: 28 MMOL/L (ref 22–29)
CREAT BLD-MCNC: 0.73 MG/DL (ref 0.76–1.27)
GFR SERPL CREATININE-BSD FRML MDRD: 111 ML/MIN/1.73
GLUCOSE BLD-MCNC: 176 MG/DL (ref 65–99)
MAGNESIUM SERPL-MCNC: 2.4 MG/DL (ref 1.6–2.6)
PHOSPHATE SERPL-MCNC: 4.2 MG/DL (ref 2.5–4.5)
POTASSIUM BLD-SCNC: 4.2 MMOL/L (ref 3.5–5.2)
SODIUM BLD-SCNC: 137 MMOL/L (ref 136–145)

## 2020-01-06 PROCEDURE — 25010000002 ENOXAPARIN PER 10 MG: Performed by: INTERNAL MEDICINE

## 2020-01-06 PROCEDURE — 25010000002 FUROSEMIDE PER 20 MG: Performed by: INTERNAL MEDICINE

## 2020-01-06 PROCEDURE — 83735 ASSAY OF MAGNESIUM: CPT | Performed by: INTERNAL MEDICINE

## 2020-01-06 PROCEDURE — 94799 UNLISTED PULMONARY SVC/PX: CPT

## 2020-01-06 PROCEDURE — 25010000002 METHYLPREDNISOLONE PER 40 MG: Performed by: INTERNAL MEDICINE

## 2020-01-06 PROCEDURE — 80048 BASIC METABOLIC PNL TOTAL CA: CPT | Performed by: INTERNAL MEDICINE

## 2020-01-06 PROCEDURE — 84100 ASSAY OF PHOSPHORUS: CPT | Performed by: INTERNAL MEDICINE

## 2020-01-06 PROCEDURE — 97116 GAIT TRAINING THERAPY: CPT

## 2020-01-06 PROCEDURE — 97110 THERAPEUTIC EXERCISES: CPT

## 2020-01-06 RX ORDER — FLUTICASONE PROPIONATE 50 MCG
2 SPRAY, SUSPENSION (ML) NASAL 2 TIMES DAILY
Status: DISCONTINUED | OUTPATIENT
Start: 2020-01-06 | End: 2020-01-08 | Stop reason: HOSPADM

## 2020-01-06 RX ORDER — IPRATROPIUM BROMIDE AND ALBUTEROL SULFATE 2.5; .5 MG/3ML; MG/3ML
3 SOLUTION RESPIRATORY (INHALATION)
Status: DISCONTINUED | OUTPATIENT
Start: 2020-01-07 | End: 2020-01-08 | Stop reason: HOSPADM

## 2020-01-06 RX ADMIN — IPRATROPIUM BROMIDE AND ALBUTEROL SULFATE 3 ML: 2.5; .5 SOLUTION RESPIRATORY (INHALATION) at 06:05

## 2020-01-06 RX ADMIN — ATORVASTATIN CALCIUM 10 MG: 10 TABLET, FILM COATED ORAL at 09:14

## 2020-01-06 RX ADMIN — FLUTICASONE PROPIONATE 2 SPRAY: 50 SPRAY, METERED NASAL at 21:28

## 2020-01-06 RX ADMIN — ENOXAPARIN SODIUM 80 MG: 80 INJECTION SUBCUTANEOUS at 21:27

## 2020-01-06 RX ADMIN — Medication 1 TABLET: at 13:18

## 2020-01-06 RX ADMIN — CLOPIDOGREL BISULFATE 75 MG: 75 TABLET, FILM COATED ORAL at 09:14

## 2020-01-06 RX ADMIN — DOCUSATE SODIUM 100 MG: 100 CAPSULE, LIQUID FILLED ORAL at 21:27

## 2020-01-06 RX ADMIN — METHOCARBAMOL 500 MG: 500 TABLET ORAL at 22:36

## 2020-01-06 RX ADMIN — DIAZEPAM 2 MG: 2 TABLET ORAL at 21:27

## 2020-01-06 RX ADMIN — Medication 250 MG: at 21:27

## 2020-01-06 RX ADMIN — POLYETHYLENE GLYCOL 3350 17 G: 17 POWDER, FOR SOLUTION ORAL at 09:15

## 2020-01-06 RX ADMIN — DIAZEPAM 2 MG: 2 TABLET ORAL at 13:18

## 2020-01-06 RX ADMIN — IPRATROPIUM BROMIDE AND ALBUTEROL SULFATE 3 ML: 2.5; .5 SOLUTION RESPIRATORY (INHALATION) at 14:30

## 2020-01-06 RX ADMIN — FLUTICASONE PROPIONATE 2 SPRAY: 50 SPRAY, METERED NASAL at 05:07

## 2020-01-06 RX ADMIN — Medication 250 MG: at 09:15

## 2020-01-06 RX ADMIN — METHYLPREDNISOLONE SODIUM SUCCINATE 40 MG: 40 INJECTION, POWDER, LYOPHILIZED, FOR SOLUTION INTRAMUSCULAR; INTRAVENOUS at 18:23

## 2020-01-06 RX ADMIN — GUAIFENESIN 1200 MG: 600 TABLET, EXTENDED RELEASE ORAL at 09:14

## 2020-01-06 RX ADMIN — SODIUM CHLORIDE, PRESERVATIVE FREE 10 ML: 5 INJECTION INTRAVENOUS at 09:16

## 2020-01-06 RX ADMIN — POTASSIUM CHLORIDE 40 MEQ: 750 CAPSULE, EXTENDED RELEASE ORAL at 05:35

## 2020-01-06 RX ADMIN — METHOCARBAMOL 500 MG: 500 TABLET ORAL at 13:18

## 2020-01-06 RX ADMIN — SODIUM CHLORIDE, PRESERVATIVE FREE 10 ML: 5 INJECTION INTRAVENOUS at 21:33

## 2020-01-06 RX ADMIN — GUAIFENESIN 1200 MG: 600 TABLET, EXTENDED RELEASE ORAL at 21:27

## 2020-01-06 RX ADMIN — DIAZEPAM 2 MG: 2 TABLET ORAL at 05:07

## 2020-01-06 RX ADMIN — IPRATROPIUM BROMIDE AND ALBUTEROL SULFATE 3 ML: 2.5; .5 SOLUTION RESPIRATORY (INHALATION) at 09:52

## 2020-01-06 RX ADMIN — METHOCARBAMOL 500 MG: 500 TABLET ORAL at 05:08

## 2020-01-06 RX ADMIN — ENOXAPARIN SODIUM 80 MG: 80 INJECTION SUBCUTANEOUS at 09:15

## 2020-01-06 RX ADMIN — METOPROLOL TARTRATE 50 MG: 50 TABLET, FILM COATED ORAL at 09:15

## 2020-01-06 RX ADMIN — IPRATROPIUM BROMIDE AND ALBUTEROL SULFATE 3 ML: 2.5; .5 SOLUTION RESPIRATORY (INHALATION) at 02:31

## 2020-01-06 RX ADMIN — DOCUSATE SODIUM 100 MG: 100 CAPSULE, LIQUID FILLED ORAL at 09:15

## 2020-01-06 RX ADMIN — ACETAMINOPHEN 650 MG: 325 TABLET, FILM COATED ORAL at 14:17

## 2020-01-06 RX ADMIN — FUROSEMIDE 40 MG: 10 INJECTION, SOLUTION INTRAVENOUS at 09:15

## 2020-01-06 RX ADMIN — METHYLPREDNISOLONE SODIUM SUCCINATE 40 MG: 40 INJECTION, POWDER, LYOPHILIZED, FOR SOLUTION INTRAMUSCULAR; INTRAVENOUS at 05:08

## 2020-01-06 RX ADMIN — HYDROCODONE BITARTRATE AND ACETAMINOPHEN 1 TABLET: 10; 325 TABLET ORAL at 20:23

## 2020-01-06 RX ADMIN — FUROSEMIDE 40 MG: 10 INJECTION, SOLUTION INTRAVENOUS at 18:23

## 2020-01-06 RX ADMIN — Medication 100 MG: at 09:15

## 2020-01-06 RX ADMIN — METOPROLOL TARTRATE 50 MG: 50 TABLET, FILM COATED ORAL at 21:28

## 2020-01-06 RX ADMIN — IPRATROPIUM BROMIDE AND ALBUTEROL SULFATE 3 ML: 2.5; .5 SOLUTION RESPIRATORY (INHALATION) at 19:05

## 2020-01-06 RX ADMIN — NICOTINE 1 PATCH: 21 PATCH, EXTENDED RELEASE TRANSDERMAL at 09:16

## 2020-01-06 NOTE — PLAN OF CARE
Problem: Patient Care Overview  Goal: Plan of Care Review  Outcome: Ongoing (interventions implemented as appropriate)  Flowsheets (Taken 1/6/2020 9931)  Plan of Care Reviewed With: patient  Outcome Summary: Pt. has good bed mobility. Stands and sits with [place] by assist. Ambulated with RWX and CGA for 200'. Actively worked thru LE ex's. SAt on 5LPm 90-92%. Will benefit from strengthening and increased activity.

## 2020-01-06 NOTE — PROGRESS NOTES
"1           [facility] Medicine Services  INPATIENT PROGRESS NOTE    Patient Name: Sascha Oden Sr.  Date of Admission: 12/29/2019  Today's Date: 01/06/20  Length of Stay: 8  Primary Care Physician: Narendra Camara MD    Subjective   Chief Complaint: Follow-up  HPI   59-year-old man on day 8 of hospitalization.  He presented with complaint of \"syncope\".  He carries past medical history of alcohol abuse, COPD/emphysema and peripheral vascular disease.    When he first came in he was hyponatremic  His CK was elevated at 1281  He was hypoxic with PaO2 of 51.5 on 3 L nasal cannula  He was tested positive for influenza A and had been treated with Tamiflu.  Urine drug screen is negative  Ethanol is positive    Currently his problem list as of yesterday includes  Acute respiratory failure with hypoxia  Syncope collapse  COPD with exacerbation  Acute on chronic diastolic heart failure  Hyponatremia  Influenza A  Paroxysmal atrial fibrillation -history of alcoholism, fall risks.  Community-acquired pneumonia  (?)  Alcohol abuse  Thrombocytopenia  Hypokalemia    He remains afebrile.  He is hemodynamically stable.  His oxygen saturation is 95% on nasal cannula.  Spoke with overnight nurse (Sushant) -oxygenation has been borderline.    From the CT scan of chest with contrast yesterday he was found with advanced emphysema, lingular and bilateral lobe opacities worrisome for pneumonia and or patchy atelectasis.  There is no pneumothorax.  He has moderate coronary artery calcification.  There is no evidence of pulmonary emboli discretely.  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  Despite the flu he has not had any febrile illness.  Procalcitonin is less than 0.15  He has been receiving antibiotics  \" I feel good.  I'm breathing a lot better\"  States that he does not normally uses oxygen  He smoked 1 pack/day prior to admission  States that he has prior history of " stents peripherally placed by Dr. Juarez  Review of Systems     All pertinent negatives and positives are as above. All other systems have been reviewed and are negative unless otherwise stated.     Objective    Temp:  [97.6 °F (36.4 °C)-98.4 °F (36.9 °C)] 98.2 °F (36.8 °C)  Heart Rate:  [] 87  Resp:  [10-22] 18  BP: ()/(55-91) 106/69  Physical Exam   He is appears to be hemodynamically stable  Not in any distress  GEN: Awake, alert, interactive, in NAD.    HEENT: PERRLA, EOMI, Anicteric, Trachea midline  Lungs: diminished but good air entry/flow, no wheezing.  Still some fine rales -this is no longer audible with stethoscope ; barrel chested  heart: RRR, +S1/s2, no rub; telemetry showed occasional PAC and PVC but maintaining sinus rhythm with appropriate rate  ABD: soft, nt/nd, +BS, no guarding/rebound  Extremities: atraumatic, no cyanosis, no peripheral edema  Noticeable loss of muscle mass of lower extremities  Skin: no rashes or lesions  Neuro: AAOx3, no focal deficits  Psych: normal mood & affect         Results Review:  I have reviewed the labs, radiology results, and diagnostic studies.    Laboratory Data:   Results from last 7 days   Lab Units 01/05/20  0231 01/04/20  0250 01/03/20  0351   WBC 10*3/mm3 10.11 9.70 3.93   HEMOGLOBIN g/dL 15.3 15.0 15.2   HEMATOCRIT % 43.0 42.0 42.5   PLATELETS 10*3/mm3 246 174 112*        Results from last 7 days   Lab Units 01/06/20  0325 01/05/20  0231 01/04/20  0249  01/03/20  0351   SODIUM mmol/L 137 137 137  --  136   POTASSIUM mmol/L 4.2 3.4* 4.0   < > 3.3*   CHLORIDE mmol/L 97* 95* 97*  --  96*   CO2 mmol/L 28.0 30.0* 28.0  --  29.0   BUN mg/dL 15 17 15  --  12   CREATININE mg/dL 0.73* 0.61* 0.60*  --  0.65*   CALCIUM mg/dL 9.0 9.1 9.4  --  8.9   BILIRUBIN mg/dL  --   --  0.3  --  0.2   ALK PHOS U/L  --   --  52  --  55   ALT (SGPT) U/L  --   --  54*  --  47*   AST (SGOT) U/L  --   --  55*  --  67*   GLUCOSE mg/dL 176* 125* 128*  --  169*    < > = values in  this interval not displayed.       Culture Data:   Blood Culture   Date Value Ref Range Status   12/30/2019 No growth at 5 days  Final   12/30/2019 No growth at 5 days  Final     Respiratory Culture   Date Value Ref Range Status   12/30/2019 Light growth (2+) Normal Respiratory [place]  Final       Radiology Data:   Imaging Results (Last 24 Hours)     Procedure Component Value Units Date/Time    CT Angiogram Chest With & Without Contrast [[id number]] Collected:  01/05/20 1233     Updated:  01/05/20 1246    Narrative:       CT ANGIOGRAM CHEST W WO CONTRAST- 1/5/2020 12:20 PM CST     HISTORY: hypoxic RF w/o significant improvement despite interventions,  elevated d-dimer; T79.6XXA-Traumatic ischemia of muscle, initial  encounter; E87.3-Hkrm-toriiiipyg and hyponatremia; R13.10-Dysphagia,  unspecified; Z74.09-Other reduced mobility; Z74.09-Other reduced  mobility      COMPARISON: 11/03/2016     DOSE LENGTH PRODUCT: 427 mGy cm. Automated exposure control was also  utilized to decrease patient radiation dose.     TECHNIQUE: Axial images the chest are obtained following IV contrast.  2-D and maximal intensity projection images reconstructed and reviewed.  Contrast bolus suboptimal, perhaps related to the SVC stenosis.     FINDINGS:  There is a heterogeneous appearance to the contrast within  the right ventricle as well as main pulmonary artery is which is more  favorable for mixing of contrast with nonopacified blood. A discrete  pulmonary embolus is not visualized. The superior vena cava is slitlike  in appearance with no extrinsic compression which may represent an SVC  stenosis. There is moderate coronary artery calcification. Scattered  atherosclerotic calcification but no thoracic aortic aneurysm or  dissection. There is trace pericardial fluid. There are no pleural  effusions. No pathologic intrathoracic or axillary lymphadenopathy is  visualized.     There is elevation left hemidiaphragm. Adrenal glands not imaged  in  their entirety. No acute pathology seen within the upper abdomen.     There are advanced emphysematous changes of the lungs. There are  lingular and bilateral lower lobe opacities concerning for pneumonia.  There is no pneumothorax. No endobronchial lesion identified.     Old right clavicle fracture. There is chronic compression of mid  thoracic vertebra.       Impression:       1. 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  Heterogeneous attenuation of the contrast in the right ventricle as well  as the main pulmonary arteries likely due to mixing of contrast with  nonopacified blood. No discrete pulmonary emboli.   2. Advanced emphysema..  3. Lingular and bilateral lower lobe opacities worrisome for pneumonia  and/or patchy atelectasis. No pneumothorax.  4. Moderate coronary artery calcification.  This report was finalized on 01/05/2020 12:42 by Dr. Abbie Velez MD.          I have reviewed the patient's current medications.     Assessment/Plan     Active Hospital Problems    Diagnosis   • Acute respiratory failure with hypoxia (CMS/HCC)   • Paroxysmal atrial fibrillation with rapid ventricular response (CMS/HCC)   • Hypokalemia   • Influenza A   • Hyponatremia   • Alcoholism /alcohol abuse (CMS/HCC)   • COPD with acute exacerbation (CMS/HCC)   • CAP (community acquired pneumonia)   • Thrombocytopenia (CMS/HCC)   • Syncope and collapse   • Rhabdomyolysis   • Hypertension         #1 acute respiratory failure with hypoxia.  Had a PO2 of 39.1 on 6 L nasal cannula 1/1.  Suspect multifactorial from COPD, influenza, likely acute on chronic diastolic heart failure.  Initial imaging on arrival had mentioned x-ray with possible pneumonia.  However patient's never had a white count is worsened with fluids.  Mild BNP elevation.  Procalcitonin is 0.15.  Suspect he worsened in the setting of P A. fib and volume/pulm edema.  He has gotten better clinically over the last 72 hours with diuretics  and steroids and cxr done 1/4 with  improving pulmonary edema.  Despite this patient is still on 10L high flow and not weaning. He was not on lovenox first few days due to thrombocytopenia, he is already on full dose lovenox now last 48 hrs due to afib runs. Will get d-dimer, if neg will do ct chest w/o, if positive CTA to make sure we aren't missing some other process. Finishing 7 day course of levaquin today. Wean O2 as able.  Continue nebulizers and steroids.     #2 syncope and collapse -prior to arrival.  Patient is an alcoholic and had been drinking.  He was hyponatremic on arrival.  CT head was nonacute.  2D echo showed diastolic dysfunction otherwise normal.  He had had no arrhythmias for several days until he began having runs of A. fib RVR on 1/1.  Could potentially be etiology as well but less likely.  He has had no further syncope since arrival. Again... ? PE, will f/u d-dimer.  Patient has no gross evidence of PE.  Atrial fibrillation rhythm changed to normal sinus rhythm with PAC PVC occasionally     #3 COPD -with exacerbation.  Likely in the setting of influenza.  Steroid dosing increased 1/2 with improvement in aeration.  COPD little better.  Will decrease to 40 iv q12 hrs today.  Continue nebulizers and supportive care.  Wean down oxygen as tolerated.  I anticipate that patient may need oxygen upon discharge.  He still is running between 88 to 89% on 5 L but feeling better.  Increase activities as tolerated     #4 acute on chronic diastolic chf - doing well with lasix, now -5L for stay, cxr improved. Renal function stable. Continue diuresis.  Patient feels improved in terms of his breathing.  I agreed likely that patient has CHF that responded well to Lasix.  Echocardiogram showed normal LV systolic function with estimated ejection fraction between 56 to 60%; no significant valvular dysfunction; left atrial cavity size is mildly dilated.     #5 hyponatremia  -on arrival.  Patient was slowly hydrated  and was improving slowly day by day from 120 on arrival to 130.  Fluids were stopped and after diuretics he is now 137.  Stable     #6 influenza A  -completed Tamiflu, doing better, afebrile, cough resolved.        #7 paroxysmal A. Fib - patient went A. fib RVR at 160 on 1/1.  Was given 5 IV Lopressor and returned to sinus rhythm.  His oral Lopressor was increased from 12.5 twice daily 25 twice daily.  He had another brief run after arrival to the ICU with Cardizem.  Has been primarily sinus since with frequent PACs.  Some occasional short runs of A. fib.  Patient states he has had A. fib in the past but I cannot find in the records.  He is not noted to be on a blood thinner prior to hospitalization. ? due to concerns for fall risk and alcoholism.  He denies any life-threatening bleeds or brain bleeds.  Currently on Lovenox 1 mg/kg twice a day today for now and will need to decide on oral AC prior to d/c. Continue Lopressor to 50 twice daily. His ChadVasc2 score is 2 .9 risk of stroke/TIA/systemic embolism.  Patient has history of fall and alcoholism.  He is currently on Lovenox 1 mg/kg.  In the setting of alcoholism, he is at risk of holiday heart syndrome/paroxysmal atrial fibrillation.  I will need to have a feel more about his risk of bleeding, reliability of medication intake and weigh risk-benefit with him       #8 ?CAP -on arrival chest x-ray was suspicious for pneumonia.  He had no white count or fever.  Did have the flu.  He has been on antibiotics since arrival and his procalcitonin is 0.15.  His x-ray has not improved and his respiratory status worsened despite antibiotics.  Suspect this may have been fluid all along.  We will continue antibiotics however and treat for 7 days total.  CT angiogram of the chest reviewed.  Patient completed oral Levaquin 750 mg     #9 Rhabdomyolysis -in the setting of influenza and syncope/fall.  He denies any muscle aches or pains.  Was down from 1300 to 900 since arrival  with IV fluids however he a had worsening respiratory status and appeared to be in some volume overload.  Fluids were stopped and diuretics started.  CK improved under 500.  Renal function stable.  Last CK dated back to January 3 was 428     #9 alcohol abuse -patient with history of alcohol abuse.  He has been on CIWA scores here and doing well with no signs of active withdrawal.  D/c ciwa scale and prn ativan today as he has not needed it. Continue multivitamin, thiamine, and folate.  No evidence of alcohol withdrawal     #10 thrombocytopenia -likely in the setting of alcohol abuse and bone marrow suppression.  Could also have been in the setting of viral illness/influenza. No signs of active bleeding.  No need for transfusion.  Counts now normal last 48 hours and tolerating lovenox.  Platelets remain stable     #11 hypokalemia - replace by protocol, recheck in AM along w/ mag; potassium is now corrected and magnesium is within normal range.          Discussed with nurse Sullivan.    Discharge Planning: If remain hemodynamically stable anticipate that he can be moved to regular floor by tomorrow.  Continue on physical rehabilitation  Man Luis MD   01/06/20   7:18 AM

## 2020-01-06 NOTE — PLAN OF CARE
Problem: Patient Care Overview  Goal: Plan of Care Review  Outcome: Ongoing (interventions implemented as appropriate)  Goal: Individualization and Mutuality  Outcome: Ongoing (interventions implemented as appropriate)  Goal: Discharge Needs Assessment  Outcome: Ongoing (interventions implemented as appropriate)  Goal: Interprofessional Rounds/Family Conf  Outcome: Ongoing (interventions implemented as appropriate)     Problem: Fall Risk (Adult)  Goal: Absence of Fall  Outcome: Ongoing (interventions implemented as appropriate)     Problem: Pain, Chronic (Adult)  Goal: Acceptable Pain/Comfort Level and Functional Ability  Outcome: Ongoing (interventions implemented as appropriate)     Problem: Alcohol Withdrawal Acute, Risk/Actual (Adult)  Goal: Signs and Symptoms of Listed Potential Problems Will be Absent, Minimized or Managed (Alcohol Withdrawal Acute, Risk/Actual)  Outcome: Ongoing (interventions implemented as appropriate)     Problem: Syncope (Adult)  Goal: Physical Safety/Health Maintenance  Outcome: Ongoing (interventions implemented as appropriate)  Goal: Optimal Emotional/Functional [place]  Outcome: Ongoing (interventions implemented as appropriate)     Problem: Chronic Obstructive Pulmonary Disease (Adult)  Goal: Signs and Symptoms of Listed Potential Problems Will be Absent, Minimized or Managed (Chronic Obstructive Pulmonary Disease)  Outcome: Ongoing (interventions implemented as appropriate)     Problem: Infection, Risk/Actual (Adult)  Goal: Infection Prevention/Resolution  Outcome: Ongoing (interventions implemented as appropriate)     Problem: Skin Injury Risk (Adult)  Goal: Identify Related Risk Factors and Signs and Symptoms  Outcome: Ongoing (interventions implemented as appropriate)  Goal: Skin Health and Integrity  Outcome: Ongoing (interventions implemented as appropriate)

## 2020-01-06 NOTE — PLAN OF CARE
Problem: Patient Care Overview  Goal: Plan of Care Review  Outcome: Ongoing (interventions implemented as appropriate)  Flowsheets (Taken 1/6/2020 1412)  Plan of Care Reviewed With: patient  Outcome Summary: Pt reports appetite fair at this time. Oral intake avg 37.5% of past 8 meals. Cardiac diet. Boost Plus BID. Encouraged oral inake with meals. Con to follow.

## 2020-01-06 NOTE — PAYOR COMM NOTE
"Sascha John MARY KAY Sr. (59 y.o. Male)     Date of Birth Social Security Number Address Home Phone MRN    1960  [id number] [facility] [address] [phone] [id number]    [employer] Marital Status          [place]        Admission Date Admission Type Admitting Provider Attending Provider Department, Room/Bed    12/29/19 Emergency Man Luis MD Puertollano, Glenn Riego, MD [facility] INTENSIVE CARE, I004/1    Discharge Date Discharge Disposition Discharge Destination                       Attending Provider:  Man Luis MD    Allergies:  Codeine    Isolation:  Droplet   Infection:  Influenza (12/30/19)   Code Status:  CPR    Ht:  193 cm (75.98\")   Wt:  78.3 kg (172 lb 9.9 oz)    Admission Cmt:  None   Principal Problem:  None                Active Insurance as of 12/29/2019     Primary Coverage     Payor Plan Insurance Group Employer/Plan Group    [employer] [place] [employer] [place]ICA[place]      Payor Plan Address Payor Plan Phone Number Payor Plan Fax Number Effective Dates    [address] [address] [phone]  8/27/2016 - None Entered    [facility] [address]       Subscriber Name Subscriber Birth Date Member ID       SASCHA JOHN SR. 1960 [id number]                 Emergency Contacts      (Rel.) Home Phone Work Phone Mobile Phone    SONIA VALLEJO (Sister) -- -- [phone]              Facility-Administered Medications as of 1/6/2020   Medication Dose Route Frequency Provider Last Rate Last Dose   • acetaminophen (TYLENOL) tablet 650 mg  650 mg Oral Q4H PRN Del Park DO   650 mg at 01/05/20 1641    Or   • acetaminophen (TYLENOL) suppository 650 mg  650 mg Rectal Q4H PRN Del Park DO       • atorvastatin (LIPITOR) tablet 10 mg  10 mg Oral Daily Del Park DO   10 mg at 01/05/20 0913   • benzonatate (TESSALON) capsule 200 mg  200 mg Oral TID PRN Del Park DO       • bisacodyl (DULCOLAX) suppository 10 mg  10 mg Rectal Daily PRN " Del Park DO       • clopidogrel (PLAVIX) tablet 75 mg  75 mg Oral Daily Del Park DO   75 mg at 01/05/20 0913   • diazePAM (VALIUM) tablet 2 mg  2 mg Oral Q8H Del Park DO   2 mg at 01/06/20 0507   • [COMPLETED] dilTIAZem (CARDIZEM) injection 20 mg  20 mg Intravenous Once Gustavo Lang MD   20 mg at 01/01/20 2046   • docusate sodium (COLACE) capsule 100 mg  100 mg Oral BID Del Park DO   100 mg at 01/05/20 2048   • enoxaparin (LOVENOX) syringe 80 mg  1 mg/kg Subcutaneous Q12H Del Park DO   80 mg at 01/05/20 2047   • fluticasone (FLONASE) 50 MCG/ACT nasal spray 2 spray  2 spray Each Nare BID Gustavo Lang MD   2 spray at 01/06/20 0507   • [COMPLETED] furosemide (LASIX) injection 40 mg  40 mg Intravenous Once Agatha Gottlieb APRN   40 mg at 01/01/20 1817   • furosemide (LASIX) injection 40 mg  40 mg Intravenous BID Del Park DO   40 mg at 01/05/20 0914   • guaiFENesin (MUCINEX) 12 hr tablet 1,200 mg  1,200 mg Oral Q12H Del Park DO   1,200 mg at 01/05/20 2048   • HYDROcodone-acetaminophen (NORCO)  MG per tablet 1 tablet  1 tablet Oral Q6H PRN Del Park DO   1 tablet at 01/05/20 1933   • Influenza Vac Subunit Quad (FLUCELVAX) injection 0.5 mL  0.5 mL Intramuscular During Hospitalization Del Park DO       • [COMPLETED] iopamidol (ISOVUE-370) 76 % injection 125 mL  125 mL Intravenous Once in imaging Del Park DO   125 mL at 01/05/20 1217   • ipratropium-albuterol (DUO-NEB) nebulizer solution 3 mL  3 mL Nebulization Q4H PRN Del Park DO       • ipratropium-albuterol (DUO-NEB) nebulizer solution 3 mL  3 mL Nebulization Q4H - RT Del Park DO   3 mL at 01/06/20 0605   • [COMPLETED] levoFLOXacin (LEVAQUIN) tablet 750 mg  750 mg Oral Q24H Del Park DO   750 mg at 01/05/20 1255   • methocarbamol (ROBAXIN) tablet 500 mg  500 mg Oral Q8H Del Park DO   500 mg at 01/06/20 0508   • methylPREDNISolone  sodium succinate (SOLU-Medrol) injection 40 mg  40 mg Intravenous Q12H Del Park DO   40 mg at 01/06/20 0508   • [COMPLETED] metoprolol tartrate (LOPRESSOR) injection 5 mg  5 mg Intravenous Once Agatha Gottlieb APRN   5 mg at 01/01/20 1711   • metoprolol tartrate (LOPRESSOR) tablet 50 mg  50 mg Oral Q12H Del Park DO   50 mg at 01/05/20 2047   • [COMPLETED] morphine injection 4 mg  4 mg Intravenous Once Jero Salinas MD   4 mg at 12/29/19 2018   • multivitamin w/minerals tablet 1 tablet  1 tablet Oral Daily Del Park DO   1 tablet at 01/05/20 0914   • nicotine (NICODERM CQ) 21 MG/24HR patch 1 patch  1 patch Transdermal Daily Del Park DO   1 patch at 01/05/20 0913   • nitroglycerin (NITROSTAT) SL tablet 0.4 mg  0.4 mg Sublingual Q5 Min PRN Del Park DO       • [COMPLETED] ondansetron (ZOFRAN) injection 4 mg  4 mg Intravenous Once Jero Salinas MD   4 mg at 12/29/19 2019   • ondansetron (ZOFRAN) tablet 4 mg  4 mg Oral Q6H PRN Del Park DO        Or   • ondansetron (ZOFRAN) injection 4 mg  4 mg Intravenous Q6H PRN Del Park DO       • [COMPLETED] oseltamivir (TAMIFLU) capsule 75 mg  75 mg Oral Q12H Del Park DO   75 mg at 01/03/20 0848   • [COMPLETED] perflutren (DEFINITY) lipid microspheres injection 8.476 mg  1.3 mL Intravenous Once Del Park DO   8.476 mg at 12/30/19 1631   • polyethylene glycol 3350 powder (packet)  17 g Oral Daily Del Park DO   17 g at 01/05/20 0914   • potassium chloride (MICRO-K) CR capsule 40 mEq  40 mEq Oral PRN Del Park DO   40 mEq at 01/06/20 0535    Or   • potassium chloride (KLOR-CON) packet 40 mEq  40 mEq Oral PRN Del Park        • [COMPLETED] potassium chloride (MICRO-K) CR capsule 40 mEq  40 mEq Oral Once Agatha Gottlieb APRN   40 mEq at 01/01/20 1540   • [COMPLETED] potassium chloride (MICRO-K) CR capsule 40 mEq  40 mEq Oral Once Del Park DO   40 mEq at 01/02/20  0947   • saccharomyces boulardii (FLORASTOR) capsule 250 mg  250 mg Oral BID Del Park DO   250 mg at 01/05/20 2048   • [COMPLETED] sodium chloride 0.9 % bolus 1,000 mL  1,000 mL Intravenous Once Jero Salinas MD   Stopped at 12/29/19 2311   • sodium chloride 0.9 % flush 10 mL  10 mL Intravenous Q12H Del Park DO   10 mL at 01/05/20 2116   • sodium chloride 0.9 % flush 10 mL  10 mL Intravenous PRN Del Park DO       • [COMPLETED] thiamine (B-1) 100 mg in sodium chloride 0.9 % 100 mL IVPB  100 mg Intravenous Once Gustavo Lang MD        Or   • [COMPLETED] thiamine (VITAMIN B-1) tablet 100 mg  100 mg Oral Once Gustavo Lang MD   100 mg at 12/30/19 0242   • thiamine (B-1) 100 mg in sodium chloride 0.9 % 100 mL IVPB  100 mg Intravenous Daily Del Park DO        Or   • thiamine (VITAMIN B-1) tablet 100 mg  100 mg Oral Daily Del Park DO   100 mg at 01/05/20 0914     Lab Results (last 24 hours)     Procedure Component Value Units Date/Time    Magnesium [[id number]]  (Normal) Collected:  01/06/20 0325    Specimen:  Blood Updated:  01/06/20 0404     Magnesium 2.4 mg/dL     Basic Metabolic Panel [[id number]]  (Abnormal) Collected:  01/06/20 0325    Specimen:  Blood Updated:  01/06/20 0401     Glucose 176 mg/dL      BUN 15 mg/dL      Creatinine 0.73 mg/dL      Sodium 137 mmol/L      Potassium 4.2 mmol/L      Chloride 97 mmol/L      CO2 28.0 mmol/L      Calcium 9.0 mg/dL      eGFR Non African Amer 111 mL/min/1.73      BUN/Creatinine Ratio 20.5     Anion Gap 12.0 mmol/L     Narrative:       GFR Normal >60  Chronic Kidney Disease <60  Kidney Failure <15      Phosphorus [[id number]]  (Normal) Collected:  01/06/20 0325    Specimen:  Blood Updated:  01/06/20 0401     Phosphorus 4.2 mg/dL     D-dimer, Quantitative [[id number]]  (Abnormal) Collected:  01/05/20 0921    Specimen:  Blood Updated:  01/05/20 0957     D-Dimer, Quantitative 1.22 mg/L (FEU)     Narrative:       Reference  Range is 0-0.50 mg/L FEU. However, results <0.50 mg/L FEU tends to rule out DVT or PE. Results >0.50 mg/L FEU are not useful in predicting absence or presence of DVT or PE.          Imaging Results (Last 24 Hours)     Procedure Component Value Units Date/Time    CT Angiogram Chest With & Without Contrast [[id number]] Collected:  01/05/20 1233     Updated:  01/05/20 1246    Narrative:       CT ANGIOGRAM CHEST W WO CONTRAST- 1/5/2020 12:20 PM CST     HISTORY: hypoxic RF w/o significant improvement despite interventions,  elevated d-dimer; T79.6XXA-Traumatic ischemia of muscle, initial  encounter; E87.8-Etoz-fwpsfkgrvw and hyponatremia; R13.10-Dysphagia,  unspecified; Z74.09-Other reduced mobility; Z74.09-Other reduced  mobility      COMPARISON: 11/03/2016     DOSE LENGTH PRODUCT: 427 mGy cm. Automated exposure control was also  utilized to decrease patient radiation dose.     TECHNIQUE: Axial images the chest are obtained following IV contrast.  2-D and maximal intensity projection images reconstructed and reviewed.  Contrast bolus suboptimal, perhaps related to the SVC stenosis.     FINDINGS:  There is a heterogeneous appearance to the contrast within  the right ventricle as well as main pulmonary artery is which is more  favorable for mixing of contrast with nonopacified blood. A discrete  pulmonary embolus is not visualized. The superior vena cava is slitlike  in appearance with no extrinsic compression which may represent an SVC  stenosis. There is moderate coronary artery calcification. Scattered  atherosclerotic calcification but no thoracic aortic aneurysm or  dissection. There is trace pericardial fluid. There are no pleural  effusions. No pathologic intrathoracic or axillary lymphadenopathy is  visualized.     There is elevation left hemidiaphragm. Adrenal glands not imaged in  their entirety. No acute pathology seen within the upper abdomen.     There are advanced emphysematous changes of the lungs. There  are  lingular and bilateral lower lobe opacities concerning for pneumonia.  There is no pneumothorax. No endobronchial lesion identified.     Old right clavicle fracture. There is chronic compression of mid  thoracic vertebra.       Impression:       1. 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  Heterogeneous attenuation of the contrast in the right ventricle as well  as the main pulmonary arteries likely due to mixing of contrast with  nonopacified blood. No discrete pulmonary emboli.   2. Advanced emphysema..  3. Lingular and bilateral lower lobe opacities worrisome for pneumonia  and/or patchy atelectasis. No pneumothorax.  4. Moderate coronary artery calcification.  This report was finalized on 01/05/2020 12:42 by Dr. Abbie Velez MD.        ECG/EMG Results (last 24 hours)     ** No results found for the last 24 hours. **        Orders (last 24 hrs)      Start     Ordered    01/06/20 0755  Increase activities as tolerated; keep on the chair except sleeping.  Ambulate in the hallway.  Wean down to lowest FiO2 tolerated to maintain oxygen saturation greater than 90%  Nursing Communication  Continuous     Comments:  Increase activities as tolerated; keep on the chair except sleeping.  Ambulate in the hallway.  Wean down to lowest FiO2 tolerated to maintain oxygen saturation greater than 90%    01/06/20 0755    01/06/20 0600  Basic Metabolic Panel  Morning Draw      01/05/20 0928    01/06/20 0600  Magnesium  Morning Draw      01/05/20 0928    01/06/20 0600  Phosphorus  Morning Draw      01/05/20 0928    01/06/20 0500  fluticasone (FLONASE) 50 MCG/ACT nasal spray 2 spray  2 Times Daily      01/06/20 0412    01/05/20 1315  iopamidol (ISOVUE-370) 76 % injection 125 mL  Once in Imaging      01/05/20 1229    01/05/20 1200  CT Angiogram Chest With & Without Contrast  1 Time Imaging,   Status:  Canceled      01/05/20 1202    01/05/20 1130  ipratropium-albuterol (DUO-NEB) nebulizer solution 3 mL   Every 4 Hours - RT      01/05/20 0928    01/05/20 1038  CT Angiogram Chest With & Without Contrast  1 Time Imaging      01/05/20 1038    01/05/20 0909  D-dimer, Quantitative  STAT      01/05/20 0908    01/04/20 1800  methylPREDNISolone sodium succinate (SOLU-Medrol) injection 40 mg  Every 12 Hours      01/04/20 1006    01/04/20 1100  docusate sodium (COLACE) capsule 100 mg  2 Times Daily      01/04/20 1005    01/04/20 1100  polyethylene glycol 3350 powder (packet)  Daily      01/04/20 1005    01/04/20 1005  bisacodyl (DULCOLAX) suppository 10 mg  Daily PRN      01/04/20 1005    01/03/20 1800  Dietary Nutrition Supplements Boost Plus (Ensure Enlive, Ensure Plus); chocolate  Daily With Breakfast & Dinner      01/03/20 1324    01/03/20 1430  ipratropium-albuterol (DUO-NEB) nebulizer solution 3 mL  3 Times Daily - RT,   Status:  Discontinued      01/03/20 1108    01/03/20 0915  metoprolol tartrate (LOPRESSOR) tablet 50 mg  Every 12 Hours Scheduled      01/03/20 0826    01/03/20 0915  enoxaparin (LOVENOX) syringe 80 mg  Every 12 Hours      01/03/20 0829    01/03/20 0748  potassium chloride (MICRO-K) CR capsule 40 mEq  As Needed      01/03/20 0748    01/03/20 0748  potassium chloride (KLOR-CON) packet 40 mEq  As Needed      01/03/20 0748    01/03/20 0748  potassium chloride 10 mEq in 100 mL IVPB  Every 1 Hour PRN,   Status:  Discontinued      01/03/20 0748    01/02/20 1200  levoFLOXacin (LEVAQUIN) tablet 750 mg  Every 24 Hours      01/01/20 1627    01/02/20 0930  furosemide (LASIX) injection 40 mg  2 Times Daily (Diuretics)      01/02/20 0832    12/31/19 0900  thiamine (B-1) 100 mg in sodium chloride 0.9 % 100 mL IVPB  Daily      12/30/19 1124    12/31/19 0900  thiamine (VITAMIN B-1) tablet 100 mg  Daily      12/30/19 1124    12/30/19 1400  diazePAM (VALIUM) tablet 2 mg  Every 8 Hours Scheduled      12/30/19 1124    12/30/19 1230  saccharomyces boulardii (FLORASTOR) capsule 250 mg  2 Times Daily      12/30/19 1137     12/30/19 1215  multivitamin w/minerals tablet 1 tablet  Daily      12/30/19 1124    12/30/19 0900  atorvastatin (LIPITOR) tablet 10 mg  Daily      12/30/19 0147 12/30/19 0900  clopidogrel (PLAVIX) tablet 75 mg  Daily      12/30/19 0147    12/30/19 0600  methocarbamol (ROBAXIN) tablet 500 mg  Every 8 Hours Scheduled      12/30/19 0147    12/30/19 0309  ipratropium-albuterol (DUO-NEB) nebulizer solution 3 mL  Every 4 Hours PRN      12/30/19 0309    12/30/19 0245  sodium chloride 0.9 % flush 10 mL  Every 12 Hours Scheduled      12/30/19 0147 12/30/19 0245  guaiFENesin (MUCINEX) 12 hr tablet 1,200 mg  Every 12 Hours Scheduled      12/30/19 0147 12/30/19 0215  nicotine (NICODERM CQ) 21 MG/24HR patch 1 patch  Daily      12/30/19 0147 12/30/19 0143  benzonatate (TESSALON) capsule 200 mg  3 Times Daily PRN      12/30/19 0147    12/30/19 0143  ondansetron (ZOFRAN) tablet 4 mg  Every 6 Hours PRN      12/30/19 0147 12/30/19 0143  ondansetron (ZOFRAN) injection 4 mg  Every 6 Hours PRN      12/30/19 0147 12/30/19 0143  acetaminophen (TYLENOL) tablet 650 mg  Every 4 Hours PRN      12/30/19 0147 12/30/19 0143  acetaminophen (TYLENOL) suppository 650 mg  Every 4 Hours PRN      12/30/19 0147 12/30/19 0141  LORazepam (ATIVAN) tablet 1 mg  Every 2 Hours PRN,   Status:  Discontinued      12/30/19 0147 12/30/19 0141  LORazepam (ATIVAN) injection 1 mg  Every 2 Hours PRN,   Status:  Discontinued      12/30/19 0147 12/30/19 0141  LORazepam (ATIVAN) tablet 2 mg  Every 1 Hour PRN,   Status:  Discontinued      12/30/19 0147 12/30/19 0141  LORazepam (ATIVAN) injection 2 mg  Every 1 Hour PRN,   Status:  Discontinued      12/30/19 0147 12/30/19 0141  LORazepam (ATIVAN) injection 2 mg  Every 15 Minutes PRN,   Status:  Discontinued      12/30/19 0147 12/30/19 0141  LORazepam (ATIVAN) injection 2 mg  Every 15 Minutes PRN,   Status:  Discontinued      12/30/19 0147 12/30/19 0141  LORazepam (ATIVAN) tablet 4  mg  Every 1 Hour PRN,   Status:  Discontinued      12/30/19 0147 12/30/19 0141  LORazepam (ATIVAN) injection 4 mg  Every 1 Hour PRN,   Status:  Discontinued      12/30/19 0147 12/30/19 0133  nitroglycerin (NITROSTAT) SL tablet 0.4 mg  Every 5 Minutes PRN      12/30/19 0147 12/30/19 0133  sodium chloride 0.9 % flush 10 mL  As Needed      12/30/19 0147 12/30/19 0132  HYDROcodone-acetaminophen (NORCO)  MG per tablet 1 tablet  Every 6 Hours PRN      12/30/19 0147 12/30/19 0056  Influenza Vac Subunit Quad (FLUCELVAX) injection 0.5 mL  During Hospitalization      12/30/19 0056    Unscheduled  ECG 12 Lead  As Needed     Comments:  Nurse to Release if Patient Expericences Acute Chest Pain or Dysrhythmias    12/30/19 0147    Unscheduled  Oxygen Therapy- Heated High Flow Nasal Cannula; Titrate for SPO2: 90% - 95%  Continuous PRN     Comments:  Vapotherm    01/02/20 2209    Unscheduled  Magnesium  As Needed      01/03/20 0748    Unscheduled  Potassium  As Needed      01/03/20 0748    --  SCANNED - TELEMETRY        12/29/19 0000    --  SCANNED EKG      12/29/19 0000    --  SCANNED - TELEMETRY        12/29/19 0000    --  SCANNED - TELEMETRY        12/29/19 0000    --  SCANNED - TELEMETRY        12/29/19 0000    --  SCANNED - TELEMETRY        12/29/19 0000    --  metoprolol tartrate (LOPRESSOR) 50 MG tablet  Daily      12/31/19 1003    --  SCANNED - TELEMETRY        12/29/19 0000    --  SCANNED - TELEMETRY        12/29/19 0000    --  SCANNED - TELEMETRY        12/29/19 0000                   Physician Progress Notes (last 24 hours) (Notes from 01/05/20 0756 through 01/06/20 0756)      Man Luis MD at 01/06/20 0718          1           [facility] Medicine Services  INPATIENT PROGRESS NOTE    Patient Name: Sascha Oden Sr.  Date of Admission: 12/29/2019  Today's Date: 01/06/20  Length of Stay: 8  Primary Care Physician: Narendra Camara MD    Subjective   Chief  "Complaint: Follow-up  HPI   59-year-old man on day 8 of hospitalization.  He presented with complaint of \"syncope\".  He carries past medical history of alcohol abuse, COPD/emphysema and peripheral vascular disease.    When he first came in he was hyponatremic  His CK was elevated at 1281  He was hypoxic with PaO2 of 51.5 on 3 L nasal cannula  He was tested positive for influenza A and had been treated with Tamiflu.  Urine drug screen is negative  Ethanol is positive    Currently his problem list as of yesterday includes  Acute respiratory failure with hypoxia  Syncope collapse  COPD with exacerbation  Acute on chronic diastolic heart failure  Hyponatremia  Influenza A  Paroxysmal atrial fibrillation -history of alcoholism, fall risks.  Community-acquired pneumonia  (?)  Alcohol abuse  Thrombocytopenia  Hypokalemia    He remains afebrile.  He is hemodynamically stable.  His oxygen saturation is 95% on nasal cannula.  Spoke with overnight nurse (Sushant) -oxygenation has been borderline.    From the CT scan of chest with contrast yesterday he was found with advanced emphysema, lingular and bilateral lobe opacities worrisome for pneumonia and or patchy atelectasis.  There is no pneumothorax.  He has moderate coronary artery calcification.  There is no evidence of pulmonary emboli discretely.  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  Despite the flu he has not had any febrile illness.  Procalcitonin is less than 0.15  He has been receiving antibiotics  \" I feel good.  I'm breathing a lot better\"  States that he does not normally uses oxygen  He smoked 1 pack/day prior to admission  States that he has prior history of stents peripherally placed by Dr. Juarez  Review of Systems     All pertinent negatives and positives are as above. All other systems have been reviewed and are negative unless otherwise stated.     Objective    Temp:  [97.6 °F (36.4 °C)-98.4 °F (36.9 °C)] 98.2 °F (36.8 " °C)  Heart Rate:  [] 87  Resp:  [10-22] 18  BP: ()/(55-91) 106/69  Physical Exam   He is appears to be hemodynamically stable  Not in any distress  GEN: Awake, alert, interactive, in NAD.    HEENT: PERRLA, EOMI, Anicteric, Trachea midline  Lungs: diminished but good air entry/flow, no wheezing.  Still some fine rales -this is no longer audible with stethoscope ; barrel chested  heart: RRR, +S1/s2, no rub; telemetry showed occasional PAC and PVC but maintaining sinus rhythm with appropriate rate  ABD: soft, nt/nd, +BS, no guarding/rebound  Extremities: atraumatic, no cyanosis, no peripheral edema  Noticeable loss of muscle mass of lower extremities  Skin: no rashes or lesions  Neuro: AAOx3, no focal deficits  Psych: normal mood & affect         Results Review:  I have reviewed the labs, radiology results, and diagnostic studies.    Laboratory Data:   Results from last 7 days   Lab Units 01/05/20  0231 01/04/20  0250 01/03/20  0351   WBC 10*3/mm3 10.11 9.70 3.93   HEMOGLOBIN g/dL 15.3 15.0 15.2   HEMATOCRIT % 43.0 42.0 42.5   PLATELETS 10*3/mm3 246 174 112*        Results from last 7 days   Lab Units 01/06/20  0325 01/05/20  0231 01/04/20  0249  01/03/20  0351   SODIUM mmol/L 137 137 137  --  136   POTASSIUM mmol/L 4.2 3.4* 4.0   < > 3.3*   CHLORIDE mmol/L 97* 95* 97*  --  96*   CO2 mmol/L 28.0 30.0* 28.0  --  29.0   BUN mg/dL 15 17 15  --  12   CREATININE mg/dL 0.73* 0.61* 0.60*  --  0.65*   CALCIUM mg/dL 9.0 9.1 9.4  --  8.9   BILIRUBIN mg/dL  --   --  0.3  --  0.2   ALK PHOS U/L  --   --  52  --  55   ALT (SGPT) U/L  --   --  54*  --  47*   AST (SGOT) U/L  --   --  55*  --  67*   GLUCOSE mg/dL 176* 125* 128*  --  169*    < > = values in this interval not displayed.       Culture Data:   Blood Culture   Date Value Ref Range Status   12/30/2019 No growth at 5 days  Final   12/30/2019 No growth at 5 days  Final     Respiratory Culture   Date Value Ref Range Status   12/30/2019 Light growth (2+) Normal  Respiratory [place]  Final       Radiology Data:   Imaging Results (Last 24 Hours)     Procedure Component Value Units Date/Time    CT Angiogram Chest With & Without Contrast [[id number]] Collected:  01/05/20 1233     Updated:  01/05/20 1246    Narrative:       CT ANGIOGRAM CHEST W WO CONTRAST- 1/5/2020 12:20 PM CST     HISTORY: hypoxic RF w/o significant improvement despite interventions,  elevated d-dimer; T79.6XXA-Traumatic ischemia of muscle, initial  encounter; E87.1-Bfwk-zgidcdunym and hyponatremia; R13.10-Dysphagia,  unspecified; Z74.09-Other reduced mobility; Z74.09-Other reduced  mobility      COMPARISON: 11/03/2016     DOSE LENGTH PRODUCT: 427 mGy cm. Automated exposure control was also  utilized to decrease patient radiation dose.     TECHNIQUE: Axial images the chest are obtained following IV contrast.  2-D and maximal intensity projection images reconstructed and reviewed.  Contrast bolus suboptimal, perhaps related to the SVC stenosis.     FINDINGS:  There is a heterogeneous appearance to the contrast within  the right ventricle as well as main pulmonary artery is which is more  favorable for mixing of contrast with nonopacified blood. A discrete  pulmonary embolus is not visualized. The superior vena cava is slitlike  in appearance with no extrinsic compression which may represent an SVC  stenosis. There is moderate coronary artery calcification. Scattered  atherosclerotic calcification but no thoracic aortic aneurysm or  dissection. There is trace pericardial fluid. There are no pleural  effusions. No pathologic intrathoracic or axillary lymphadenopathy is  visualized.     There is elevation left hemidiaphragm. Adrenal glands not imaged in  their entirety. No acute pathology seen within the upper abdomen.     There are advanced emphysematous changes of the lungs. There are  lingular and bilateral lower lobe opacities concerning for pneumonia.  There is no pneumothorax. No endobronchial lesion  identified.     Old right clavicle fracture. There is chronic compression of mid  thoracic vertebra.       Impression:       1. 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  Heterogeneous attenuation of the contrast in the right ventricle as well  as the main pulmonary arteries likely due to mixing of contrast with  nonopacified blood. No discrete pulmonary emboli.   2. Advanced emphysema..  3. Lingular and bilateral lower lobe opacities worrisome for pneumonia  and/or patchy atelectasis. No pneumothorax.  4. Moderate coronary artery calcification.  This report was finalized on 01/05/2020 12:42 by Dr. Abbie Velez MD.          I have reviewed the patient's current medications.     Assessment/Plan     Active Hospital Problems    Diagnosis   • Acute respiratory failure with hypoxia (CMS/HCC)   • Paroxysmal atrial fibrillation with rapid ventricular response (CMS/HCC)   • Hypokalemia   • Influenza A   • Hyponatremia   • Alcoholism /alcohol abuse (CMS/HCC)   • COPD with acute exacerbation (CMS/HCC)   • CAP (community acquired pneumonia)   • Thrombocytopenia (CMS/HCC)   • Syncope and collapse   • Rhabdomyolysis   • Hypertension         #1 acute respiratory failure with hypoxia.  Had a PO2 of 39.1 on 6 L nasal cannula 1/1.  Suspect multifactorial from COPD, influenza, likely acute on chronic diastolic heart failure.  Initial imaging on arrival had mentioned x-ray with possible pneumonia.  However patient's never had a white count is worsened with fluids.  Mild BNP elevation.  Procalcitonin is 0.15.  Suspect he worsened in the setting of P A. fib and volume/pulm edema.  He has gotten better clinically over the last 72 hours with diuretics and steroids and cxr done 1/4 with  improving pulmonary edema.  Despite this patient is still on 10L high flow and not weaning. He was not on lovenox first few days due to thrombocytopenia, he is already on full dose lovenox now last 48 hrs due to afib runs. Will  get d-dimer, if neg will do ct chest w/o, if positive CTA to make sure we aren't missing some other process. Finishing 7 day course of levaquin today. Wean O2 as able.  Continue nebulizers and steroids.     #2 syncope and collapse -prior to arrival.  Patient is an alcoholic and had been drinking.  He was hyponatremic on arrival.  CT head was nonacute.  2D echo showed diastolic dysfunction otherwise normal.  He had had no arrhythmias for several days until he began having runs of A. fib RVR on 1/1.  Could potentially be etiology as well but less likely.  He has had no further syncope since arrival. Again... ? PE, will f/u d-dimer.  Patient has no gross evidence of PE.  Atrial fibrillation rhythm changed to normal sinus rhythm with PAC PVC occasionally     #3 COPD -with exacerbation.  Likely in the setting of influenza.  Steroid dosing increased 1/2 with improvement in aeration.  COPD little better.  Will decrease to 40 iv q12 hrs today.  Continue nebulizers and supportive care.  Wean down oxygen as tolerated.  I anticipate that patient may need oxygen upon discharge.  He still is running between 88 to 89% on 5 L but feeling better.  Increase activities as tolerated     #4 acute on chronic diastolic chf - doing well with lasix, now -5L for stay, cxr improved. Renal function stable. Continue diuresis.  Patient feels improved in terms of his breathing.  I agreed likely that patient has CHF that responded well to Lasix.  Echocardiogram showed normal LV systolic function with estimated ejection fraction between 56 to 60%; no significant valvular dysfunction; left atrial cavity size is mildly dilated.     #5 hyponatremia  -on arrival.  Patient was slowly hydrated and was improving slowly day by day from 120 on arrival to 130.  Fluids were stopped and after diuretics he is now 137.  Stable     #6 influenza A  -completed Tamiflu, doing better, afebrile, cough resolved.        #7 paroxysmal A. Fib - patient went A. fib RVR at  160 on 1/1.  Was given 5 IV Lopressor and returned to sinus rhythm.  His oral Lopressor was increased from 12.5 twice daily 25 twice daily.  He had another brief run after arrival to the ICU with Cardizem.  Has been primarily sinus since with frequent PACs.  Some occasional short runs of A. fib.  Patient states he has had A. fib in the past but I cannot find in the records.  He is not noted to be on a blood thinner prior to hospitalization. ? due to concerns for fall risk and alcoholism.  He denies any life-threatening bleeds or brain bleeds.  Currently on Lovenox 1 mg/kg twice a day today for now and will need to decide on oral AC prior to d/c. Continue Lopressor to 50 twice daily. His ChadVasc2 score is 2 .9 risk of stroke/TIA/systemic embolism.  Patient has history of fall and alcoholism.  He is currently on Lovenox 1 mg/kg.  In the setting of alcoholism, he is at risk of holiday heart syndrome/paroxysmal atrial fibrillation.  I will need to have a feel more about his risk of bleeding, reliability of medication intake and weigh risk-benefit with him       #8 ?CAP -on arrival chest x-ray was suspicious for pneumonia.  He had no white count or fever.  Did have the flu.  He has been on antibiotics since arrival and his procalcitonin is 0.15.  His x-ray has not improved and his respiratory status worsened despite antibiotics.  Suspect this may have been fluid all along.  We will continue antibiotics however and treat for 7 days total.  CT angiogram of the chest reviewed.  Patient completed oral Levaquin 750 mg     #9 Rhabdomyolysis -in the setting of influenza and syncope/fall.  He denies any muscle aches or pains.  Was down from 1300 to 900 since arrival with IV fluids however he a had worsening respiratory status and appeared to be in some volume overload.  Fluids were stopped and diuretics started.  CK improved under 500.  Renal function stable.  Last CK dated back to January 3 was 428     #9 alcohol abuse  -patient with history of alcohol abuse.  He has been on CIWA scores here and doing well with no signs of active withdrawal.  D/c ciwa scale and prn ativan today as he has not needed it. Continue multivitamin, thiamine, and folate.  No evidence of alcohol withdrawal     #10 thrombocytopenia -likely in the setting of alcohol abuse and bone marrow suppression.  Could also have been in the setting of viral illness/influenza. No signs of active bleeding.  No need for transfusion.  Counts now normal last 48 hours and tolerating lovenox.  Platelets remain stable     #11 hypokalemia - replace by protocol, recheck in AM along w/ mag; potassium is now corrected and magnesium is within normal range.          Discussed with nurse Sullivan.    Discharge Planning: If remain hemodynamically stable anticipate that he can be moved to regular floor by tomorrow.  Continue on physical rehabilitation  Man Luis MD   01/06/20   7:18 AM      Electronically signed by Man Luis MD at 01/06/20 0755     Del Park DO at 01/05/20 0850              [facility] Medicine Services  INPATIENT PROGRESS NOTE    Patient Name: Sascha Oden Sr.  Date of Admission: 12/29/2019  Today's Date: 01/05/20  Length of Stay: 7  Primary Care Physician: Narendra Camara MD    Subjective   Chief Complaint: Follow-up respiratory failure    HPI:  Pt seen and examined, feels better, doing better, less cough. Denies chest pain or sob. Still in and out of afib. Still on 10L high flow w/ sp02 89-91%.     Review of Systems   All pertinent negatives and positives are as above. All other systems have been reviewed and are negative unless otherwise stated.     Objective    Temp:  [97.6 °F (36.4 °C)-97.9 °F (36.6 °C)] 97.8 °F (36.6 °C)  Heart Rate:  [] 89  Resp:  [8-17] 15  BP: (104-137)/(76-97) 136/84  Physical Exam   GEN: Awake, alert, interactive, in NAD.  Disheveled.  HEENT: PERRLA, EOMI, Anicteric,  Trachea midline  Lungs: diminished but good air entry/flow, no wheezing.  Still some fine rales.    Heart: RRR, +S1/s2, no rub  ABD: soft, nt/nd, +BS, no guarding/rebound  Extremities: atraumatic, no cyanosis, no peripheral edema  Skin: no rashes or lesions  Neuro: AAOx3, no focal deficits  Psych: normal mood & affect        Results Review:  I have reviewed the labs, radiology results, and diagnostic studies.    Laboratory Data:   Results from last 7 days   Lab Units 01/05/20  0231 01/04/20  0250 01/03/20  0351   WBC 10*3/mm3 10.11 9.70 3.93   HEMOGLOBIN g/dL 15.3 15.0 15.2   HEMATOCRIT % 43.0 42.0 42.5   PLATELETS 10*3/mm3 246 174 112*        Results from last 7 days   Lab Units 01/05/20  0231 01/04/20  0249 01/03/20  1739 01/03/20  0351  12/30/19  0254   SODIUM mmol/L 137 137  --  136   < > 120*   POTASSIUM mmol/L 3.4* 4.0 3.5 3.3*   < > 3.5   CHLORIDE mmol/L 95* 97*  --  96*   < > 83*   CO2 mmol/L 30.0* 28.0  --  29.0   < > 23.0   BUN mg/dL 17 15  --  12   < > 11   CREATININE mg/dL 0.61* 0.60*  --  0.65*   < > 0.53*   CALCIUM mg/dL 9.1 9.4  --  8.9   < > 7.7*   BILIRUBIN mg/dL  --  0.3  --  0.2  --  0.3   ALK PHOS U/L  --  52  --  55  --  50   ALT (SGPT) U/L  --  54*  --  47*  --  66*   AST (SGOT) U/L  --  55*  --  67*  --  132*   GLUCOSE mg/dL 125* 128*  --  169*   < > 111*    < > = values in this interval not displayed.       Culture Data:   Blood Culture   Date Value Ref Range Status   12/30/2019 No growth at 2 days  Preliminary   12/30/2019 No growth at 2 days  Preliminary     Respiratory Culture   Date Value Ref Range Status   12/30/2019 Light growth (2+) Normal Respiratory [place]  Final       Radiology Data:   Imaging Results (Last 24 Hours)     Procedure Component Value Units Date/Time    XR Chest 1 View [[id number]] Collected:  01/04/20 0959     Updated:  01/04/20 1004    Narrative:       EXAMINATION: XR CHEST 1 VW-. 1/4/2020 9:59 AM CST     CHEST, ONE VIEW:     HISTORY: Hypoxia     COMPARISON: 01/01/2020,  12/30/2019 and 8/30/2019     A single frontal chest radiograph was obtained.     FINDINGS:     Decreasing perihilar interstitial infiltration observed with mild  residual.     The heart size also decreasing.     Deformity of the right clavicle observed compatible with old fracture  with malunion.                                     Impression:       1. Decreasing perihilar interstitial infiltrates, improving interstitial  pulmonary edema considered.     This report was finalized on 01/04/2020 10:01 by Dr. Kee Hooks MD.          I have reviewed the patient's current medications.     Assessment/Plan     Active Hospital Problems    Diagnosis   • Acute respiratory failure with hypoxia (CMS/HCC)   • Paroxysmal atrial fibrillation with rapid ventricular response (CMS/HCC)   • Hypokalemia   • Influenza A   • Hyponatremia   • Alcoholism /alcohol abuse (CMS/HCC)   • COPD with acute exacerbation (CMS/HCC)   • CAP (community acquired pneumonia)   • Thrombocytopenia (CMS/HCC)   • Syncope and collapse   • Rhabdomyolysis   • Hypertension       #1 acute respiratory failure with hypoxia.  Had a PO2 of 39.1 on 6 L nasal cannula 1/1.  Suspect multifactorial from COPD, influenza, likely acute on chronic diastolic heart failure.  Initial imaging on arrival had mentioned x-ray with possible pneumonia.  However patient's never had a white count is worsened with fluids.  Mild BNP elevation.  Procalcitonin is 0.15.  Suspect he worsened in the setting of P A. fib and volume/pulm edema.  He has gotten better clinically over the last 72 hours with diuretics and steroids and cxr done 1/4 with  improving pulmonary edema.  Despite this patient is still on 10L high flow and not weaning. He was not on lovenox first few days due to thrombocytopenia, he is already on full dose lovenox now last 48 hrs due to afib runs. Will get d-dimer, if neg will do ct chest w/o, if positive CTA to make sure we aren't missing some other process. Finishing 7 day  course of levaquin today. Wean O2 as able.  Continue nebulizers and steroids.    #2 syncope and collapse -prior to arrival.  Patient is an alcoholic and had been drinking.  He was hyponatremic on arrival.  CT head was nonacute.  2D echo showed diastolic dysfunction otherwise normal.  He had had no arrhythmias for several days until he began having runs of A. fib RVR on 1/1.  Could potentially be etiology as well but less likely.  He has had no further syncope since arrival. Again... ? PE, will f/u d-dimer.    #3 COPD -with exacerbation.  Likely in the setting of influenza.  Steroid dosing increased 1/2 with improvement in aeration.  COPD little better.  Will decrease to 40 iv q12 hrs today.  Continue nebulizers and supportive care.    #4 acute on chronic diastolic chf - doing well with lasix, now -5L for stay, cxr improved. Renal function stable. Continue diuresis.     #5 hyponatremia  -on arrival.  Patient was slowly hydrated and was improving slowly day by day from 120 on arrival to 130.  Fluids were stopped and after diuretics he is now 137    #6 influenza A  -completed Tamiflu, doing better, afebrile, cough resolved.       #7 paroxysmal A. Fib - patient went A. fib RVR at 160 on 1/1.  Was given 5 IV Lopressor and returned to sinus rhythm.  His oral Lopressor was increased from 12.5 twice daily 25 twice daily.  He had another brief run after arrival to the ICU with Cardizem.  Has been primarily sinus since with frequent PACs.  Some occasional short runs of A. fib.  Patient states he has had A. fib in the past but I cannot find in the records.  He is not noted to be on a blood thinner prior to hospitalization. ? due to concerns for fall risk and alcoholism.  He denies any life-threatening bleeds or brain bleeds.  Currently on Lovenox 1 mg/kg twice a day today for now and will need to decide on oral AC prior to d/c. Continue Lopressor to 50 twice daily.    #8 ?CAP -on arrival chest x-ray was suspicious for  pneumonia.  He had no white count or fever.  Did have the flu.  He has been on antibiotics since arrival and his procalcitonin is 0.15.  His x-ray has not improved and his respiratory status worsened despite antibiotics.  Suspect this may have been fluid all along.  We will continue antibiotics however and treat for 7 days total.    #9 Rhabdomyolysis -in the setting of influenza and syncope/fall.  He denies any muscle aches or pains.  Was down from 1300 to 900 since arrival with IV fluids however he a had worsening respiratory status and appeared to be in some volume overload.  Fluids were stopped and diuretics started.  CK improved under 500.  Renal function stable.    #9 alcohol abuse -patient with history of alcohol abuse.  He has been on CIWA scores here and doing well with no signs of active withdrawal.  D/c ciwa scale and prn ativan today as he has not needed it. Continue multivitamin, thiamine, and folate.    #10 thrombocytopenia -likely in the setting of alcohol abuse and bone marrow suppression.  Could also have been in the setting of viral illness/influenza. No signs of active bleeding.  No need for transfusion.  Counts now normal last 48 hours and tolerating lovenox.     #11 hypokalemia - replace by protocol, recheck in AM along w/ mag      Discharge Planning: Disposition ongoing.  Patient working with PT. potential home with home with services versus SNF.  Likely 3+ days.  Remain in unit for now given high O2 needs.  Will f/u CT. Remains very guarded/tenuous. Try to wean 02 as able.       Del Park DO   01/05/20   8:51 AM      Electronically signed by Del Park DO at 01/05/20 0148       Consult Notes (last 24 hours) (Notes from 01/05/20 1659 through 01/06/20 1847)    No notes of this type exist for this encounter.

## 2020-01-06 NOTE — PROGRESS NOTES
Continued Stay Note   [place]     Patient Name: Sascha Oden Sr.  MRN: [id number]  Today's Date: 1/6/2020    Admit Date: 12/29/2019    Discharge Plan     Row Name 01/06/20 1338       Plan    Plan  Home    Patient/Family in Agreement with Plan  yes    Plan Comments  Patient has improved and is doing well with physical therapy.  SW spoke with patient regarding discharge plan and patient stated he will return home at discharge.  Patient declines any type of rehab placement.  Patient states he currently has a friend that is residing with him.  Patient also declines  services.          Discharge Codes    No documentation.             PAT Diop

## 2020-01-07 LAB
ANION GAP SERPL CALCULATED.3IONS-SCNC: 13 MMOL/L (ref 5–15)
BUN BLD-MCNC: 20 MG/DL (ref 6–20)
BUN/CREAT SERPL: 30.3 (ref 7–25)
CALCIUM SPEC-SCNC: 9.6 MG/DL (ref 8.6–10.5)
CHLORIDE SERPL-SCNC: 94 MMOL/L (ref 98–107)
CO2 SERPL-SCNC: 28 MMOL/L (ref 22–29)
CREAT BLD-MCNC: 0.66 MG/DL (ref 0.76–1.27)
GFR SERPL CREATININE-BSD FRML MDRD: 124 ML/MIN/1.73
GLUCOSE BLD-MCNC: 130 MG/DL (ref 65–99)
POTASSIUM BLD-SCNC: 4 MMOL/L (ref 3.5–5.2)
SODIUM BLD-SCNC: 135 MMOL/L (ref 136–145)

## 2020-01-07 PROCEDURE — 94799 UNLISTED PULMONARY SVC/PX: CPT

## 2020-01-07 PROCEDURE — 25010000002 METHYLPREDNISOLONE PER 40 MG: Performed by: INTERNAL MEDICINE

## 2020-01-07 PROCEDURE — 80048 BASIC METABOLIC PNL TOTAL CA: CPT | Performed by: INTERNAL MEDICINE

## 2020-01-07 PROCEDURE — 25010000002 THIAMINE PER 100 MG: Performed by: INTERNAL MEDICINE

## 2020-01-07 PROCEDURE — 94618 PULMONARY STRESS TESTING: CPT

## 2020-01-07 PROCEDURE — 97110 THERAPEUTIC EXERCISES: CPT

## 2020-01-07 PROCEDURE — 94760 N-INVAS EAR/PLS OXIMETRY 1: CPT

## 2020-01-07 PROCEDURE — 25010000002 ENOXAPARIN PER 10 MG: Performed by: INTERNAL MEDICINE

## 2020-01-07 PROCEDURE — 25010000002 FUROSEMIDE PER 20 MG: Performed by: INTERNAL MEDICINE

## 2020-01-07 PROCEDURE — 97116 GAIT TRAINING THERAPY: CPT

## 2020-01-07 RX ORDER — ASPIRIN 81 MG/1
81 TABLET, CHEWABLE ORAL DAILY
Status: DISCONTINUED | OUTPATIENT
Start: 2020-01-08 | End: 2020-01-08 | Stop reason: HOSPADM

## 2020-01-07 RX ORDER — DIAZEPAM 2 MG/1
2 TABLET ORAL EVERY 12 HOURS SCHEDULED
Status: DISCONTINUED | OUTPATIENT
Start: 2020-01-08 | End: 2020-01-08 | Stop reason: HOSPADM

## 2020-01-07 RX ORDER — FUROSEMIDE 40 MG/1
40 TABLET ORAL DAILY
Status: DISCONTINUED | OUTPATIENT
Start: 2020-01-07 | End: 2020-01-08 | Stop reason: HOSPADM

## 2020-01-07 RX ORDER — PREDNISONE 20 MG/1
40 TABLET ORAL
Status: DISCONTINUED | OUTPATIENT
Start: 2020-01-08 | End: 2020-01-08 | Stop reason: HOSPADM

## 2020-01-07 RX ADMIN — APIXABAN 5 MG: 5 TABLET, FILM COATED ORAL at 21:28

## 2020-01-07 RX ADMIN — METHYLPREDNISOLONE SODIUM SUCCINATE 40 MG: 40 INJECTION, POWDER, LYOPHILIZED, FOR SOLUTION INTRAMUSCULAR; INTRAVENOUS at 06:21

## 2020-01-07 RX ADMIN — DOCUSATE SODIUM 100 MG: 100 CAPSULE, LIQUID FILLED ORAL at 09:19

## 2020-01-07 RX ADMIN — FUROSEMIDE 40 MG: 40 TABLET ORAL at 15:45

## 2020-01-07 RX ADMIN — ACETAMINOPHEN 650 MG: 325 TABLET, FILM COATED ORAL at 00:15

## 2020-01-07 RX ADMIN — GUAIFENESIN 1200 MG: 600 TABLET, EXTENDED RELEASE ORAL at 21:28

## 2020-01-07 RX ADMIN — METOPROLOL TARTRATE 50 MG: 50 TABLET, FILM COATED ORAL at 21:28

## 2020-01-07 RX ADMIN — ENOXAPARIN SODIUM 80 MG: 80 INJECTION SUBCUTANEOUS at 09:19

## 2020-01-07 RX ADMIN — DIAZEPAM 2 MG: 2 TABLET ORAL at 06:22

## 2020-01-07 RX ADMIN — FLUTICASONE PROPIONATE 2 SPRAY: 50 SPRAY, METERED NASAL at 09:19

## 2020-01-07 RX ADMIN — DIAZEPAM 2 MG: 2 TABLET ORAL at 13:09

## 2020-01-07 RX ADMIN — HYDROCODONE BITARTRATE AND ACETAMINOPHEN 1 TABLET: 10; 325 TABLET ORAL at 13:12

## 2020-01-07 RX ADMIN — DOCUSATE SODIUM 100 MG: 100 CAPSULE, LIQUID FILLED ORAL at 21:28

## 2020-01-07 RX ADMIN — FUROSEMIDE 40 MG: 10 INJECTION, SOLUTION INTRAVENOUS at 09:19

## 2020-01-07 RX ADMIN — Medication 250 MG: at 21:28

## 2020-01-07 RX ADMIN — GUAIFENESIN 1200 MG: 600 TABLET, EXTENDED RELEASE ORAL at 09:19

## 2020-01-07 RX ADMIN — METHOCARBAMOL 500 MG: 500 TABLET ORAL at 21:28

## 2020-01-07 RX ADMIN — ATORVASTATIN CALCIUM 10 MG: 10 TABLET, FILM COATED ORAL at 09:22

## 2020-01-07 RX ADMIN — Medication 250 MG: at 09:19

## 2020-01-07 RX ADMIN — CLOPIDOGREL BISULFATE 75 MG: 75 TABLET, FILM COATED ORAL at 09:19

## 2020-01-07 RX ADMIN — HYDROCODONE BITARTRATE AND ACETAMINOPHEN 1 TABLET: 10; 325 TABLET ORAL at 23:29

## 2020-01-07 RX ADMIN — METHOCARBAMOL 500 MG: 500 TABLET ORAL at 06:22

## 2020-01-07 RX ADMIN — SODIUM CHLORIDE, PRESERVATIVE FREE 10 ML: 5 INJECTION INTRAVENOUS at 09:37

## 2020-01-07 RX ADMIN — METHYLPREDNISOLONE SODIUM SUCCINATE 40 MG: 40 INJECTION, POWDER, LYOPHILIZED, FOR SOLUTION INTRAMUSCULAR; INTRAVENOUS at 17:30

## 2020-01-07 RX ADMIN — METHOCARBAMOL 500 MG: 500 TABLET ORAL at 13:09

## 2020-01-07 RX ADMIN — IPRATROPIUM BROMIDE AND ALBUTEROL SULFATE 3 ML: 2.5; .5 SOLUTION RESPIRATORY (INHALATION) at 13:51

## 2020-01-07 RX ADMIN — METOPROLOL TARTRATE 50 MG: 50 TABLET, FILM COATED ORAL at 09:19

## 2020-01-07 RX ADMIN — THIAMINE HYDROCHLORIDE 100 MG: 100 INJECTION, SOLUTION INTRAMUSCULAR; INTRAVENOUS at 10:43

## 2020-01-07 RX ADMIN — POLYETHYLENE GLYCOL 3350 17 G: 17 POWDER, FOR SOLUTION ORAL at 09:21

## 2020-01-07 RX ADMIN — Medication 1 TABLET: at 10:44

## 2020-01-07 RX ADMIN — IPRATROPIUM BROMIDE AND ALBUTEROL SULFATE 3 ML: 2.5; .5 SOLUTION RESPIRATORY (INHALATION) at 19:17

## 2020-01-07 RX ADMIN — IPRATROPIUM BROMIDE AND ALBUTEROL SULFATE 3 ML: 2.5; .5 SOLUTION RESPIRATORY (INHALATION) at 07:28

## 2020-01-07 RX ADMIN — NICOTINE 1 PATCH: 21 PATCH, EXTENDED RELEASE TRANSDERMAL at 09:20

## 2020-01-07 RX ADMIN — FLUTICASONE PROPIONATE 2 SPRAY: 50 SPRAY, METERED NASAL at 21:28

## 2020-01-07 RX ADMIN — SODIUM CHLORIDE, PRESERVATIVE FREE 10 ML: 5 INJECTION INTRAVENOUS at 21:31

## 2020-01-07 NOTE — PLAN OF CARE
Problem: Patient Care Overview  Goal: Plan of Care Review  Outcome: Ongoing (interventions implemented as appropriate)  Flowsheets (Taken 1/7/2020 0403)  Progress: no change  Plan of Care Reviewed With: patient  Outcome Summary: Received to 476 from ICU. Droplet precautions in place for flu.  O2 sats WNL at 4L NC.  No s/s of ETOH withdrawal other than headache; medicated with PRN Tylenol with good results.  No c/o of dizziness.  NSR per tele.  VSS. Safety maintained.  Will continue to monitor.

## 2020-01-07 NOTE — PROGRESS NOTES
RT Nebulizer Protocol    Assessment tool to be used for patients with existing breathing treatments ordered by hospitalists                                                                  0  1  2  3  4      Respiratory History   No Smoking      Smoking History      1 Pack/Day   x   Pulmonary Disease      Exacerbation        Respiratory Rate   Normal   x   20-25      Dyspneic      Accessory Muscles      Severe Dyspnea        Breath Sounds   Clear      Crackles    Crackles/ Rhonchi   x   Wheezing      Absent/ Severe Wheezing        Chest   X-ray   Clear      1 Lobe Infiltration/ Consolidation/ PE      2 Lobe Same Lung Infiltration/ Consolidation/ PE       2 Lobe Infiltration/ Both Lungs/ Consolidation/ PE   x   Both Lungs/ More Than 1 Lobe/ Atelectasis/ Consolidation/  PE        Cough   Strong Non- Productive   x   Excessive Secretions/ Strong Cough    Excessive Secretions/ Weak Cough      Thick Bronchial Secretions/ Weak Cough      Thick Bronchial Secretions/ No Cough        Total Patient Score =  7  0-4=Q4 PRN  5-9=TID and Q4 PRN  10-14=QID and Q3 PRN  15-19=Q4 and Q2 PRN  20=Q3 and Q2 PRN    Bronchopulmonary Hygiene (CPT)   Q4 ATC Copious secretions, dyspnea, unable to sleep, mucus plug    QID & Q4 PRN Moderate secretions    TID Small amounts of secretions w/ poor cough and history of secretions    BID Unable to deep breathe and cough spontaneously       Lung Expansion Therapy (PEP)   Q4 & PRN at night Severe atelectasis, poor oxygenation    QID  High risk for persistent atelectasis, existence of same    TID At risk for developing atelectasis    BID Unable to deep breathe and cough spontaneously     Instruct, 1 follow up Patients able to perform well on their own      Pt states he doesn't take tx at home. Current smoker 1 pk/day.    Treatments changed to TID and Q4prn per protocol

## 2020-01-07 NOTE — PLAN OF CARE
Problem: Patient Care Overview  Goal: Plan of Care Review  Outcome: Ongoing (interventions implemented as appropriate)  Flowsheets (Taken 1/7/2020 1140)  Outcome Summary: Pt. is independent in bed mobility. Transfers with supervision. Ambulated 400' with RWX and SBA. SAT on 3lpm at 94%. Actively works thru LE ex's. Will benefit from strengthening and increased activity.

## 2020-01-07 NOTE — PROGRESS NOTES
RT Nebulizer Protocol    Assessment tool to be used for patients with existing breathing treatments ordered by hospitalists                                                                  0  1  2  3  4      Respiratory History   No Smoking      Smoking History      1 Pack/Day   x   Pulmonary Disease      Exacerbation        Respiratory Rate   Normal   x   20-25      Dyspneic      Accessory Muscles      Severe Dyspnea        Breath Sounds   Clear      Crackles   x   Crackles/ Rhonchi      Wheezing      Absent/ Severe Wheezing        Chest   X-ray   Clear      1 Lobe Infiltration/ Consolidation/ PE      2 Lobe Same Lung Infiltration/ Consolidation/ PE       2 Lobe Infiltration/ Both Lungs/ Consolidation/ PE   x   Both Lungs/ More Than 1 Lobe/ Atelectasis/ Consolidation/  PE        Cough   Strong Non- Productive   x   Excessive Secretions/ Strong Cough      Excessive Secretions/ Weak Cough      Thick Bronchial Secretions/ Weak Cough      Thick Bronchial Secretions/ No Cough        Total Patient Score =  6  0-4=Q4 PRN  5-9=TID and Q4 PRN  10-14=QID and Q3 PRN  15-19=Q4 and Q2 PRN  20=Q3 and Q2 PRN    Bronchopulmonary Hygiene (CPT)   Q4 ATC Copious secretions, dyspnea, unable to sleep, mucus plug    QID & Q4 PRN Moderate secretions    TID Small amounts of secretions w/ poor cough and history of secretions    BID Unable to deep breathe and cough spontaneously       Lung Expansion Therapy (PEP)   Q4 & PRN at night Severe atelectasis, poor oxygenation    QID  High risk for persistent atelectasis, existence of same    TID At risk for developing atelectasis    BID Unable to deep breathe and cough spontaneously     Instruct, 1 follow up Patients able to perform well on their own          Per protocol, continue tx TID and Q4 PRN Duoneb.  Reevaluate in 24 hours.

## 2020-01-07 NOTE — PLAN OF CARE
Patient complains of chronic head and back pain.PRN medications given.Patient anxious to go home.Will continue to monitor HR and O2 saturations.Smoking cessation advised.Ambulates in hallway with standby assist.Safety maintained,will continue to follow.

## 2020-01-07 NOTE — PROGRESS NOTES
[facility] Medicine Services  INPATIENT PROGRESS NOTE    Patient Name: Sascha Oden Sr.  Date of Admission: 12/29/2019  Today's Date: 01/07/20  Length of Stay: 9  Primary Care Physician: Narendra Camara MD    Subjective   Chief Complaint: Follow-up  HPI   Patient feels better  He is not short of breath with oxygen but has obvious difficulty breathing off oxygen as observed directly during my encounter  He walk with therapies and did well on 3 L nasal cannula.  He walked about 400 feet  He has not exhibited any tremors or signs of withdrawal while on diazepam every 8 hourly  He is eager to go home  I had a long discussion with him regarding risk benefit of chronic anticoagulation      Review of Systems     All pertinent negatives and positives are as above. All other systems have been reviewed and are negative unless otherwise stated.     Objective    Temp:  [97.2 °F (36.2 °C)-98.2 °F (36.8 °C)] 98.2 °F (36.8 °C)  Heart Rate:  [] 90  Resp:  [14-19] 18  BP: (101-125)/(65-87) 125/80  Physical Exam  He is appears to be hemodynamically stable  Not in any distress  GEN: Awake, alert, interactive, in NAD.    HEENT: PERRLA, EOMI, Anicteric, Trachea midline  Lungs: diminished but good air entry/flow, no wheezing.  No gross crackles barrel chested  heart: RRR, +S1/s2, no rub; telemetry showed occasional PAC and PVC but maintaining sinus rhythm with appropriate rate  ABD: soft, nt/nd, +BS, no guarding/rebound  Extremities: atraumatic, no cyanosis, no peripheral edema  Noticeable loss of muscle mass of lower extremities  Skin: no rashes or lesions  Neuro: AAOx3, no focal deficits  Psych: normal mood & affect  He became labored after removing the oxygen while talking    Results Review:  I have reviewed the labs, radiology results, and diagnostic studies.    Laboratory Data:   Results from last 7 days   Lab Units 01/05/20  0231 01/04/20  0250 01/03/20  0351   WBC 10*3/mm3 10.11 9.70  3.93   HEMOGLOBIN g/dL 15.3 15.0 15.2   HEMATOCRIT % 43.0 42.0 42.5   PLATELETS 10*3/mm3 246 174 112*        Results from last 7 days   Lab Units 01/06/20  0325 01/05/20  0231 01/04/20  0249  01/03/20  0351   SODIUM mmol/L 137 137 137  --  136   POTASSIUM mmol/L 4.2 3.4* 4.0   < > 3.3*   CHLORIDE mmol/L 97* 95* 97*  --  96*   CO2 mmol/L 28.0 30.0* 28.0  --  29.0   BUN mg/dL 15 17 15  --  12   CREATININE mg/dL 0.73* 0.61* 0.60*  --  0.65*   CALCIUM mg/dL 9.0 9.1 9.4  --  8.9   BILIRUBIN mg/dL  --   --  0.3  --  0.2   ALK PHOS U/L  --   --  52  --  55   ALT (SGPT) U/L  --   --  54*  --  47*   AST (SGOT) U/L  --   --  55*  --  67*   GLUCOSE mg/dL 176* 125* 128*  --  169*    < > = values in this interval not displayed.       Culture Data:   No results found for: BLOODCX, URINECX, WOUNDCX, MRSACX, RESPCX, STOOLCX    Radiology Data:   Imaging Results (Last 24 Hours)     ** No results found for the last 24 hours. **          I have reviewed the patient's current medications.     Assessment/Plan     Active Hospital Problems    Diagnosis   • Acute respiratory failure with hypoxia (CMS/HCC)   • Paroxysmal atrial fibrillation with rapid ventricular response (CMS/HCC)   • Hypokalemia   • Influenza A   • Hyponatremia   • Alcoholism /alcohol abuse (CMS/HCC)   • COPD with acute exacerbation (CMS/HCC)   • CAP (community acquired pneumonia)   • Thrombocytopenia (CMS/HCC)   • Syncope and collapse   • Rhabdomyolysis   • Hypertension       Discussed with Polly Reddy with vascular surgery regarding aspirin instead of Plavix with the use of Eliquis in the setting of paroxysmal atrial fibrillation and history of PAD and revascularization.  She is agreeable to the change and will monitor in outpatient.    I also emphasized to the patient rationale of anticoagulation and the risk associated with it. Has bled score is 3  at 5.8% the risk of the risk of stroke is 2.9%.  He would like to be on anticoagulation for prevention of stroke.   Patient was cautioned regarding increased risk of bleeding.      I emphasized to the patient smoking cessation and alcohol cessation    I will taper him off diazepam    I changed the IV Lasix to oral    We will monitor his heart rate as during the encounter when I cut back his oxygen to see his room air oxygen his heart rate went up to 169.  It reverted back to normal.  Note that this was not reflected in the bedside monitor however telemetry staff called to inform us.  Subsequent telemetry showed sinus rhythm with occasional PAC    We will do home O2 desaturation test.  I will be surprised if he does not meet criteria for oxygen    I will change him to prednisone from methylprednisolone  Patient declined home health and states a friend will be staying with him  discussed with nurse Abbie      apixaban 5 mg Oral Q12H   [START ON 1/8/2020] aspirin 81 mg Oral Daily   atorvastatin 10 mg Oral Daily   diazePAM 2 mg Oral Q8H   docusate sodium 100 mg Oral BID   fluticasone 2 spray Each Nare BID   furosemide 40 mg Oral Daily   guaiFENesin 1,200 mg Oral Q12H   ipratropium-albuterol 3 mL Nebulization TID - RT   methocarbamol 500 mg Oral Q8H   methylPREDNISolone sodium succinate 40 mg Intravenous Q12H   metoprolol tartrate 50 mg Oral Q12H   multivitamin w/minerals 1 tablet Oral Daily   nicotine 1 patch Transdermal Daily   polyethylene glycol 17 g Oral Daily   [START ON 1/8/2020] predniSONE 40 mg Oral Daily With Breakfast   saccharomyces boulardii 250 mg Oral BID   sodium chloride 10 mL Intravenous Q12H   thiamine (VITAMIN B1) IVPB 100 mg Intravenous Daily   Or      thiamine 100 mg Oral Daily       Discharge Planning: Probably home tomorrow when remains hemodynamically stable  Man Luis MD   01/07/20   2:55 PM

## 2020-01-07 NOTE — PROGRESS NOTES
Exercise Oximetry    Patient Name:Sascha Oden Sr.   MRN: [id number]   Date: 01/07/20             ROOM AIR BASELINE   SpO2% 84   Heart Rate 71   Blood Pressure      EXERCISE ON ROOM AIR SpO2% EXERCISE ON O2 @ 4 LPM SpO2%   1 MINUTE  1 MINUTE 93   2 MINUTES  2 MINUTES 91   3 MINUTES  3 MINUTES    4 MINUTES  4 MINUTES 91   5 MINUTES  5 MINUTES    6 MINUTES  6 MINUTES               Distance Walked  Distance Walked 250 feet   Dyspnea (Ron Scale)   Dyspnea (Ron Scale) 3   Fatigue (Ron Scale)   Fatigue (Ron Scale) 0   SpO2% Post Exercise  SpO2% Post Exercise 92   HR Post Exercise  HR Post Exercise 95   Time to Recovery   Time to Recovery 2 minutes     Comments:

## 2020-01-07 NOTE — PLAN OF CARE
Problem: Patient Care Overview  Goal: Plan of Care Review  Outcome: Ongoing (interventions implemented as appropriate)  Flowsheets (Taken 1/7/2020 7306)  Progress: improving  Plan of Care Reviewed With: patient  Note:   Pt. Performs ue exs to increase his act. Jerry and transfer ability! Pt. Sob and requires frequent rests to decrease chance of fatigue!

## 2020-01-08 VITALS
HEIGHT: 76 IN | DIASTOLIC BLOOD PRESSURE: 82 MMHG | SYSTOLIC BLOOD PRESSURE: 125 MMHG | WEIGHT: 170.2 LBS | RESPIRATION RATE: 18 BRPM | OXYGEN SATURATION: 91 % | BODY MASS INDEX: 20.73 KG/M2 | TEMPERATURE: 97.8 F | HEART RATE: 73 BPM

## 2020-01-08 PROCEDURE — 97110 THERAPEUTIC EXERCISES: CPT

## 2020-01-08 PROCEDURE — 94799 UNLISTED PULMONARY SVC/PX: CPT

## 2020-01-08 PROCEDURE — 97116 GAIT TRAINING THERAPY: CPT

## 2020-01-08 PROCEDURE — 63710000001 PREDNISONE PER 1 MG: Performed by: INTERNAL MEDICINE

## 2020-01-08 RX ORDER — PREDNISONE 20 MG/1
TABLET ORAL
Qty: 7 TABLET | Refills: 0 | OUTPATIENT
Start: 2020-01-08 | End: 2020-07-03

## 2020-01-08 RX ORDER — PSEUDOEPHEDRINE HCL 30 MG
100 TABLET ORAL 2 TIMES DAILY
Qty: 10 EACH | Refills: 0 | Status: SHIPPED | OUTPATIENT
Start: 2020-01-08 | End: 2020-01-13

## 2020-01-08 RX ORDER — ASPIRIN 81 MG/1
81 TABLET, CHEWABLE ORAL DAILY
Qty: 30 TABLET | Refills: 0 | Status: SHIPPED | OUTPATIENT
Start: 2020-01-09

## 2020-01-08 RX ORDER — NICOTINE 21 MG/24HR
1 PATCH, TRANSDERMAL 24 HOURS TRANSDERMAL DAILY
Qty: 30 PATCH | Refills: 0 | OUTPATIENT
Start: 2020-01-09 | End: 2020-07-03

## 2020-01-08 RX ORDER — FLUTICASONE PROPIONATE 50 MCG
2 SPRAY, SUSPENSION (ML) NASAL 2 TIMES DAILY
Qty: 1 BOTTLE | Refills: 0 | Status: SHIPPED | OUTPATIENT
Start: 2020-01-08 | End: 2021-05-28

## 2020-01-08 RX ORDER — ALBUTEROL SULFATE 90 UG/1
2 AEROSOL, METERED RESPIRATORY (INHALATION) EVERY 4 HOURS PRN
Qty: 1 INHALER | Refills: 0 | Status: SHIPPED | OUTPATIENT
Start: 2020-01-08 | End: 2021-05-12

## 2020-01-08 RX ORDER — METOPROLOL TARTRATE 50 MG/1
50 TABLET, FILM COATED ORAL EVERY 12 HOURS SCHEDULED
Qty: 60 TABLET | Refills: 0 | Status: SHIPPED | OUTPATIENT
Start: 2020-01-08 | End: 2020-11-24 | Stop reason: SDUPTHER

## 2020-01-08 RX ORDER — MULTIVIT,CALC,MINS/IRON/FOLIC 9MG-400MCG
1 TABLET ORAL DAILY
Qty: 30 TABLET | Refills: 0 | Status: SHIPPED | OUTPATIENT
Start: 2020-01-09 | End: 2020-09-11

## 2020-01-08 RX ORDER — DIAZEPAM 2 MG/1
TABLET ORAL
Qty: 5 TABLET | Refills: 0 | OUTPATIENT
Start: 2020-01-08 | End: 2020-07-03

## 2020-01-08 RX ADMIN — Medication 1 TABLET: at 09:52

## 2020-01-08 RX ADMIN — METHOCARBAMOL 500 MG: 500 TABLET ORAL at 14:15

## 2020-01-08 RX ADMIN — Medication 100 MG: at 08:17

## 2020-01-08 RX ADMIN — GUAIFENESIN 1200 MG: 600 TABLET, EXTENDED RELEASE ORAL at 08:17

## 2020-01-08 RX ADMIN — FUROSEMIDE 40 MG: 40 TABLET ORAL at 08:17

## 2020-01-08 RX ADMIN — Medication 250 MG: at 08:17

## 2020-01-08 RX ADMIN — APIXABAN 5 MG: 5 TABLET, FILM COATED ORAL at 08:17

## 2020-01-08 RX ADMIN — METHOCARBAMOL 500 MG: 500 TABLET ORAL at 06:45

## 2020-01-08 RX ADMIN — PREDNISONE 40 MG: 20 TABLET ORAL at 08:17

## 2020-01-08 RX ADMIN — POLYETHYLENE GLYCOL 3350 17 G: 17 POWDER, FOR SOLUTION ORAL at 08:17

## 2020-01-08 RX ADMIN — IPRATROPIUM BROMIDE AND ALBUTEROL SULFATE 3 ML: 2.5; .5 SOLUTION RESPIRATORY (INHALATION) at 07:37

## 2020-01-08 RX ADMIN — FLUTICASONE PROPIONATE 2 SPRAY: 50 SPRAY, METERED NASAL at 08:17

## 2020-01-08 RX ADMIN — ATORVASTATIN CALCIUM 10 MG: 10 TABLET, FILM COATED ORAL at 08:17

## 2020-01-08 RX ADMIN — ASPIRIN 81 MG: 81 TABLET, CHEWABLE ORAL at 08:17

## 2020-01-08 RX ADMIN — NICOTINE 1 PATCH: 21 PATCH, EXTENDED RELEASE TRANSDERMAL at 08:17

## 2020-01-08 RX ADMIN — DOCUSATE SODIUM 100 MG: 100 CAPSULE, LIQUID FILLED ORAL at 08:17

## 2020-01-08 RX ADMIN — METOPROLOL TARTRATE 50 MG: 50 TABLET, FILM COATED ORAL at 08:17

## 2020-01-08 RX ADMIN — SODIUM CHLORIDE, PRESERVATIVE FREE 10 ML: 5 INJECTION INTRAVENOUS at 08:18

## 2020-01-08 NOTE — PROGRESS NOTES
Continued Stay Note  [facility]     Patient Name: Sascha Oden Sr.  MRN: [id number]  Today's Date: 1/8/2020    Admit Date: 12/29/2019    Discharge Plan     Row Name 01/08/20 1530       Plan    Plan  [facility]    Provided post acute provider list?  Yes    Post Acute Provider Lists  Home Health    Delivered To  Patient    Method of Delivery  In person    Patient/Family in Agreement with Plan  yes    Final Discharge Disposition Code  06 - home with home health care    Final Note  Pt has  care ordered and with options available in [facility]. Called Rebekah from [phone] and provided referral.     Row Name 01/08/20 1031       Plan    Plan  HOME    Patient/Family in Agreement with Plan  yes    Final Discharge Disposition Code  01 - home or self-care    Final Note  Pt is being dishcarged home today.  Pt needs O2 set up for home and with options available in county of residence he prefers [facility].  Called Neisha at [facility] and inform of d/c status and need for protable O2 brought to room before he leaves today. [phone]        Discharge Codes    No documentation.       Expected Discharge Date and Time     Expected Discharge Date Expected Discharge Time    Jan 8, 2020             ROCHELLE Lerma

## 2020-01-08 NOTE — PLAN OF CARE
Problem: Patient Care Overview  Goal: Plan of Care Review  Outcome: Ongoing (interventions implemented as appropriate)  Flowsheets (Taken 1/8/2020 1683)  Progress: no change  Plan of Care Reviewed With: patient  Outcome Summary: 02 sat of 85% on room air while asleep - seems very anxious to go home (concern is return to smoking and ETOH) still on 4L 02 - low CIWA score but still antipate not optimum outcome with close follow up - no acute distress - will cont to monitor

## 2020-01-08 NOTE — PROGRESS NOTES
"    [facility] Medicine Services  DISCHARGE SUMMARY       Date of Admission: 12/29/2019  Date of Discharge:  1/8/2020  Primary Care Physician: Narendra Camara MD    Discharge Diagnoses:  Active Hospital Problems    Diagnosis   • Acute respiratory failure with hypoxia (CMS/HCC)   • Paroxysmal atrial fibrillation with rapid ventricular response (CMS/HCC)   • Hypokalemia   • Influenza A   • Hyponatremia   • Alcoholism /alcohol abuse (CMS/HCC)   • COPD with acute exacerbation (CMS/HCC)   • CAP (community acquired pneumonia)   • Thrombocytopenia (CMS/HCC)   • Syncope and collapse   • Rhabdomyolysis   • Hypertension   PAD with prior stents/revascularization      Presenting Problem/History of Present Illness:  Rhabdomyolysis [M62.82]         Hospital Course  59-year-old man who I started seeing on on day 8 of hospitalization.  He presented with complaint of \"syncope\".  He carries past medical history of alcohol abuse, COPD/emphysema and peripheral vascular disease.     When he first came in he was hyponatremic  His CK was elevated at 1281  He was hypoxic with PaO2 of 51.5 on 3 L nasal cannula  He was tested positive for influenza A and had been treated with Tamiflu.  Urine drug screen is negative  Ethanol is positive    He was monitored and received medication for alcoholism with withdrawal.    He was able to transition from critical care unit bed to regular bed.    He received treatment for COPD exacerbation.  He was counseled on benefits of smoking cessation, consequence of smoking nearby oxygen.  She is requiring oxygen upon discharge.    He completed treatment for influenza A.  I am not certain about the benefit of influenza vaccination in someone who just had influenza.  Probably prudent to defer this to his primary care provider for vaccination to a future date rather than a recent hospitalization related to this.    He was found with paroxysmal atrial fibrillation.  His chads " Vascor indicates 2.9% risk of TIA, stroke, thrombosis.  Risk-benefit discussed with him and weight on risk of bleeding.  He chose not to have stroke and would like to proceed with anticoagulation.  I discussed with vascular service (Polly) regarding switching him from Plavix to aspirin while on Eliquis.  She is agreeable.    His echocardiogram showed no evidence of heart failure.  He was on Lasix during the hospitalization.  I felt that he is euvolemic.  Further Lasix is not warranted.    He had rhabdomyolysis which is now improved.    Overall he is at his best condition and felt medically appropriate for discharge      Procedures Performed: None    Consults:  None    Pertinent Test Results:  Lab Results (last 72 hours)     Procedure Component Value Units Date/Time    Basic Metabolic Panel [[id number]]  (Abnormal) Collected:  01/07/20 1500    Specimen:  Blood Updated:  01/07/20 1557     Glucose 130 mg/dL      BUN 20 mg/dL      Creatinine 0.66 mg/dL      Sodium 135 mmol/L      Potassium 4.0 mmol/L      Chloride 94 mmol/L      CO2 28.0 mmol/L      Calcium 9.6 mg/dL      eGFR Non African Amer 124 mL/min/1.73      BUN/Creatinine Ratio 30.3     Anion Gap 13.0 mmol/L     Narrative:       GFR Normal >60  Chronic Kidney Disease <60  Kidney Failure <15      Magnesium [[id number]]  (Normal) Collected:  01/06/20 0325    Specimen:  Blood Updated:  01/06/20 0404     Magnesium 2.4 mg/dL     Basic Metabolic Panel [[id number]]  (Abnormal) Collected:  01/06/20 0325    Specimen:  Blood Updated:  01/06/20 0401     Glucose 176 mg/dL      BUN 15 mg/dL      Creatinine 0.73 mg/dL      Sodium 137 mmol/L      Potassium 4.2 mmol/L      Chloride 97 mmol/L      CO2 28.0 mmol/L      Calcium 9.0 mg/dL      eGFR Non African Amer 111 mL/min/1.73      BUN/Creatinine Ratio 20.5     Anion Gap 12.0 mmol/L     Narrative:       GFR Normal >60  Chronic Kidney Disease <60  Kidney Failure <15      Phosphorus [[id number]]  (Normal) Collected:  01/06/20 0325     Specimen:  Blood Updated:  01/06/20 0401     Phosphorus 4.2 mg/dL         Imaging Results (All)     Procedure Component Value Units Date/Time    CT Angiogram Chest With & Without Contrast [[id number]] Collected:  01/05/20 1233     Updated:  01/05/20 1246    Narrative:       CT ANGIOGRAM CHEST W WO CONTRAST- 1/5/2020 12:20 PM CST     HISTORY: hypoxic RF w/o significant improvement despite interventions,  elevated d-dimer; T79.6XXA-Traumatic ischemia of muscle, initial  encounter; E87.0-Ojtr-stlofgmjco and hyponatremia; R13.10-Dysphagia,  unspecified; Z74.09-Other reduced mobility; Z74.09-Other reduced  mobility      COMPARISON: 11/03/2016     DOSE LENGTH PRODUCT: 427 mGy cm. Automated exposure control was also  utilized to decrease patient radiation dose.     TECHNIQUE: Axial images the chest are obtained following IV contrast.  2-D and maximal intensity projection images reconstructed and reviewed.  Contrast bolus suboptimal, perhaps related to the SVC stenosis.     FINDINGS:  There is a heterogeneous appearance to the contrast within  the right ventricle as well as main pulmonary artery is which is more  favorable for mixing of contrast with nonopacified blood. A discrete  pulmonary embolus is not visualized. The superior vena cava is slitlike  in appearance with no extrinsic compression which may represent an SVC  stenosis. There is moderate coronary artery calcification. Scattered  atherosclerotic calcification but no thoracic aortic aneurysm or  dissection. There is trace pericardial fluid. There are no pleural  effusions. No pathologic intrathoracic or axillary lymphadenopathy is  visualized.     There is elevation left hemidiaphragm. Adrenal glands not imaged in  their entirety. No acute pathology seen within the upper abdomen.     There are advanced emphysematous changes of the lungs. There are  lingular and bilateral lower lobe opacities concerning for pneumonia.  There is no pneumothorax. No endobronchial  lesion identified.     Old right clavicle fracture. There is chronic compression of mid  thoracic vertebra.       Impression:       1. 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  Heterogeneous attenuation of the contrast in the right ventricle as well  as the main pulmonary arteries likely due to mixing of contrast with  nonopacified blood. No discrete pulmonary emboli.   2. Advanced emphysema..  3. Lingular and bilateral lower lobe opacities worrisome for pneumonia  and/or patchy atelectasis. No pneumothorax.  4. Moderate coronary artery calcification.  This report was finalized on 01/05/2020 12:42 by Dr. Abbie Velez MD.    XR Chest 1 View [[id number]] Collected:  01/04/20 0959     Updated:  01/04/20 1004    Narrative:       EXAMINATION: XR CHEST 1 VW-. 1/4/2020 9:59 AM CST     CHEST, ONE VIEW:     HISTORY: Hypoxia     COMPARISON: 01/01/2020, 12/30/2019 and 8/30/2019     A single frontal chest radiograph was obtained.     FINDINGS:     Decreasing perihilar interstitial infiltration observed with mild  residual.     The heart size also decreasing.     Deformity of the right clavicle observed compatible with old fracture  with malunion.                                     Impression:       1. Decreasing perihilar interstitial infiltrates, improving interstitial  pulmonary edema considered.     This report was finalized on 01/04/2020 10:01 by Dr. Kee Hooks MD.    XR Chest 1 View [[id number]] Collected:  01/01/20 2043     Updated:  01/01/20 2047    Narrative:       XR CHEST 1 VW- 1/1/2020 8:30 PM CST     HISTORY: shortness of breath, hypoxia; T79.6XXA-Traumatic ischemia of  muscle, initial encounter; E87.4-Ngjr-rxjbhnrzrp and hyponatremia;  R13.10-Dysphagia, unspecified; Z74.09-Other reduced mobility;  Z74.09-Other reduced mobility       COMPARISON: 12/30/2019.     FINDINGS:   Diffuse airspace opacities are again noted and have not significantly  changed since 12/30/2019. The  cardiomediastinal silhouette and pulmonary  vascularity are unchanged.      The osseous structures and surrounding soft tissues demonstrate no acute  abnormality.       Impression:       1. No significant change in diffuse airspace opacities that are more  prominent in the lower lungs. Findings remain concerning for pneumonia.        This report was finalized on 01/01/2020 20:44 by Dr. Beau Benjamin MD.    CT Abdomen Pelvis Without Contrast [[id number]] Collected:  12/30/19 1007     Updated:  12/30/19 1017    Narrative:       CT ABDOMEN AND PELVIS without contrast 12/30/2019 9:29 AM CST     HISTORY: Abdominal pain     COMPARISON: CT scan dated 11/25/2017      DLP: 399 mGy cm     TECHNIQUE: Noncontrast enhanced images of the abdomen and pelvis  obtained without oral contrast.      FINDINGS:   Dense consolidation in the posterior aspect of the right middle lobe.  There is additional minimal consolidation along the dependent aspect of  the right lower lobe. Minimal consolidation in the left upper lobe  lingula. Underlying emphysema. Advanced coronary atheromatous  calcification. No pleural effusion.      LIVER: Grossly normal without contrast.      BILIARY SYSTEM: The gallbladder is completely decompressed. No  intrahepatic or extrahepatic ductal dilatation.      PANCREAS: No focal pancreatic lesion.      SPLEEN: Unremarkable.      KIDNEYS AND ADRENALS: Adrenal glands are unremarkable. There is a  solitary tiny nonobstructing right inferior pole renal stone. The  kidneys are otherwise unremarkable.  Ureters are decompressed.     RETROPERITONEUM: No mass, lymphadenopathy or hemorrhage.      GI TRACT: The stomach is nondistended. Small bowel is nondilated. Fairly  extensive descending and sigmoid colon diverticulosis. No obvious  inflammatory change along the bowel. Colon is nondistended.     OTHER: No mesenteric adenopathy or mass. No intraperitoneal free fluid  or free air. Dense atheromatous calcification along the  abdominal aorta  and branching iliac arteries. Chronic compression deformities at L1 and  L2. Prior L1 kyphoplasty. No acute bony abnormality.      PELVIS: No mass lesion, fluid collection or significant lymphadenopathy  is seen in the pelvis. The urinary bladder is normal in appearance.       Impression:       1. There are areas of dependent consolidation in both lung bases. The  consolidation in the right lower lobe appears as atelectasis. The  consolidation in the right middle lobe is more nonspecific and could  reflect an area of developing pneumonia.   2. Underlying lung emphysema.  3. No acute intra-abdominal process.  4. Solitary tiny nonobstructing right renal stone.  5. Colonic diverticulosis. No inflammatory change identified along the  bowel. The bowel is nondilated.  6. L1 and L2 level compression deformities are chronic.        This report was finalized on 12/30/2019 10:14 by Dr Tyler Wright, .    XR Chest 1 View [[id number]] Collected:  12/30/19 0719     Updated:  12/30/19 0723    Narrative:       EXAMINATION: XR CHEST 1 VW- 12/30/2019 7:19 AM CST     HISTORY: Cough     COMPARISON: 08/30/2019     FINDINGS:  Heart appears normal in size. Aorta is tortuous with atherosclerotic  calcifications. Airspace opacities are noted in the lung bases  bilaterally. There is associated central bronchial wall thickening.  There is no appreciable pneumothorax or pleural effusion. Pulmonary  vasculature appears grossly normal.       Impression:       Bilateral airspace opacities in the lung bases are  concerning for pneumonia.  This report was finalized on 12/30/2019 07:20 by Dr. Tee Lorenzo MD.    CT Head Without Contrast [[id number]] Collected:  12/30/19 0641     Updated:  12/30/19 0646    Narrative:       EXAMINATION: CT HEAD WO CONTRAST- 12/30/2019 6:41 AM CST     HISTORY: Head trauma, headache, syncope.     DOSE: 1236 mGycm (Automatic exposure control technique was implemented  in an effort to keep the radiation  "dose as low as possible without  compromising image quality)     REPORT:     Spiral CT of the head was performed without intravenous contrast.  Reconstructed coronal and sagittal images are also reviewed.     Comparison: CT head without contrast 11/04/2016.     No evidence of intracranial hemorrhage, mass or mass-effect is  identified. The ventricles and basal cisterns appear within normal  limits. Bone windows show no skull fracture. The visualized paranasal  sinuses and mastoid air cells are normally aerated.       Impression:       Impression: No acute intracranial abnormality.     A preliminary report was provided by the [facility] radiology service.           This report was finalized on 12/30/2019 06:43 by Dr. Nahid Flynn MD.        Interpretation Summary     Echo 12/30/2019  · Left ventricular systolic function is normal. Estimated traction fraction is 56 to 60%.  · Left ventricular diastolic dysfunction.  · Normal right ventricular cavity size and systolic function noted.  · Left atrial cavity size is mildly dilated.  · There is no significant (greater than mild) valvular dysfunction.          Condition on Discharge:   Stable    Physical Exam on Discharge:  /82 (BP Location: Left arm, Patient Position: Lying)   Pulse 73   Temp 97.8 °F (36.6 °C) (Oral)   Resp 18   Ht 193 cm (76\")   Wt 77.2 kg (170 lb 3.2 oz)   SpO2 91%   BMI 20.72 kg/m²   Physical Exam   He looks better groomed today than yesterday  He is appears to be hemodynamically stable  Not in any distress  GEN: Awake, alert, interactive, in NAD.    HEENT: PERRLA, EOMI, Anicteric, Trachea midline  Lungs: diminished but good air entry/flow, no wheezing.  No gross crackles; barrel chested  heart: RRR, +S1/s2, no rub; telemetry showed sinus rhythm at 94 bpm  ABD: soft, nt/nd, +BS, no guarding/rebound  Extremities: atraumatic, no cyanosis, no peripheral edema  Noticeable loss of muscle mass of lower extremities  Skin: no rashes or " lesions  Neuro: AAOx3, no focal deficits  Psych: normal mood & affect         Discharge Disposition:  Home or Self Care    Discharge Medications:     Discharge Medications      New Medications      Instructions Start Date   albuterol sulfate  (90 Base) MCG/ACT inhaler  Commonly known as:  PROVENTIL HFA;VENTOLIN HFA;PROAIR HFA   2 puffs, Inhalation, Every 4 Hours PRN      apixaban 5 MG tablet tablet  Commonly known as:  ELIQUIS   5 mg, Oral, Every 12 Hours Scheduled      aspirin 81 MG chewable tablet   81 mg, Oral, Daily   Start Date:  January 9, 2020     diazePAM 2 MG tablet  Commonly known as:  VALIUM   Take 1 tab daily x 2 days then 1 tablet every other day x 3 doses then stop      docusate sodium 100 MG capsule   100 mg, Oral, 2 Times Daily      fluticasone 50 MCG/ACT nasal spray  Commonly known as:  FLONASE   2 sprays, Each Nare, 2 Times Daily      multivitamin w/minerals tablet   1 tablet, Oral, Daily   Start Date:  January 9, 2020     nicotine 21 MG/24HR patch  Commonly known as:  NICODERM CQ   1 patch, Transdermal, Daily   Start Date:  January 9, 2020     polyethylene glycol pack packet  Commonly known as:  MIRALAX   17 g, Oral, Daily   Start Date:  January 9, 2020     predniSONE 20 MG tablet  Commonly known as:  DELTASONE   Take 2 tabs daily x 2 days then 1 tab x 2 days then 1/2 tab x 2 days         Changes to Medications      Instructions Start Date   metoprolol tartrate 50 MG tablet  Commonly known as:  LOPRESSOR  What changed:    · medication strength  · how much to take  · Another medication with the same name was removed. Continue taking this medication, and follow the directions you see here.   50 mg, Oral, Every 12 Hours Scheduled         Continue These Medications      Instructions Start Date   HYDROcodone-acetaminophen  MG per tablet  Commonly known as:  NORCO   2 tablets, Oral, Every 8 Hours PRN      methocarbamol 750 MG tablet  Commonly known as:  ROBAXIN   750 mg, Oral, 3 Times Daily  PRN      simvastatin 20 MG tablet  Commonly known as:  ZOCOR   20 mg, Oral, Nightly         Stop These Medications    clopidogrel 75 MG tablet  Commonly known as:  PLAVIX            Discharge Diet:   Diet Instructions     Diet: Regular, Cardiac; Thin      Discharge Diet:   Regular  Cardiac       Fluid Consistency:  Thin          Discharge Care Plan / Instructions: Smoking cessation counseling; if decided to smoke, avoid smoking near her oxygen    Activity at Discharge:   Activity Instructions     Activity as Tolerated            Follow-up Appointments:   · PCP in 1 week with recommendation to facilitate pulmonary function testing  · Keep follow-up appointment with Dr. Juarez regarding peripheral arterial disease and finding of superior vena cava stenosis  Test Results Pending at Discharge: None     Man Luis MD  01/08/20  3:09 PM    Time: Greater than 45 minutes      Part of this note may be an electronic transcription/translation of spoken language to printed text using the Dragon Dictation System.

## 2020-01-08 NOTE — PLAN OF CARE
Problem: Patient Care Overview  Goal: Plan of Care Review  Outcome: Ongoing (interventions implemented as appropriate)  Flowsheets (Taken 1/8/2020 1413)  Progress: improving  Plan of Care Reviewed With: patient; spouse  Note:   Pt. Had little trouble with ue exs, but vc's required for reinforcement to complete tasks!

## 2020-01-08 NOTE — DISCHARGE PLACEMENT REQUEST
"Zee Ordonez [phone]  Sascha John SrColby (59 y.o. Male)     Date of Birth Social Security Number Address Home Phone MRN    1960  [address] [place], [place] [address] [phone] [id number]    Shinto Marital Status          Jehovah's witness        Admission Date Admission Type Admitting Provider Attending Provider Department, Room/Bed    19 Emergency Man Luis MD Puertollano, Glenn Riego, MD [address], 476/1    Discharge Date Discharge Disposition Discharge Destination                       Attending Provider:  Man Luis MD    Allergies:  Codeine    Isolation:  Droplet   Infection:  Influenza (19)   Code Status:  CPR    Ht:  193 cm (76\")   Wt:  77.2 kg (170 lb 3.2 oz)    Admission Cmt:  None   Principal Problem:  None                Active Insurance as of 2019     Primary Coverage     Payor Plan Insurance Group Employer/Plan Group    [employer] [place] [employer]      Payor Plan Address Payor Plan Phone Number Payor Plan Fax Number Effective Dates    [address] [address] [phone]  2016 - None Entered    [facility] [id number]       Subscriber Name Subscriber Birth Date Member ID       SASCHA JOHN  1960 [id number]                 Emergency Contacts      (Rel.) Home Phone Work Phone Mobile Phone    SONIA VALLEJO (Sister) -- -- [phone]        [address] [address]  Dept. Phone:  [phone]  Dept. Fax:   Date Ordered: 2020         Patient:  Sascha John Sr. MRN:  [id number]   [facility] [address] :  1960  SSN:    Phone: [phone] Sex:  M     Weight: 77.2 kg (170 lb 3.2 oz)         Ht Readings from Last 1 Encounters:   20 193 cm (76\")         Oxygen Therapy         (Order ID: [id number])    Diagnosis:  Traumatic rhabdomyolysis, initial encounter (CMS/[facility]) (T79.6XXA [ICD-10-CM] 958.6 [ICD-9-CM])   Quantity:  " 1     Delivery Modality: Nasal Cannula  Liters Per Minute: 4  Duration: Continuous  Equipment:  Oxygen Concentrator &  &  Portable Gaseous Oxygen System & Portable Oxygen Contents Gaseous &  Conserving Regulator  Length of Need (99 Months = Lifetime): 99 Months = Lifetime        Authorizing Provider's Phone: [phone]   Verbal Order Mode: Verbal with readback   Authorizing Provider: Man Luis MD  Authorizing Provider's NPI: [id number]     Order Entered By: Indira Vivas RN 1/8/2020  3:06 PM     Electronically signed by:          Julia Herrmann   Respiratory Therapist      Progress Notes   Signed   Date of Service:  01/07/20 1546   Creation Time:  01/07/20 1546            Signed             Show:Clear all  [x]Manual[x]Template[]Copied    Added by:  [x]Julia Herrmann    []Shy for details  Exercise Oximetry     Patient Name:Sascha Oden Sr.   MRN: [id number]   Date: 01/07/20                                                                                                     ROOM AIR BASELINE   SpO2% 84   Heart Rate 71   Blood Pressure       EXERCISE ON ROOM AIR SpO2% EXERCISE ON O2 @ 4 LPM SpO2%   1 MINUTE   1 MINUTE 93   2 MINUTES   2 MINUTES 91   3 MINUTES   3 MINUTES     4 MINUTES   4 MINUTES 91   5 MINUTES   5 MINUTES     6 MINUTES   6 MINUTES                                                                                                                   Distance Walked  Distance Walked 250 feet   Dyspnea (Ron Scale)   Dyspnea (Ron Scale) 3   Fatigue (Ron Scale)   Fatigue (Ron Scale) 0   SpO2% Post Exercise  SpO2% Post Exercise 92   HR Post Exercise  HR Post Exercise 95   Time to Recovery   Time to Recovery 2 minutes      Comments:                        History & Physical      Gustavo Lang MD at 12/30/19 0022                [facility] Medicine Services  HISTORY AND PHYSICAL    Date of Admission: 12/29/2019  Primary  Care Physician: Narendra Camara MD    Subjective     Chief Complaint: Syncope    History of Present Illness  Patient is a 59-year-old white male past medical history of alcohol abuse emphysema full vascular disease.  He presents to the emergency room tonight he apparently drank for 24 ounce beers and then was walking into the den from the kitchen.  He initially told me he tripped fell and struck his head however it sounds more like he had a syncopal episode fell and struck his head.  There was loss of consciousness.  Patient states he has had similar episodes in the past but not been worked up for them.  He presented emergency room was evaluated there CT of his head is negative he does have a small laceration over his eye.  Work-up otherwise was remarkable for having a sodium of 120 which is a new finding.  Patient states he drinks a lot of water approximately 4 L a day.  He also is complaining of cough and congestion cough is been going on for about a month nonproductive but in the last few days it is become productive and patient has had chills.  He denies nausea vomiting or diarrhea.  He also complains of abdominal pain patient states he felt laying on his abdomen at the time he thinks.  On my examination patient is wheezing and has coarse breath sounds bilaterally.  I ordered a flu swab which was positive for flu a.  Patient is being admitted for further evaluation management of syncope hyponatremia and probably COPD exacerbation due to influenza A.  Is also noted to have some evidence of rhabdomyolysis with a CPK of 1281.  Also patient later told the nurse that he been having a lot of dark stool which was loose.        Review of Systems   14 point review of systems negative except for as per HPI    Otherwise complete ROS reviewed and negative except as mentioned in the HPI.    Past Medical History:   Past Medical History:   Diagnosis Date   • Alcohol abuse    • Arthritis    • Blackout spell    • Chronic back  pain 1991    s/p fall    • Emphysema of lung (CMS/[facility])    • Hypercholesterolemia     Statin therapy    • Hypertension    • PAP (pulmonary alveolar proteinosis) (CMS/[facility])    • PVD (peripheral vascular disease) (CMS/[facility])     Followed by Dr. Juarez; ASA, Plavix, leg revascularization    • Tobacco abuse     1 PPD     Past Surgical History:  Past Surgical History:   Procedure Laterality Date   • ANGIOPLASTY     • AORTAGRAM Right 2/11/2019    Procedure: RIGHT LOWER EXTREMITY ANGIOGRAM;  Surgeon: Jitendra Juarez DO;  [facility]: [facility] [place] OR ;  Service: Vascular   • APPENDECTOMY     • BACK SURGERY     • CAROTID STENT Bilateral    • HAND SURGERY Right    • LEG REVASCULARIZATION Bilateral      Social History:  reports that he has been smoking cigarettes. He has been smoking about 1.50 packs per day. He has never used smokeless tobacco. He reports that he drinks alcohol. He reports that he does not use drugs.    Family History: family history includes Arrhythmia in his paternal grandmother; Cancer in his paternal grandfather, paternal grandmother, and another family member; Diabetes in his mother and another family member; Heart attack in his maternal grandfather; Heart disease in an other family member; Hypertension in his mother; Kidney disease in his father and another family member; Stroke in an other family member.       Allergies:  Allergies   Allergen Reactions   • Codeine GI Intolerance     Medications:  Prior to Admission medications    Medication Sig Start Date End Date Taking? Authorizing Provider   clopidogrel (PLAVIX) 75 MG tablet TAKE 1 TABLET BY MOUTH DAILY 6/4/19  Yes Ijeoma Johns APRN   HYDROcodone-acetaminophen (NORCO)  MG per tablet TK 2 TS PO TID 10/31/16  Yes Provider, Historical, MD   methocarbamol (ROBAXIN) 750 MG tablet TK 1 T PO TID 9/27/16  Yes Provider, Historical, MD   metoprolol tartrate (LOPRESSOR) 25 MG tablet Take 0.5 tablets by mouth Every 12 (Twelve) Hours. 11/7/16  Yes  "Narendra Camara MD   simvastatin (ZOCOR) 20 MG tablet TK 1 T PO QD 9/27/16  Yes Provider, MD Jammie   clopidogrel (PLAVIX) 75 MG tablet Take 1 tablet by mouth Daily. 11/17/17   Ijeoma Johns APRN     Objective     Vital Signs: /76 (BP Location: Left arm, Patient Position: Lying)   Pulse 95   Temp 97.3 °F (36.3 °C) (Oral)   Resp 24   Ht 193 cm (76\")   Wt 82.6 kg (182 lb 3.2 oz)   SpO2 92%   BMI 22.18 kg/m²    Physical Exam  Gen.: Moderately ill-appearing white male no acute distress HEENT: Atraumatic, normocephalic.  Pupils equal, round, and reactive to light. Extraocular movements intact.  Sclerae anicteric.  TMs negative.  Mucous membranes moist.  Neck is supple without lymphadenopathy.  No JVD is noted.  No carotid bruits are auscultated.  Oropharynx is without erythema or exudate.   Chest: Wheezes and coarse breath sounds bilaterally  CV: Regular rate and rhythm.  Normal S1-S2.  No gallops, murmurs. or rubs.  Abdomen: Soft, diffusely, nondistended.  Active bowel sounds,  No hepatosplenomegaly.  No masses.  No hernias.  Extremities: No clubbing, edema, or cyanosis.  Capillary refill is normal.  Pulses are 2+ and symmetric at radial and dorsalis pedis.  Posterior tibial pulses are intact and 2+ palpable.  Neuro: Patient is awake, alert, and oriented ×3.  Cranial nerves II through XII are grossly intact.  Motor is 5 out of 5 bilaterally.  DTRs are 2+ and symmetric bilaterally. Sensory exam is nonfocal  Skin: Warm, dry, and intact.  No evidence of breakdown.  Sensorium: Somewhat slow addled at times    Nursing notes and vital signs reviewed        Results Reviewed:  Lab Results (last 24 hours)     Procedure Component Value Units Date/Time    Osmolality, Serum [[id number]]  (Abnormal) Collected:  12/30/19 0254    Specimen:  Blood Updated:  12/30/19 0354     Osmolality 249 mOsm/kg     Procalcitonin [[id number]]  (Abnormal) Collected:  12/30/19 0254    Specimen:  Blood Updated:  12/30/19 0345     " "Procalcitonin 0.34 ng/mL     Narrative:       As a Marker for Sepsis (Non-Neonates):   1. <0.5 ng/mL represents a low risk of severe sepsis and/or septic shock.  1. >2 ng/mL represents a high risk of severe sepsis and/or septic shock.    As a Marker for Lower Respiratory Tract Infections that require antibiotic therapy:  PCT on Admission     Antibiotic Therapy             6-12 Hrs later  > 0.5                Strongly Recommended            >0.25 - <0.5         Recommended  0.1 - 0.25           Discouraged                   Remeasure/reassess PCT  <0.1                 Strongly Discouraged          Remeasure/reassess PCT      As 28 day mortality risk marker: \"Change in Procalcitonin Result\" (> 80 % or <=80 %) if Day 0 (or Day 1) and Day 4 values are available. Refer to http://www.[facility] City[facility]era Breadpct-calculator.com/   Change in PCT <=80 %   A decrease of PCT levels below or equal to 80 % defines a positive change in PCT test result representing a higher risk for 28-day all-cause mortality of patients diagnosed with severe sepsis or septic shock.  Change in PCT > 80 %   A decrease of PCT levels of more than 80 % defines a negative change in PCT result representing a lower risk for 28-day all-cause mortality of patients diagnosed with severe sepsis or septic shock.                  Amylase [[id number]]  (Normal) Collected:  12/30/19 0254    Specimen:  Blood Updated:  12/30/19 0340     Amylase 85 U/L     Lipase [[id number]]  (Abnormal) Collected:  12/30/19 0254    Specimen:  Blood Updated:  12/30/19 0340     Lipase 62 U/L     Comprehensive Metabolic Panel [[id number]]  (Abnormal) Collected:  12/30/19 0254    Specimen:  Blood Updated:  12/30/19 0340     Glucose 111 mg/dL      BUN 11 mg/dL      Creatinine 0.53 mg/dL      Sodium 120 mmol/L      Potassium 3.5 mmol/L      Chloride 83 mmol/L      CO2 23.0 mmol/L      Calcium 7.7 mg/dL      Total Protein 6.2 g/dL      Albumin 3.40 g/dL      ALT (SGPT) 66 U/L      AST (SGOT) 132 U/L      " Alkaline Phosphatase 50 U/L      Total Bilirubin 0.3 mg/dL      eGFR Non African Amer >150 mL/min/1.73      Globulin 2.8 gm/dL      A/G Ratio 1.2 g/dL      BUN/Creatinine Ratio 20.8     Anion Gap 14.0 mmol/L     Narrative:       GFR Normal >60  Chronic Kidney Disease <60  Kidney Failure <15      CK [[id number]]  (Abnormal) Collected:  12/30/19 0254    Specimen:  Blood Updated:  12/30/19 0340     Creatine Kinase 1,281 U/L     Troponin [[id number]]  (Normal) Collected:  12/30/19 0254    Specimen:  Blood Updated:  12/30/19 0340     Troponin T <0.010 ng/mL     Narrative:       Troponin T Reference Range:  <= 0.03 ng/mL-   Negative for AMI  >0.03 ng/mL-     Abnormal for myocardial necrosis.  Clinicians would have to utilize clinical acumen, EKG, Troponin and serial changes to determine if it is an Acute Myocardial Infarction or myocardial injury due to an underlying chronic condition.     Phosphorus [[id number]]  (Abnormal) Collected:  12/30/19 0254    Specimen:  Blood Updated:  12/30/19 0340     Phosphorus 2.2 mg/dL     Magnesium [[id number]]  (Normal) Collected:  12/30/19 0254    Specimen:  Blood Updated:  12/30/19 0340     Magnesium 1.9 mg/dL     Lactic Acid, Plasma [[id number]]  (Normal) Collected:  12/30/19 0254    Specimen:  Blood Updated:  12/30/19 0339     Lactate 1.1 mmol/L     CBC Auto Differential [[id number]]  (Abnormal) Collected:  12/30/19 0254    Specimen:  Blood Updated:  12/30/19 0319     WBC 4.90 10*3/mm3      RBC 4.77 10*6/mm3      Hemoglobin 15.3 g/dL      Hematocrit 41.7 %      MCV 87.4 fL      MCH 32.1 pg      MCHC 36.7 g/dL      RDW 11.9 %      RDW-SD 38.7 fl      MPV 9.9 fL      Platelets 87 10*3/mm3      Neutrophil % 79.6 %      Lymphocyte % 10.2 %      Monocyte % 9.8 %      Eosinophil % 0.0 %      Basophil % 0.2 %      Immature Grans % 0.2 %      Neutrophils, Absolute 3.90 10*3/mm3      Lymphocytes, Absolute 0.50 10*3/mm3      Monocytes, Absolute 0.48 10*3/mm3      Eosinophils, Absolute 0.00 10*3/mm3       Basophils, Absolute 0.01 10*3/mm3      Immature Grans, Absolute 0.01 10*3/mm3      nRBC 0.0 /100 WBC     Blood Gas, Arterial [[id number]]  (Abnormal) Collected:  12/30/19 0205    Specimen:  Arterial Blood Updated:  12/30/19 0231     Site Left Radial     Jr's Test Positive     pH, Arterial 7.490 pH units      Comment: 83 Value above reference range        pCO2, Arterial 31.5 mm Hg      Comment: 84 Value below reference range        pO2, Arterial 51.5 mm Hg      Comment: 85 Value below critical limit        HCO3, Arterial 24.0 mmol/L      Base Excess, Arterial 1.5 mmol/L      O2 Saturation, Arterial 89.9 %      Comment: 84 Value below reference range        Temperature 37.0 C      Barometric Pressure for Blood Gas 744 mmHg      Modality Nasal Cannula     Comment: Corrected result. Previous result was Room Air on 12/30/2019 at 0222 CST.        Flow Rate 3 lpm      Comment: Appended report. These results have been appended to a previously final verified report.        Ventilator Mode NA     Notified [facility] [id number]     Notified By [id number]     Notified Time 12/30/2019 02:26     Collected by [id number]     Comment: Meter: [id number]     :  [id number]       Narrative:       Corrected for Pat Raymond RRT.    Influenza Antigen, Rapid - Swab, Nasopharynx [[id number]]  (Abnormal) Collected:  12/30/19 0033    Specimen:  Swab from Nasopharynx Updated:  12/30/19 0128     Influenza A Ag, EIA Positive     Influenza B Ag, EIA Negative    Narrative:       Recommend confirmation of negative results by viral culture or molecular assay.    [place] Draw [[id number]] Collected:  12/29/19 1956    Specimen:  Blood Updated:  12/29/19 2100    Narrative:       The following orders were created for panel order [place] Draw.  Procedure                               Abnormality         Status                     ---------                               -----------         ------                     Light Blue Top[[id number]]                                    Final result               Green Top (Gel)[[id number]]                                  Final result               Lavender Top[[id number]]                                     Final result               Red Top[[id number]]                                          Final result                 Please view results for these tests on the individual orders.    Light Blue Top [[id number]] Collected:  12/29/19 1956    Specimen:  Blood Updated:  12/29/19 2100     Extra Tube hold for add-on     Comment: Auto resulted       Lavender Top [[id number]] Collected:  12/29/19 1956    Specimen:  Blood Updated:  12/29/19 2100     Extra Tube hold for add-on     Comment: Auto resulted       Red Top [[id number]] Collected:  12/29/19 1956    Specimen:  Blood Updated:  12/29/19 2100     Extra Tube Hold for add-ons.     Comment: Auto resulted.       Green Top (Gel) [[id number]] Collected:  12/29/19 1956    Specimen:  Blood Updated:  12/29/19 2100     Extra Tube Hold for add-ons.     Comment: Auto resulted.       CBC & Differential [[id number]] Collected:  12/29/19 1957    Specimen:  Blood Updated:  12/29/19 2043    Narrative:       The following orders were created for panel order CBC & Differential.  Procedure                               Abnormality         Status                     ---------                               -----------         ------                     CBC Auto Differential[[id number]]        Abnormal            Final result                 Please view results for these tests on the individual orders.    CBC Auto Differential [[id number]]  (Abnormal) Collected:  12/29/19 1957    Specimen:  Blood Updated:  12/29/19 2043     WBC 6.30 10*3/mm3      RBC 5.00 10*6/mm3      Hemoglobin 16.1 g/dL      Hematocrit 44.6 %      MCV 89.2 fL      MCH 32.2 pg      MCHC 36.1 g/dL      RDW 12.1 %      RDW-SD 39.8 fl      MPV 10.1 fL      Platelets 97 10*3/mm3      Neutrophil % 78.6 %      Lymphocyte % 11.3 %      Monocyte % 9.2 %       Eosinophil % 0.0 %      Basophil % 0.3 %      Immature Grans % 0.6 %      Neutrophils, Absolute 4.95 10*3/mm3      Lymphocytes, Absolute 0.71 10*3/mm3      Monocytes, Absolute 0.58 10*3/mm3      Eosinophils, Absolute 0.00 10*3/mm3      Basophils, Absolute 0.02 10*3/mm3      Immature Grans, Absolute 0.04 10*3/mm3      nRBC 0.0 /100 WBC     Scan Slide [[id number]] Collected:  12/29/19 1957    Specimen:  Blood Updated:  12/29/19 2043     RBC Morphology Normal     WBC Morphology Normal     Platelet Estimate Decreased    Urine Drug Screen - Urine, Clean Catch [[id number]]  (Normal) Collected:  12/29/19 2015    Specimen:  Urine, Clean Catch Updated:  12/29/19 2035     THC, Screen, Urine Negative     Phencyclidine (PCP), Urine Negative     Cocaine Screen, Urine Negative     Methamphetamine, Ur Negative     Opiate Screen Negative     Amphetamine Screen, Urine Negative     Benzodiazepine Screen, Urine Negative     Tricyclic Antidepressants Screen Negative     Methadone Screen, Urine Negative     Barbiturates Screen, Urine Negative     Oxycodone Screen, Urine Negative     Propoxyphene Screen Negative     Buprenorphine, Screen, Urine Negative    Narrative:       Cutoff For Drugs Screened:    Amphetamines               500 ng/ml  Barbiturates               200 ng/ml  Benzodiazepines            150 ng/ml  Cocaine                    150 ng/ml  Methadone                  200 ng/ml  Opiates                    100 ng/ml  Phencyclidine               25 ng/ml  THC                            50 ng/ml  Methamphetamine            500 ng/ml  Tricyclic Antidepressants  300 ng/ml  Oxycodone                  100 ng/ml  Propoxyphene               300 ng/ml  Buprenorphine               10 ng/ml    The normal value for all drugs tested is negative. This report includes unconfirmed screening results, with the cutoff values listed, to be used for medical treatment purposes only.  Unconfirmed results must not be used for non-medical purposes  such as employment or legal testing.  Clinical consideration should be applied to any drug of abuse test, particularly when unconfirmed results are used.      Urinalysis With Microscopic If Indicated (No Culture) - Urine, Clean Catch [[id number]]  (Abnormal) Collected:  12/29/19 2015    Specimen:  Urine, Clean Catch Updated:  12/29/19 2028     Color, UA Dark Yellow     Appearance, UA Clear     pH, UA <=5.0     Specific Gravity, UA 1.026     Glucose, UA Negative     Ketones, UA 15 mg/dL (1+)     Bilirubin, UA Negative     Blood, UA Negative     Protein, UA Trace     Leuk Esterase, UA Negative     Nitrite, UA Negative     Urobilinogen, UA 1.0 E.U./dL    Narrative:       Urine microscopic not indicated.    Comprehensive Metabolic Panel [[id number]]  (Abnormal) Collected:  12/29/19 1957    Specimen:  Blood Updated:  12/29/19 2023     Glucose 88 mg/dL      BUN 11 mg/dL      Creatinine 0.70 mg/dL      Sodium 121 mmol/L      Potassium 3.7 mmol/L      Chloride 80 mmol/L      CO2 23.0 mmol/L      Calcium 8.2 mg/dL      Total Protein 7.2 g/dL      Albumin 3.90 g/dL      ALT (SGPT) 75 U/L      AST (SGOT) 146 U/L      Alkaline Phosphatase 57 U/L      Total Bilirubin 0.3 mg/dL      eGFR Non African Amer 116 mL/min/1.73      Globulin 3.3 gm/dL      A/G Ratio 1.2 g/dL      BUN/Creatinine Ratio 15.7     Anion Gap 18.0 mmol/L     Narrative:       GFR Normal >60  Chronic Kidney Disease <60  Kidney Failure <15      Ethanol [[id number]] Collected:  12/29/19 1957    Specimen:  Blood Updated:  12/29/19 2023     Ethanol % 0.084 %     Narrative:       Not for legal purposes. Chain of Custody not followed.     CK [[id number]]  (Abnormal) Collected:  12/29/19 1957    Specimen:  Blood Updated:  12/29/19 2023     Creatine Kinase 1,335 U/L         Imaging Results (Last 24 Hours)     Procedure Component Value Units Date/Time    XR Chest 1 View [[id number]] Resulted:  12/30/19 0120     Updated:  12/30/19 0121    CT Head Without Contrast [[id number]]  Resulted:  12/29/19 2156     Updated:  12/29/19 2206        I have personally reviewed and interpreted the radiology studies and ECG obtained at time of admission.     Assessment / Plan     Assessment:   Active Hospital Problems    Diagnosis   • Hyponatremia   • Alcoholism /alcohol abuse (CMS/HCC)   • Syncope and collapse   • Diarrhea   • Cough   • COPD with acute exacerbation (CMS/HCC)   • Rhabdomyolysis   • Hypertension   1.  Syncope plans to admit patient will hydrate with saline also will check 2D echo carotid ultrasound.  Monitor serial neuro exams as well.  Believe this is multifactorial probably due to alcohol and influenza.  2.  Influenza A acute admit patient culture blood and sputum will start  Pulmonary toilet with bronchodilators and Solu-Medrol Mucinex.  [place] cover him with Levaquin also due to possibility of acute bronchitis as well.  Will start Tamiflu and put in isolation.  Hydrate with IV saline.  3.  Hyponatremia check urine serum osmolality as well as urine electrolytes.  Will give normal saline at 100 an hour and put on free water restriction to 1500 cc a day.  Serial BMPs.  4.  Rhabdomyolysis this is mild however probably needs IV fluids we will give a normal saline recheck CK in the morning.  5.  Diarrhea check stool for occult blood if he has more than 1 or 2 stools loose and ask 24 hours will check stool for C. difficile and PCR.  6.  Alcohol abuse [facility] protocol.  7.  Tobacco abuse encourage smoking cessation nicotine patch.  Seen after midnight         Code Status: Full     I discussed the patient's findings and my recommendations with the patient and his nurse.  Patient designates his sister as a surrogate healthcare decision maker should he not be able to speak for himself.    Estimated length of stay greater than 2 nights.    Gustavo Lang MD   12/30/19   3:57 AM              Electronically signed by Gustavo Lang MD at 12/30/19 8099       Discharge Summary    No notes of this  type exist for this encounter.

## 2020-01-08 NOTE — PROGRESS NOTES
RT Nebulizer Protocol    Assessment tool to be used for patients with existing breathing treatments ordered by hospitalists                                                                  0  1  2  3  4      Respiratory History   No Smoking      Smoking History      1 Pack/Day      Pulmonary Disease   x   Exacerbation        Respiratory Rate   Normal   x   20-25      Dyspneic      Accessory Muscles      Severe Dyspnea        Breath Sounds   Clear      Crackles   x   Crackles/ Rhonchi      Wheezing      Absent/ Severe Wheezing        Chest   X-ray   Clear      1 Lobe Infiltration/ Consolidation/ PE      2 Lobe Same Lung Infiltration/ Consolidation/ PE       2 Lobe Infiltration/ Both Lungs/ Consolidation/ PE   x   Both Lungs/ More Than 1 Lobe/ Atelectasis/ Consolidation/  PE        Cough   Strong Non- Productive   x   Excessive Secretions/ Strong Cough      Excessive Secretions/ Weak Cough      Thick Bronchial Secretions/ Weak Cough      Thick Bronchial Secretions/ No Cough        Total Patient Score =  7  0-4=Q4 PRN  5-9=TID and Q4 PRN  10-14=QID and Q3 PRN  15-19=Q4 and Q2 PRN  20=Q3 and Q2 PRN    Bronchopulmonary Hygiene (CPT)   Q4 ATC Copious secretions, dyspnea, unable to sleep, mucus plug    QID & Q4 PRN Moderate secretions    TID Small amounts of secretions w/ poor cough and history of secretions    BID Unable to deep breathe and cough spontaneously       Lung Expansion Therapy (PEP)   Q4 & PRN at night Severe atelectasis, poor oxygenation    QID  High risk for persistent atelectasis, existence of same    TID At risk for developing atelectasis    BID Unable to deep breathe and cough spontaneously     Instruct, 1 follow up Patients able to perform well on their own        Continue with TID and Q4 PRN Duoneb tx per protocol.  Reevaluate in 24 hours.

## 2020-01-08 NOTE — PROGRESS NOTES
Continued Stay Note  [facility]     Patient Name: Sascha Oden Sr.  MRN: [id number]  Today's Date: 1/8/2020    Admit Date: 12/29/2019    Discharge Plan     Row Name 01/08/20 1511       Plan    Plan  HOME    Patient/Family in Agreement with Plan  yes    Final Discharge Disposition Code  01 - home or self-care    Final Note  Pt is being discharged home today.  Pt needs O2 set up for home and with options available in county of residence he prefers [facility].  Called Neisha at [facility] and inform of d/c status and need for protable O2 brought to room before he leaves today. [phone]        Discharge Codes    No documentation.       Expected Discharge Date and Time     Expected Discharge Date Expected Discharge Time    Jan 8, 2020             ROCHELLE Lerma

## 2020-01-09 ENCOUNTER — READMISSION MANAGEMENT (OUTPATIENT)
Dept: CALL CENTER | Facility: HOSPITAL | Age: 60
End: 2020-01-09

## 2020-01-09 NOTE — THERAPY DISCHARGE NOTE
Acute Care - Occupational Therapy Discharge Summary  [facility]     Patient Name: Sascha Oden Sr.  : 1960  MRN: [id number]    Today's Date: 2020  Onset of Illness/Injury or Date of Surgery: 19    Date of Referral to OT: 19  Referring Physician: Dr. Lang      Admit Date: 2019        OT Recommendation and Plan    Visit Dx:    ICD-10-CM ICD-9-CM   1. Traumatic rhabdomyolysis, initial encounter (CMS/HCC) T79.6XXA 958.6   2. Hyponatremia E87.1 276.1   3. Dysphagia, unspecified type R13.10 787.20   4. Impaired functional mobility and activity tolerance Z74.09 V49.89   5. Impaired mobility and ADLs Z74.09 799.89   6. Other emphysema (CMS/HCC) J43.8 492.8   7. Acute respiratory failure with hypoxia (CMS/HCC) J96.01 518.81   8. Alcoholism /alcohol abuse (CMS/HCC) F10.20 303.90   9. COPD with acute exacerbation (CMS/HCC) J44.1 491.21               Rehab Goal Summary     Row Name 20 0700             Dressing Goal 1 (OT)    Activity/Assistive Device (Dressing Goal 1, OT)  dressing skills, all  -TS      [place]/Cues Needed (Dressing Goal 1, OT)  independent  -TS      Time Frame (Dressing Goal 1, OT)  10 days  -TS      Progress/Outcome (Dressing Goal 1, OT)  goal not met  -TS         Toileting Goal 1 (OT)    Activity/Device (Toileting Goal 1, OT)  toileting skills, all  -TS      [place] Level/Cues Needed (Toileting Goal 1, OT)  independent  -TS      Time Frame (Toileting Goal 1, OT)  10 days  -TS      Progress/Outcome (Toileting Goal 1, OT)  goal not met  -TS         Strength Goal 1 (OT)    Strength Goal 1 (OT)  Pt will completed 10 reps of BUE HEP to increase strength to 4/5 for improved ADL independence.   -TS      Time Frame (Strength Goal 1, OT)  10 days  -TS      Progress/Outcome (Strength Goal 1, OT)  goal not met  -TS        User Key  (r) = Recorded By, (t) = Taken By, (c) = Cosigned By    Initials Name Provider Type Discipline    TS Noelle Stack, SHERRIE/MIQUEL Occupational  Therapy Assistant OT          Outcome Measures     Row Name 01/08/20 1303 01/07/20 0903          How much help from another is currently needed...    Putting on and taking off regular lower body clothing?  2  -CJ  2  -CJ     Bathing (including washing, rinsing, and drying)  2  -CJ  2  -CJ     Toileting (which includes using toilet bed pan or urinal)  2  -CJ  2  -CJ     Putting on and taking off regular upper body clothing  3  -CJ  3  -CJ     Taking care of personal grooming (such as brushing teeth)  3  -CJ  3  -CJ     Eating meals  4  -CJ  4  -CJ     AM-PAC 6 Clicks Score (OT)  16  -  16  -CJ        Functional Assessment    Outcome Measure Options  AM-PAC 6 Clicks Daily Activity (OT)  -CJ  AM-PAC 6 Clicks Daily Activity (OT)  -       User Key  (r) = Recorded By, (t) = Taken By, (c) = Cosigned By    Initials Name Provider Type     Isacc Hooks COTA/L Occupational Therapy Assistant          Therapy Suggested Charges     Code   Minutes Charges    70553 (CPT®) Hc Ot Neuromusc Re Education Ea 15 Min      27421 (CPT®) Hc Ot Ther Proc Ea 15 Min 15 1    89964 (CPT®) Hc Ot Therapeutic Act Ea 15 Min 10 1    04905 (CPT®) Hc Ot Manual Therapy Ea 15 Min      50305 (CPT®) Hc Ot Iontophoresis Ea 15 Min      65804 (CPT®) Hc Ot Elec Stim Ea-Per 15 Min      07758 (CPT®) Hc Ot Ultrasound Ea 15 Min      98001 (CPT®) Hc Ot Self Care/Mgmt/Train Ea 15 Min      Total  25 2              OT Discharge Summary  Reason for Discharge: Discharge from facility  Outcomes Achieved: Refer to plan of care for updates on goals achieved  Discharge Destination: Home with assist      ELAINE Sommer  1/9/2020

## 2020-01-09 NOTE — THERAPY DISCHARGE NOTE
Acute Care - Physical Therapy Discharge Summary  [facility]       Patient Name: Sascha Oden Sr.  : 1960  MRN: [id number]    Today's Date: 2020  Onset of Illness/Injury or Date of Surgery: 19       Referring Physician: Dr. Lang      Admit Date: 2019      PT Recommendation and Plan    Visit Dx:    ICD-10-CM ICD-9-CM   1. Traumatic rhabdomyolysis, initial encounter (CMS/HCC) T79.6XXA 958.6   2. Hyponatremia E87.1 276.1   3. Dysphagia, unspecified type R13.10 787.20   4. Impaired functional mobility and activity tolerance Z74.09 V49.89   5. Impaired mobility and ADLs Z74.09 799.89   6. Other emphysema (CMS/HCC) J43.8 492.8   7. Acute respiratory failure with hypoxia (CMS/HCC) J96.01 518.81   8. Alcoholism /alcohol abuse (CMS/HCC) F10.20 303.90   9. COPD with acute exacerbation (CMS/HCC) J44.1 491.21       Outcome Measures     Row Name 20 1303 20 0903          How much help from another is currently needed...    Putting on and taking off regular lower body clothing?  2  -CJ  2  -CJ     Bathing (including washing, rinsing, and drying)  2  -CJ  2  -CJ     Toileting (which includes using toilet bed pan or urinal)  2  -CJ  2  -CJ     Putting on and taking off regular upper body clothing  3  -CJ  3  -CJ     Taking care of personal grooming (such as brushing teeth)  3  -CJ  3  -CJ     Eating meals  4  -CJ  4  -CJ     AM-PAC 6 Clicks Score (OT)  16  -  16  -        Functional Assessment    Outcome Measure Options  AM-PAC 6 Clicks Daily Activity (OT)  -CJ  AM-PAC 6 Clicks Daily Activity (OT)  -       User Key  (r) = Recorded By, (t) = Taken By, (c) = Cosigned By    Initials Name Provider Type    Isacc Armendariz COTA/L Occupational Therapy Assistant              Rehab Goal Summary     Row Name 20 1155 20 0700          Bed Mobility Goal 1 (PT)    Activity/Assistive Device (Bed Mobility Goal 1, PT)  bed mobility activities, all  -MF  --     [place] Level/Cues  Needed (Bed Mobility Goal 1, PT)  independent  -MF  --     Time Frame (Bed Mobility Goal 1, PT)  long term goal (LTG);by discharge  -MF  --     Progress/Outcomes (Bed Mobility Goal 1, PT)  goal met  -MF  --        Transfer Goal 1 (PT)    Activity/Assistive Device (Transfer Goal 1, PT)  sit-to-stand/stand-to-sit;bed-to-chair/chair-to-bed  -MF  --     [place] Level/Cues Needed (Transfer Goal 1, PT)  independent  -MF  --     Time Frame (Transfer Goal 1, PT)  long term goal (LTG);by discharge  -MF  --     Progress/Outcome (Transfer Goal 1, PT)  goal met  -MF  --        Gait Training Goal 1 (PT)    Activity/Assistive Device (Gait Training Goal 1, PT)  gait (walking locomotion)  -MF  --     [place] Level (Gait Training Goal 1, PT)  independent  -MF  --     Distance (Gait Goal 1, PT)  200ft  -MF  --     Time Frame (Gait Training Goal 1, PT)  long term goal (LTG);by discharge  -MF  --     Progress/Outcome (Gait Training Goal 1, PT)  goal partially met  -MF  --        Dressing Goal 1 (OT)    Activity/Assistive Device (Dressing Goal 1, OT)  --  dressing skills, all  -TS     [place]/Cues Needed (Dressing Goal 1, OT)  --  independent  -TS     Time Frame (Dressing Goal 1, OT)  --  10 days  -TS     Progress/Outcome (Dressing Goal 1, OT)  --  goal not met  -TS        Toileting Goal 1 (OT)    Activity/Device (Toileting Goal 1, OT)  --  toileting skills, all  -TS     [place] Level/Cues Needed (Toileting Goal 1, OT)  --  independent  -TS     Time Frame (Toileting Goal 1, OT)  --  10 days  -TS     Progress/Outcome (Toileting Goal 1, OT)  --  goal not met  -TS        Strength Goal 1 (OT)    Strength Goal 1 (OT)  --  Pt will completed 10 reps of BUE HEP to increase strength to 4/5 for improved ADL independence.   -TS     Time Frame (Strength Goal 1, OT)  --  10 days  -TS     Progress/Outcome (Strength Goal 1, OT)  --  goal not met  -TS       User Key  (r) = Recorded By, (t) = Taken By, (c) = Cosigned By    Initials  Name Provider Type Discipline    TS Noelle Stack, BALDERAS/L Occupational Therapy Assistant OT    Urvashi Neal, YULY Physical Therapy Assistant PT              PT Discharge Summary  Anticipated Discharge Disposition (PT): home  Reason for Discharge: Discharge from facility  Outcomes Achieved: Patient able to partially acheive established goals  Discharge Destination: Home      Urvashi Silva PTA   1/9/2020

## 2020-01-09 NOTE — OUTREACH NOTE
Prep Survey      Responses   Facility patient discharged from?  [place]   Is patient eligible?  Yes   Discharge diagnosis  Syncope, alcohol abuse, Flu A, COPD with AE, Rhabdomyolysis, HTN   Does the patient have one of the following disease processes/diagnoses(primary or secondary)?  COPD/Pneumonia   Does the patient have Home health ordered?  Yes   What is the Home health agency?   [facility]   Is there a DME ordered?  Yes   What DME was ordered?  [employer] for home O2   Comments regarding appointments  See AVS   Prep survey completed?  Yes          Theresa Fernandez RN

## 2020-01-11 ENCOUNTER — READMISSION MANAGEMENT (OUTPATIENT)
Dept: CALL CENTER | Facility: HOSPITAL | Age: 60
End: 2020-01-11

## 2020-01-11 NOTE — OUTREACH NOTE
COPD/PN Week 1 Survey      Responses   Facility patient discharged from?  [place]   Does the patient have one of the following disease processes/diagnoses(primary or secondary)?  COPD/Pneumonia   Is there a successful TCM telephone encounter documented?  No   Was the primary reason for admission:  COPD exacerbation   Week 1 attempt successful?  Yes   Call start time  0942   Call end time  0949   Discharge diagnosis  Syncope, alcohol abuse, Flu A, COPD with AE, Rhabdomyolysis, HTN   Meds reviewed with patient/caregiver?  Yes   Is the patient having any side effects they believe may be caused by any medication additions or changes?  No   Does the patient have all medications ordered at discharge?  Yes   Is the patient taking all medications as directed (includes completed medication regime)?  Yes   Does the patient have a primary care provider?   Yes   Does the patient have an appointment with their PCP or pulmonologist within 7 days of discharge?  No   Comments regarding PCP  Make followup with Dr Camara in next week. Patient verbalized understanding.    What is preventing the patient from scheduling follow up appointments within 7 days of discharge?  Haven't had time   Nursing Interventions  Verified appointment date/time/provider, Advised patient to make appointment   Has the patient kept scheduled appointments due by today?  N/A   What is the Home health agency?   [facility]   Has home health visited the patient within 72 hours of discharge?  Yes   What DME was ordered?  [employer] for home O2   Has all DME been delivered?  Yes   Psychosocial issues?  No   Did the patient receive a copy of their discharge instructions?  Yes   Nursing interventions  Reviewed instructions with patient   What is the patient's perception of their health status since discharge?  Improving   Nursing Interventions  Nurse provided patient education   Are the patient's immunizations up to date?   Yes   Is the patient/caregiver able to teach back the  hierarchy of who to call/visit for symptoms/problems? PCP, Specialist, Home health nurse, Urgent Care, ED, 911  Yes   Is the patient able to teach back COPD zones?  No   Patient reports what zone on this call?  Yellow Zone   Week 1 call completed?  Yes   Revoked  No further contact(revokes)-requires comment   Graduated/Revoked comments  Patient slurring speech in phone. Wants no further calls.           Gustabo Paniagua RN

## 2020-02-13 DIAGNOSIS — I73.9 PAD (PERIPHERAL ARTERY DISEASE) (HCC): ICD-10-CM

## 2020-02-13 DIAGNOSIS — I65.23 BILATERAL CAROTID ARTERY STENOSIS: Primary | ICD-10-CM

## 2020-03-11 LAB
AMPHETAMINE SCREEN, URINE: NEGATIVE
BARBITURATE SCREEN URINE: NEGATIVE
BENZODIAZEPINE SCREEN, URINE: NEGATIVE
CANNABINOID SCREEN URINE: NEGATIVE
COCAINE METABOLITE SCREEN URINE: NEGATIVE
Lab: NORMAL
OPIATE SCREEN URINE: NEGATIVE

## 2020-03-17 ENCOUNTER — HOSPITAL ENCOUNTER (OUTPATIENT)
Dept: ULTRASOUND IMAGING | Facility: HOSPITAL | Age: 60
Discharge: HOME | End: 2020-03-17
Admitting: NURSE PRACTITIONER

## 2020-03-17 ENCOUNTER — TELEPHONE (OUTPATIENT)
Dept: VASCULAR SURGERY | Facility: CLINIC | Age: 60
End: 2020-03-17

## 2020-03-17 ENCOUNTER — HOSPITAL ENCOUNTER (OUTPATIENT)
Dept: ULTRASOUND IMAGING | Facility: HOSPITAL | Age: 60
Discharge: HOME | End: 2020-03-17

## 2020-03-17 DIAGNOSIS — I65.23 BILATERAL CAROTID ARTERY STENOSIS: ICD-10-CM

## 2020-03-17 DIAGNOSIS — I73.9 PAD (PERIPHERAL ARTERY DISEASE) (HCC): ICD-10-CM

## 2020-03-17 PROCEDURE — 93923 UPR/LXTR ART STDY 3+ LVLS: CPT | Performed by: SURGERY

## 2020-03-17 PROCEDURE — 93923 UPR/LXTR ART STDY 3+ LVLS: CPT

## 2020-03-17 PROCEDURE — 93880 EXTRACRANIAL BILAT STUDY: CPT

## 2020-03-17 PROCEDURE — 93880 EXTRACRANIAL BILAT STUDY: CPT | Performed by: SURGERY

## 2020-03-17 NOTE — TELEPHONE ENCOUNTER
Spoke to pt about testing at [facility] at 1230pm today (3/17) and appt 3/18 pt expressed understanding. Pt answered negative to all COVID-19 screening questions (except cough and shortness of breath due to COPD). I advised the pt they are only allowed one visitors while visiting the hospital. Pt expressed understanding.

## 2020-03-19 ENCOUNTER — TELEPHONE (OUTPATIENT)
Dept: VASCULAR SURGERY | Facility: CLINIC | Age: 60
End: 2020-03-19

## 2020-03-19 DIAGNOSIS — I73.9 PAD (PERIPHERAL ARTERY DISEASE) (HCC): Primary | ICD-10-CM

## 2020-03-19 NOTE — TELEPHONE ENCOUNTER
"I spoke with patient regarding upcoming appt. He did complete testing which I did review.  His carotid testing is WNL, and SAMI are stable as well.  We will cancel upcoming appt due to precautions of corona virus.  He is not feeling well and tells me he has \"flu\" symptoms.  He is agreeable and will mail appt for 6 months and reschedule testing.  He voices understanding.   "

## 2020-07-03 ENCOUNTER — HOSPITAL ENCOUNTER (EMERGENCY)
Facility: HOSPITAL | Age: 60
Discharge: HOME | End: 2020-07-03
Attending: EMERGENCY MEDICINE | Admitting: EMERGENCY MEDICINE

## 2020-07-03 ENCOUNTER — APPOINTMENT (OUTPATIENT)
Dept: GENERAL RADIOLOGY | Facility: HOSPITAL | Age: 60
End: 2020-07-03

## 2020-07-03 VITALS
WEIGHT: 270 LBS | BODY MASS INDEX: 32.88 KG/M2 | TEMPERATURE: 98.1 F | HEIGHT: 76 IN | DIASTOLIC BLOOD PRESSURE: 86 MMHG | RESPIRATION RATE: 15 BRPM | OXYGEN SATURATION: 93 % | SYSTOLIC BLOOD PRESSURE: 131 MMHG | HEART RATE: 88 BPM

## 2020-07-03 DIAGNOSIS — S70.11XA CONTUSION OF RIGHT THIGH, INITIAL ENCOUNTER: Primary | ICD-10-CM

## 2020-07-03 PROCEDURE — 73552 X-RAY EXAM OF FEMUR 2/>: CPT

## 2020-07-03 PROCEDURE — 99283 EMERGENCY DEPT VISIT LOW MDM: CPT

## 2020-07-03 PROCEDURE — 25010000002 KETOROLAC TROMETHAMINE PER 15 MG: Performed by: EMERGENCY MEDICINE

## 2020-07-03 PROCEDURE — 96372 THER/PROPH/DIAG INJ SC/IM: CPT

## 2020-07-03 RX ORDER — INDOMETHACIN 50 MG/1
50 CAPSULE ORAL
Qty: 30 CAPSULE | Refills: 0 | Status: SHIPPED | OUTPATIENT
Start: 2020-07-03 | End: 2021-05-28

## 2020-07-03 RX ORDER — KETOROLAC TROMETHAMINE 30 MG/ML
30 INJECTION, SOLUTION INTRAMUSCULAR; INTRAVENOUS ONCE
Status: COMPLETED | OUTPATIENT
Start: 2020-07-03 | End: 2020-07-03

## 2020-07-03 RX ADMIN — KETOROLAC TROMETHAMINE 30 MG: 30 INJECTION, SOLUTION INTRAMUSCULAR at 20:13

## 2020-07-03 NOTE — ED PROVIDER NOTES
Subjective   Patient presents with a complaint of pain in his right leg.  He says he was stepping off a curb after walking out of the store and tripped and fell forward.  Complains of severe pain in his right upper leg.  He says reduced little bit he could not move anything.  He cannot stand weight on it right now.  He does admit to having had several beers today.      History provided by:  Patient   used: No    Fall   Mechanism of injury: fall    Injury location:  Leg  Leg injury location:  R upper leg  Incident location:  Street  Time since incident:  1 hour  Arrived directly from scene: yes    Fall:     Fall occurred:  Tripped    Impact surface:  [place]    Point of impact:  Unable to specify    Entrapped after fall: no    Protective equipment: none    Suspicion of alcohol use: yes    Suspicion of drug use: no    Tetanus status:  Unknown  Prior to arrival data:     Bystander interventions:  None    Patient ambulatory at scene: no      Blood loss:  None    Responsiveness at scene:  Alert    Orientation at scene:  Person, place, situation and time    Loss of consciousness: no      Amnesic to event: no      Airway interventions:  None    Breathing interventions:  None    IV access status:  None    IO access:  None    Fluids administered:  None    Cardiac interventions:  None    Medications administered:  None    Immobilization:  None    Airway condition since incident:  Stable    Breathing condition since incident:  Stable    Circulation condition since incident:  Stable    Mental status condition since incident:  Stable    Disability condition since incident:  Stable  Associated symptoms: no abdominal pain, no back pain, no chest pain, no difficulty breathing, no hearing loss, no nausea, no neck pain and no seizures    Risk factors: no AICD, no anticoagulation therapy, no asthma, no beta blocker therapy, no CHF, no diabetes, no pacemaker, no past MI and not pregnant        Review of Systems    Constitutional: Negative.    HENT: Negative.  Negative for hearing loss.    Respiratory: Negative.    Cardiovascular: Negative.  Negative for chest pain.   Gastrointestinal: Negative.  Negative for abdominal pain and nausea.   Genitourinary: Negative.    Musculoskeletal: Negative.  Negative for back pain and neck pain.   Skin: Negative.    Neurological: Negative.  Negative for seizures.   Psychiatric/Behavioral: Negative.    All other systems reviewed and are negative.      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 Juarez; ASA, Plavix, leg revascularization    • Tobacco abuse     1 PPD       Allergies   Allergen Reactions   • Codeine GI Intolerance       Past Surgical History:   Procedure Laterality Date   • ANGIOPLASTY     • AORTAGRAM Right 2/11/2019    Procedure: RIGHT LOWER EXTREMITY ANGIOGRAM;  Surgeon: Jitendra Juarez DO;  Location: [place] [address];  Service: Vascular   • APPENDECTOMY     • BACK SURGERY     • CAROTID STENT Bilateral    • HAND SURGERY Right    • LEG REVASCULARIZATION Bilateral        Family History   Problem Relation Age of Onset   • Heart disease Other    • Diabetes Other    • Cancer Other    • Stroke Other    • Kidney disease Other    • Heart attack Maternal Grandfather    • Arrhythmia Paternal Grandmother    • Cancer Paternal Grandmother         colon   • Hypertension Mother    • Diabetes Mother    • Kidney disease Father    • Cancer Paternal Grandfather         colon       Social History     Socioeconomic History   • Marital status:      Spouse name: Not on file   • Number of children: Not on file   • Years of education: Not on file   • Highest education level: Not on  file   Tobacco Use   • Smoking status: Current Every Day Smoker     Packs/day: 1.50     Types: Cigarettes   • Smokeless tobacco: Never Used   Substance and Sexual Activity   • Alcohol use: Yes     Comment: 6 pk every 2 days    • Drug use: No   • Sexual activity: Defer       Prior to Admission medications    Medication Sig Start Date End Date Taking? Authorizing Provider   albuterol sulfate  (90 Base) MCG/ACT inhaler Inhale 2 puffs Every 4 (Four) Hours As Needed for Wheezing. 1/8/20   Man Luis MD   apixaban (ELIQUIS) 5 MG tablet tablet Take 1 tablet by mouth Every 12 (Twelve) Hours. Indications: Atrial Fibrillation 1/8/20   Man Luis MD   aspirin 81 MG chewable tablet Chew 1 tablet Daily. 1/9/20   Man Luis MD   diazePAM (VALIUM) 2 MG tablet Take 1 tab daily x 2 days then 1 tablet every other day x 3 doses then stop 1/8/20   Man Luis MD   fluticasone (FLONASE) 50 MCG/ACT nasal spray 2 sprays by Each Nare route 2 (Two) Times a Day. 1/8/20   Man Luis MD   HYDROcodone-acetaminophen (NORCO)  MG per tablet Take 2 tablets by mouth Every 8 (Eight) Hours As Needed. 10/31/16   Jammie Lane MD   methocarbamol (ROBAXIN) 750 MG tablet Take 750 mg by mouth 3 (Three) Times a Day As Needed. 9/27/16   Jammie Lane MD   metoprolol tartrate (LOPRESSOR) 50 MG tablet Take 1 tablet by mouth Every 12 (Twelve) Hours. 1/8/20   Man Luis MD   Multiple Vitamins-Minerals (MULTIVITAMIN W/MINERALS) tablet Take 1 tablet by mouth Daily. 1/9/20   Man Luis MD   nicotine (NICODERM CQ) 21 MG/24HR patch Place 1 patch on the skin as directed by provider Daily. 1/9/20   Man Luis MD   polyethylene glycol (MIRALAX) pack packet Take 17 g by mouth Daily. 1/9/20   Man Luis MD   predniSONE (DELTASONE) 20 MG tablet Take 2 tabs daily x 2 days then 1 tab x 2 days then 1/2 tab x 2 days  1/8/20   Man Luis MD   simvastatin (ZOCOR) 20 MG tablet Take 20 mg by mouth Every Night. 9/27/16   Provider, MD Jammie       Medications   ketorolac (TORADOL) injection 30 mg (30 mg Intramuscular Given 7/3/20 2013)       Vitals:    07/03/20 2028   BP: 131/86   Pulse: 88   Resp: 15   Temp:    SpO2: 93%         Objective   Physical Exam   Constitutional: He is oriented to person, place, and time. He appears well-developed and well-nourished.   HENT:   Head: Normocephalic and atraumatic.   Neck: Normal range of motion. Neck supple.   Cardiovascular: Normal rate and regular rhythm.   Pulmonary/Chest: Effort normal and breath sounds normal.   Musculoskeletal: He exhibits tenderness.   Patient is tender to palpation in the right anterior femur area but there is no obvious deformity noted.  He does have good distal pulses and sensation.  He does move the leg with some pain.   Neurological: He is alert and oriented to person, place, and time.   Psychiatric: He has a normal mood and affect. His behavior is normal.   Nursing note and vitals reviewed.      Procedures         Lab Results (last 24 hours)     ** No results found for the last 24 hours. **          XR Femur 2 View Right   Final Result   1. No acute osseous pathology of the right femur   2. Diffuse vascular calcifications with right superficial femoral artery   stent.   This report was finalized on 07/03/2020 19:24 by Dr. Abbie Velez MD.          ED Course          MDM  Number of Diagnoses or Management Options  Contusion of right thigh, initial encounter: new and requires workup     Amount and/or Complexity of Data Reviewed  Tests in the radiology section of CPT®: ordered and reviewed    Risk of Complications, Morbidity, and/or Mortality  Presenting problems: moderate  Diagnostic procedures: moderate  Management options: moderate    Patient Progress  Patient progress: stable      Final diagnoses:   Contusion of right thigh, initial  encounter          Fred Baum Jr., MD  07/03/20 3784

## 2020-09-03 LAB
ANION GAP SERPL CALCULATED.3IONS-SCNC: 12 MMOL/L (ref 7–19)
BASOPHILS ABSOLUTE: 0.1 K/UL (ref 0–0.2)
BASOPHILS RELATIVE PERCENT: 0.8 % (ref 0–1)
BUN BLDV-MCNC: 5 MG/DL (ref 6–20)
CALCIUM SERPL-MCNC: 9.3 MG/DL (ref 8.6–10)
CHLORIDE BLD-SCNC: 105 MMOL/L (ref 98–111)
CHOLESTEROL, TOTAL: 137 MG/DL (ref 160–199)
CO2: 25 MMOL/L (ref 22–29)
CREAT SERPL-MCNC: 0.6 MG/DL (ref 0.5–1.2)
EOSINOPHILS ABSOLUTE: 0.3 K/UL (ref 0–0.6)
EOSINOPHILS RELATIVE PERCENT: 2.7 % (ref 0–5)
GFR AFRICAN AMERICAN: >59
GFR NON-AFRICAN AMERICAN: >60
GLUCOSE BLD-MCNC: 87 MG/DL (ref 74–109)
HCT VFR BLD CALC: 49 % (ref 42–52)
HDLC SERPL-MCNC: 68 MG/DL (ref 55–121)
HEMOGLOBIN: 16.5 G/DL (ref 14–18)
IMMATURE GRANULOCYTES #: 0 K/UL
LDL CHOLESTEROL CALCULATED: 58 MG/DL
LYMPHOCYTES ABSOLUTE: 4 K/UL (ref 1.1–4.5)
LYMPHOCYTES RELATIVE PERCENT: 36.8 % (ref 20–40)
MCH RBC QN AUTO: 32.2 PG (ref 27–31)
MCHC RBC AUTO-ENTMCNC: 33.7 G/DL (ref 33–37)
MCV RBC AUTO: 95.5 FL (ref 80–94)
MONOCYTES ABSOLUTE: 1 K/UL (ref 0–0.9)
MONOCYTES RELATIVE PERCENT: 9.4 % (ref 0–10)
NEUTROPHILS ABSOLUTE: 5.5 K/UL (ref 1.5–7.5)
NEUTROPHILS RELATIVE PERCENT: 49.9 % (ref 50–65)
PDW BLD-RTO: 13.3 % (ref 11.5–14.5)
PLATELET # BLD: 271 K/UL (ref 130–400)
PMV BLD AUTO: 9.2 FL (ref 9.4–12.4)
POTASSIUM SERPL-SCNC: 4.4 MMOL/L (ref 3.5–5)
RBC # BLD: 5.13 M/UL (ref 4.7–6.1)
SODIUM BLD-SCNC: 142 MMOL/L (ref 136–145)
TRIGL SERPL-MCNC: 57 MG/DL (ref 0–149)
WBC # BLD: 10.9 K/UL (ref 4.8–10.8)

## 2020-09-10 ENCOUNTER — TELEPHONE (OUTPATIENT)
Dept: VASCULAR SURGERY | Facility: CLINIC | Age: 60
End: 2020-09-10

## 2020-09-10 NOTE — TELEPHONE ENCOUNTER
Spoke with Mr Oden reminding him of his appointment for Friday, September 11th, 2020. [facility] 9 o'clock testing and follow up afterwards at 1030 am with Ijeoma FREITAS. Mr Oden confirmed he would be here.

## 2020-09-11 ENCOUNTER — OFFICE VISIT (OUTPATIENT)
Dept: VASCULAR SURGERY | Facility: CLINIC | Age: 60
End: 2020-09-11

## 2020-09-11 ENCOUNTER — HOSPITAL ENCOUNTER (OUTPATIENT)
Dept: ULTRASOUND IMAGING | Facility: HOSPITAL | Age: 60
Discharge: HOME | End: 2020-09-11
Admitting: NURSE PRACTITIONER

## 2020-09-11 VITALS
DIASTOLIC BLOOD PRESSURE: 86 MMHG | SYSTOLIC BLOOD PRESSURE: 136 MMHG | OXYGEN SATURATION: 98 % | HEIGHT: 76 IN | BODY MASS INDEX: 21.07 KG/M2 | HEART RATE: 103 BPM | WEIGHT: 173 LBS

## 2020-09-11 DIAGNOSIS — I65.23 BILATERAL CAROTID ARTERY STENOSIS: ICD-10-CM

## 2020-09-11 DIAGNOSIS — I73.9 PAD (PERIPHERAL ARTERY DISEASE) (HCC): Primary | ICD-10-CM

## 2020-09-11 DIAGNOSIS — I48.91 ATRIAL FIBRILLATION, UNSPECIFIED TYPE (HCC): ICD-10-CM

## 2020-09-11 DIAGNOSIS — Z72.0 TOBACCO ABUSE: ICD-10-CM

## 2020-09-11 DIAGNOSIS — I73.9 PAD (PERIPHERAL ARTERY DISEASE) (HCC): ICD-10-CM

## 2020-09-11 PROCEDURE — 93923 UPR/LXTR ART STDY 3+ LVLS: CPT | Performed by: SURGERY

## 2020-09-11 PROCEDURE — 93923 UPR/LXTR ART STDY 3+ LVLS: CPT

## 2020-09-11 PROCEDURE — 99214 OFFICE O/P EST MOD 30 MIN: CPT | Performed by: NURSE PRACTITIONER

## 2020-09-11 NOTE — PROGRESS NOTES
9/11/2020       Narendra Camara MD  [address] [place] [address] [address]  [place] [address]        Sascha Oden Sr.  1960    Chief Complaint   Patient presents with   • Follow-up     6 Month Follow Up For Peripheral Artery Disease and Bilateral Carotid Artery Stenosis. Test [id number] US pad ankle / brach ind ext comp and [id number] US pad carotid bilateral. Patient denies any stroke like symptoms.    • Smoker/Non-Smoker     Patient is Current Everyday Smoker    • Med Management     Verbally verified medications with patient. Mr Cecille verbalized all medications are correct and up to date.        Dear Narendra Camara MD:    HPI     I had the pleasure of seeing you patient in the office today for follow up.  As you recall, the patient is a 60 y.o. male who we are currently following for bilateral lower extremity PAD.  He has had multiple vascular interventions, most recently February 2019.  He is doing well denies any claudication to his lower extremities.  He does report being taken off of his chronic pain medication which is causing his discomfort to his left hip back.  He was also hospitalized in January with pneumonia, influenza A, and A. fib with RVR.  He was started on Eliquis at that time.  Hospitalist team did ask if it was okay to stop his Plavix and just have him on aspirin and Eliquis which I did okay.  He reports Dr. Camara did not resume his Eliquis nor has he seen cardiology.  He is maintained at this time on aspirin and Zocor.  He did have noninvasive testing performed today, which I did review in office.      Review of Systems   Constitutional: Negative.    HENT: Negative.    Eyes: Negative.    Respiratory: Negative.    Cardiovascular: Negative.    Gastrointestinal: Negative.    Endocrine: Negative.    Genitourinary: Negative.    Musculoskeletal: Positive for arthralgias and back pain.   Skin: Negative.    Allergic/Immunologic: Negative.    Neurological: Negative.    Hematological: Negative.   "  Psychiatric/Behavioral: Negative.    All other systems reviewed and are negative.       /86 (BP Location: Right arm, Patient Position: Sitting, Cuff Size: Adult)   Pulse 103   Ht 193 cm (76\")   Wt 78.5 kg (173 lb)   SpO2 98%   BMI 21.06 kg/m²   Physical Exam   Constitutional: He is oriented to person, place, and time. Vital signs are normal. He appears well-developed and well-nourished. He is cooperative.   HENT:   Head: Normocephalic and atraumatic.   Eyes: Pupils are equal, round, and reactive to light. No scleral icterus.   Neck: Normal range of motion. Neck supple. No thyromegaly present.   Cardiovascular: Normal rate, regular rhythm, normal heart sounds and intact distal pulses.   Right doppler signals   Pulmonary/Chest: Effort normal and breath sounds normal.   Abdominal: Soft. Bowel sounds are normal.   Musculoskeletal: Normal range of motion.        Lumbar back: He exhibits pain.   Neurological: He is alert and oriented to person, place, and time.   Skin: Skin is warm and dry.   Psychiatric: He has a normal mood and affect. His behavior is normal. Judgment and thought content normal.   Nursing note and vitals reviewed.    Diagnostic data:  Noninvasive testing including ABIs show right SAMI of 0.94 that decreased to 0.91 post exercise and left SAMI of 0.90 with increases to 0.95 post exercise    Patient Active Problem List   Diagnosis   • Pneumonia of both lower lobes due to infectious organism   • Sinus tachycardia   • Tobacco abuse   • Emphysema of lung (CMS/HCC)   • Cough with hemoptysis   • Coffee ground emesis   • Paroxysmal SVT (supraventricular tachycardia) (CMS/HCC)   • Chest pain with low risk for cardiac etiology   • Hypercholesterolemia   • Hypertension   • Chronic back pain   • PAD (peripheral artery disease) (CMS/HCC)   • Rhabdomyolysis   • Hyponatremia   • Alcoholism /alcohol abuse (CMS/HCC)   • Syncope and collapse   • Diarrhea   • Cough   • COPD with acute exacerbation (CMS/HCC)   • " CAP (community acquired pneumonia)   • Thrombocytopenia (CMS/[facility])   • Hypokalemia   • Influenza A   • Paroxysmal atrial fibrillation with rapid ventricular response (CMS/[facility])   • Acute respiratory failure with hypoxia (CMS/[facility])         ICD-10-CM ICD-9-CM   1. PAD (peripheral artery disease) (CMS/[facility]) I73.9 443.9   2. Atrial fibrillation, unspecified type (CMS/[facility]) I48.91 427.31   3. Bilateral carotid artery stenosis I65.23 433.10     433.30   4. Tobacco abuse Z72.0 305.1         PLAN: After thoroughly evaluating Sascha MUÑIZ Cecille Sr., I believe the best course of action is to remain conservative from vascular surgery standpoint.  Currently, he is doing well denies any claudication to his lower extremities.  I did review his testing which looks great and shows only mild arterial insufficiency.  We will see him back in 6 months with repeat noninvasive testing for continued surveillance, including ABIs and a carotid duplex.   I did  extensively on smoking cessation, and the patient was advised of the continued risks of smoking.  I provided over 10 minutes counseling on this matter.  He is not interested in smoking cessation at this time and currently smokes 1 and 1/2 packs of cigarettes per day.  I did discuss vascular risk factors as they pertain to the progression of vascular disease including controlling his hypertension, hyperlipidemia, and smoking cessation.  His blood pressure is stable on his current medication he is maintained on Zocor for his hyperlipidemia.  I will refer him to cardiology as he was previously diagnosed with atrial fibrillation and started on Eliquis.  His rhythm sounds normal in office today but I will let cardiology decide if he needs anything further.  Initially he was on aspirin and Plavix but Plavix was discontinued when he needed Eliquis but has not had Eliquis since a month after discharge. Body mass index is 21.06 kg/m². The patient is to continue taking their medications as  previously discussed.   This was all discussed in full with complete understanding.    Thank you for allowing me to participate in the care of your patient.  Please do not hesitate to call with any questions or concerns.  We will keep you aware of any further encounters with Sascha Oden Sr..      Sincerely Yours,        FORTINO Dubose

## 2020-10-13 ENCOUNTER — OFFICE VISIT (OUTPATIENT)
Dept: CARDIOLOGY | Facility: CLINIC | Age: 60
End: 2020-10-13

## 2020-10-13 VITALS
OXYGEN SATURATION: 99 % | HEIGHT: 76 IN | BODY MASS INDEX: 21.68 KG/M2 | HEART RATE: 87 BPM | DIASTOLIC BLOOD PRESSURE: 70 MMHG | WEIGHT: 178 LBS | SYSTOLIC BLOOD PRESSURE: 122 MMHG

## 2020-10-13 DIAGNOSIS — R06.09 DOE (DYSPNEA ON EXERTION): ICD-10-CM

## 2020-10-13 DIAGNOSIS — I10 ESSENTIAL HYPERTENSION: ICD-10-CM

## 2020-10-13 DIAGNOSIS — IMO0001 ALCOHOLISM /ALCOHOL ABUSE: Chronic | ICD-10-CM

## 2020-10-13 DIAGNOSIS — I48.0 PAROXYSMAL ATRIAL FIBRILLATION WITH RAPID VENTRICULAR RESPONSE (HCC): Primary | ICD-10-CM

## 2020-10-13 DIAGNOSIS — Z72.0 TOBACCO ABUSE: ICD-10-CM

## 2020-10-13 DIAGNOSIS — R07.2 PRECORDIAL CHEST PAIN: ICD-10-CM

## 2020-10-13 DIAGNOSIS — D69.6 THROMBOCYTOPENIA (HCC): ICD-10-CM

## 2020-10-13 DIAGNOSIS — I47.1 PAROXYSMAL SVT (SUPRAVENTRICULAR TACHYCARDIA) (HCC): ICD-10-CM

## 2020-10-13 DIAGNOSIS — R55 SYNCOPE AND COLLAPSE: ICD-10-CM

## 2020-10-13 PROCEDURE — 93000 ELECTROCARDIOGRAM COMPLETE: CPT | Performed by: INTERNAL MEDICINE

## 2020-10-13 PROCEDURE — 99204 OFFICE O/P NEW MOD 45 MIN: CPT | Performed by: INTERNAL MEDICINE

## 2020-10-13 RX ORDER — NITROGLYCERIN 0.4 MG/1
TABLET SUBLINGUAL
Qty: 25 TABLET | Refills: 11 | Status: SHIPPED | OUTPATIENT
Start: 2020-10-13

## 2020-10-13 NOTE — PROGRESS NOTES
Sascha Oden Sr.  [id number]  1960  60 y.o.  male    Referring Provider: Narendra Camara MD    Reason for  Visit:  Initial visit for  to establish care with myself for ongoing longitudinal care related to cardiovascular issues   pertaining to paroxysmal atrial fibrillation     Subjective     Chest pain with exertion, but at times can also occur at rest   moderate substernal,   pressure like   Lasts less than 5 minutes  Started 6 months go  Occurs once or twice a week  No associated diaphoresis    No associated nausea  No radiation    Relieved with rest or spontaneously  Not positional    No change with intake of food or antacids  No change with breathing  Mild to moderate associated dyspnea      No associated palpitations  No similar chest pain episodes in the past     Easy fatiguability   Chronic low back pain      Intermittent palpitations, once every several days to several weeks lasting for less than 1 minute  No associated symptoms of dizziness, weakness, chest pain,  shortness of breath      Smoker   Drinks moderate amounts of alcohol   Prior gastrointestinal bleed   Was on Eliquis that was stopped after Gastrointestinal bleed   On ASA 81 mg daily     peripheral arterial disease    Prior stents in both legs under follow up Dr Juarez      Last week had syncope, woke up on floor  No receeding palpitations, no incontinence of urine or stools, no preceeding chest pain. No aura.  No seizure like activity  No recurrence   No major injuries        History of present illness:  Sascha Oden Sr. is a 60 y.o. yo male with history of essential hypertension    who presents today for   Chief Complaint   Patient presents with   • Atrial Fibrillation     NEW PT - RECENT SYNCOPAL EPISODES   .    History  Past Medical History:   Diagnosis Date   • Alcohol abuse    • Arthritis    • Blackout spell    • Chronic back pain 1991    s/p fall    • Emphysema of lung (CMS/HCC)    • Hypercholesterolemia     Statin therapy    •  Hypertension    • PAP (pulmonary alveolar proteinosis) (CMS/[facility])    • PVD (peripheral vascular disease) (CMS/[facility])     Followed by Dr. Juarez; ASA, Plavix, leg revascularization    • Syncope    • Tobacco abuse     1 PPD   ,   Past Surgical History:   Procedure Laterality Date   • ANGIOPLASTY     • AORTAGRAM Right 2/11/2019    Procedure: RIGHT LOWER EXTREMITY ANGIOGRAM;  Surgeon: Jitendra Juarez DO;  Location: [facility] OR ;  Service: Vascular   • APPENDECTOMY     • BACK SURGERY     • CAROTID STENT Bilateral    • HAND SURGERY Right    • LEG REVASCULARIZATION Bilateral    • VEIN SURGERY     ,   Family History   Problem Relation Age of Onset   • Heart disease Other    • Diabetes Other    • Cancer Other    • Stroke Other    • Kidney disease Other    • Heart attack Maternal Grandfather    • Arrhythmia Paternal Grandmother    • Cancer Paternal Grandmother         colon   • Hypertension Mother    • Diabetes Mother    • Stroke Mother    • Kidney disease Father    • Cancer Paternal Grandfather         colon   ,   Social History     Tobacco Use   • Smoking status: Current Every Day Smoker     Packs/day: 1.00     Types: Cigarettes   • Smokeless tobacco: Never Used   Substance Use Topics   • Alcohol use: Yes     Comment: WEEKLY   • Drug use: No   ,     Medications  Current Outpatient Medications   Medication Sig Dispense Refill   • aspirin 81 MG chewable tablet Chew 1 tablet Daily. 30 tablet 0   • methocarbamol (ROBAXIN) 750 MG tablet Take 750 mg by mouth 3 (Three) Times a Day As Needed.  3   • metoprolol tartrate (LOPRESSOR) 50 MG tablet Take 1 tablet by mouth Every 12 (Twelve) Hours. 60 tablet 0   • simvastatin (ZOCOR) 20 MG tablet Take 20 mg by mouth Every Night.  3   • albuterol sulfate  (90 Base) MCG/ACT inhaler Inhale 2 puffs Every 4 (Four) Hours As Needed for Wheezing. 1 inhaler 0   • apixaban (ELIQUIS) 5 MG tablet tablet Take 1 tablet by mouth Every 12 (Twelve) Hours. Indications: Atrial Fibrillation 60  "tablet 0   • fluticasone (FLONASE) 50 MCG/ACT nasal spray 2 sprays by Each Nare route 2 (Two) Times a Day. 1 bottle 0   • indomethacin (INDOCIN) 50 MG capsule Take 1 capsule by mouth 3 (Three) Times a Day With Meals. 30 capsule 0   • nitroglycerin (NITROSTAT) 0.4 MG SL tablet 1 under the tongue as needed for angina, may repeat q5mins for up three doses 25 tablet 11     No current facility-administered medications for this visit.        Allergies:  Codeine    Review of Systems  Review of Systems   Constitution: Positive for malaise/fatigue.   HENT: Negative.    Eyes: Negative.    Cardiovascular: Positive for chest pain, dyspnea on exertion and palpitations. Negative for claudication, cyanosis, irregular heartbeat, leg swelling, near-syncope, orthopnea, paroxysmal nocturnal dyspnea and syncope.   Respiratory: Negative.    Endocrine: Negative.    Hematologic/Lymphatic: Negative.    Skin: Negative.    Musculoskeletal: Positive for arthritis, back pain and joint pain.   Gastrointestinal: Negative for anorexia.   Genitourinary: Negative.    Neurological: Negative.    Psychiatric/Behavioral: Negative.        Objective     Physical Exam:  /70   Pulse 87   Ht 193 cm (76\")   Wt 80.7 kg (178 lb)   SpO2 99%   BMI 21.67 kg/m²     Physical Exam  Constitutional:       Appearance: He is well-developed.   HENT:      Head: Normocephalic.   Neck:      Musculoskeletal: No edema.      Vascular: Normal carotid pulses. No carotid bruit or JVD.      Trachea: No tracheal tenderness or tracheal deviation.   Cardiovascular:      Rate and Rhythm: Regular rhythm.      Pulses: Normal pulses.      Heart sounds: Normal heart sounds.   Pulmonary:      Effort: Pulmonary effort is normal.      Breath sounds: No stridor.   Abdominal:      General: There is no distension.      Palpations: Abdomen is soft.      Tenderness: There is no abdominal tenderness.   Skin:     General: Skin is warm.   Neurological:      Mental Status: He is alert.     "  Cranial Nerves: No cranial nerve deficit.      Sensory: No sensory deficit.   Psychiatric:         Speech: Speech normal.         Behavior: Behavior normal.         Results Review:       ECG 12 Lead    Date/Time: 10/13/2020 2:04 PM  Performed by: Rajendra Johnson MD  Authorized by: Rajendra Johnson MD   Comparison: compared with previous ECG from 1/1/2020  Comparison to previous ECG:  sinus rhythm replaced atrial fibrillation    Rhythm: sinus rhythm  Rate: normal  Conduction: conduction normal  ST Segments: ST segments normal  T Waves: T waves normal  QRS axis: normal  Other: no other findings    Clinical impression: normal ECG            Assessment/Plan   Diagnoses and all orders for this visit:    1. Paroxysmal atrial fibrillation with rapid ventricular response (CMS/HCC) (Primary)  -     Holter Monitor - 72 Hour Up To 21 Days; Future    2. Paroxysmal SVT (supraventricular tachycardia) (CMS/HCC)    3. Thrombocytopenia (CMS/HCC)    4. Tobacco abuse    5. Alcoholism /alcohol abuse (CMS/HCC)    6. Precordial chest pain  -     Stress Test With Myocardial Perfusion One Day; Future    7. BUCKNER (dyspnea on exertion)  -     Adult Transthoracic Echo Complete W/ Cont if Necessary Per Protocol; Future    8. Syncope and collapse    9. Essential hypertension    Other orders  -     ECG 12 Lead  -     nitroglycerin (NITROSTAT) 0.4 MG SL tablet; 1 under the tongue as needed for angina, may repeat q5mins for up three doses  Dispense: 25 tablet; Refill: 11           ____________________________________________________________________________________________________________________________________________  Health maintenance and recommendations    Low salt/ HTN/ Heart healthy carbohydrate restricted cardiac diet   The patient is advised to reduce or avoid caffeine or other cardiac stimulants.   Minimize or avoid  NSAID-type medications      Monitor for any signs of bleeding including red or dark stools. Fall precautions.   Advised staying  uptodate with immunizations per established standard guidelines.    Offered to give patient  a copy of my notes     Questions were encouraged, asked and answered to the patient's  understanding and satisfaction. Questions if any regarding current medications and side effects, need for refills and importance of compliance to medications stressed.    Reviewed available prior notes, consults, prior visits, laboratory findings, radiology and cardiology relevant reports. Updated chart as applicable. I have reviewed the patient's medical history in detail and updated the computerized patient record as relevant.      Updated patient regarding any new or relevant abnormalities on review of records or any new findings on physical exam. Mentioned to patient about purpose of visit and desirable health short and long term goals and objectives.    Primary to monitor CBC CMP Lipid panel and TSH as applicable    ___________________________________________________________________________________________________________________________________________    Plan    Orders Placed This Encounter   Procedures   • Stress Test With Myocardial Perfusion One Day     Standing Status:   Future     Standing Expiration Date:   10/13/2021     Order Specific Question:   What stress agent will be used?     Answer:   Regadenoson (Lexiscan)     Order Specific Question:   Difficulty walking criteria?     Answer:   Musculoskeletal (hips, knees, feet, back, amputee)     Order Specific Question:   Reason for exam?     Answer:   Chest Pain   • Holter Monitor - 72 Hour Up To 21 Days     Standing Status:   Future     Standing Expiration Date:   10/13/2021     Order Specific Question:   Reason for exam?     Answer:   AFib   • ECG 12 Lead     This order was created via procedure documentation   • Adult Transthoracic Echo Complete W/ Cont if Necessary Per Protocol     Myocardial strain to be performed during echocardiogram as long as technically feasible      Standing Status:   Future     Standing Expiration Date:   10/13/2021     Order Specific Question:   Reason for exam?     Answer:   Arrhythmia      Requested Prescriptions     Signed Prescriptions Disp Refills   • nitroglycerin (NITROSTAT) 0.4 MG SL tablet 25 tablet 11     Si under the tongue as needed for angina, may repeat q5mins for up three doses      My need loop recorder in future         Return in about 6 weeks (around 2020).

## 2020-10-17 ENCOUNTER — APPOINTMENT (OUTPATIENT)
Dept: CT IMAGING | Age: 60
End: 2020-10-17
Payer: MEDICAID

## 2020-10-17 ENCOUNTER — APPOINTMENT (OUTPATIENT)
Dept: GENERAL RADIOLOGY | Age: 60
End: 2020-10-17
Payer: MEDICAID

## 2020-10-17 ENCOUNTER — HOSPITAL ENCOUNTER (EMERGENCY)
Age: 60
Discharge: HOME | End: 2020-10-17
Attending: EMERGENCY MEDICINE
Payer: MEDICAID

## 2020-10-17 VITALS
TEMPERATURE: 97.8 F | DIASTOLIC BLOOD PRESSURE: 84 MMHG | SYSTOLIC BLOOD PRESSURE: 115 MMHG | HEART RATE: 86 BPM | BODY MASS INDEX: 32.27 KG/M2 | WEIGHT: 265 LBS | OXYGEN SATURATION: 93 % | RESPIRATION RATE: 18 BRPM | HEIGHT: 76 IN

## 2020-10-17 DIAGNOSIS — M25.511 ACUTE PAIN OF RIGHT SHOULDER: ICD-10-CM

## 2020-10-17 DIAGNOSIS — R55 SYNCOPE, UNSPECIFIED SYNCOPE TYPE: Primary | ICD-10-CM

## 2020-10-17 DIAGNOSIS — S09.90XA CLOSED HEAD INJURY, INITIAL ENCOUNTER: ICD-10-CM

## 2020-10-17 DIAGNOSIS — R07.89 CHEST WALL PAIN: ICD-10-CM

## 2020-10-17 DIAGNOSIS — I48.0 PAROXYSMAL ATRIAL FIBRILLATION (HCC): ICD-10-CM

## 2020-10-17 DIAGNOSIS — I73.9 PERIPHERAL VASCULAR DISEASE (HCC): ICD-10-CM

## 2020-10-17 DIAGNOSIS — S42.021K CLOSED DISPLACED FRACTURE OF SHAFT OF RIGHT CLAVICLE WITH NONUNION, SUBSEQUENT ENCOUNTER: ICD-10-CM

## 2020-10-17 DIAGNOSIS — F10.10 CHRONIC ALCOHOL ABUSE: ICD-10-CM

## 2020-10-17 LAB
ALBUMIN SERPL-MCNC: 4.6 G/DL (ref 3.5–5.2)
ALP BLD-CCNC: 100 U/L (ref 40–130)
ALT SERPL-CCNC: 31 U/L (ref 5–41)
ANION GAP SERPL CALCULATED.3IONS-SCNC: 11 MMOL/L (ref 7–19)
APTT: 27.7 SEC (ref 26–36.2)
AST SERPL-CCNC: 31 U/L (ref 5–40)
ATYPICAL LYMPHOCYTE RELATIVE PERCENT: 2 % (ref 0–8)
BASOPHILS ABSOLUTE: 0 K/UL (ref 0–0.2)
BASOPHILS RELATIVE PERCENT: 0 % (ref 0–1)
BILIRUB SERPL-MCNC: <0.2 MG/DL (ref 0.2–1.2)
BUN BLDV-MCNC: 7 MG/DL (ref 6–20)
CALCIUM SERPL-MCNC: 9.5 MG/DL (ref 8.6–10)
CHLORIDE BLD-SCNC: 101 MMOL/L (ref 98–111)
CO2: 26 MMOL/L (ref 22–29)
CREAT SERPL-MCNC: 0.7 MG/DL (ref 0.5–1.2)
EOSINOPHILS ABSOLUTE: 0.14 K/UL (ref 0–0.6)
EOSINOPHILS RELATIVE PERCENT: 1 % (ref 0–5)
GFR AFRICAN AMERICAN: >59
GFR NON-AFRICAN AMERICAN: >60
GLUCOSE BLD-MCNC: 107 MG/DL (ref 74–109)
HCT VFR BLD CALC: 48.1 % (ref 42–52)
HEMOGLOBIN: 16.4 G/DL (ref 14–18)
IMMATURE GRANULOCYTES #: 0.1 K/UL
INR BLD: 0.9 (ref 0.88–1.18)
LYMPHOCYTES ABSOLUTE: 4.4 K/UL (ref 1.1–4.5)
LYMPHOCYTES RELATIVE PERCENT: 30 % (ref 20–40)
MCH RBC QN AUTO: 32.3 PG (ref 27–31)
MCHC RBC AUTO-ENTMCNC: 34.1 G/DL (ref 33–37)
MCV RBC AUTO: 94.7 FL (ref 80–94)
METAMYELOCYTES RELATIVE PERCENT: 1 %
MONOCYTES ABSOLUTE: 0.4 K/UL (ref 0–0.9)
MONOCYTES RELATIVE PERCENT: 3 % (ref 0–10)
NEUTROPHILS ABSOLUTE: 8.8 K/UL (ref 1.5–7.5)
NEUTROPHILS RELATIVE PERCENT: 63 % (ref 50–65)
PDW BLD-RTO: 14.3 % (ref 11.5–14.5)
PLATELET # BLD: 210 K/UL (ref 130–400)
PLATELET SLIDE REVIEW: ADEQUATE
PMV BLD AUTO: 9.3 FL (ref 9.4–12.4)
POTASSIUM REFLEX MAGNESIUM: 4.2 MMOL/L (ref 3.5–5)
PRO-BNP: 126 PG/ML (ref 0–900)
PROTHROMBIN TIME: 12 SEC (ref 12–14.6)
RBC # BLD: 5.08 M/UL (ref 4.7–6.1)
SARS-COV-2, NAAT: NOT DETECTED
SODIUM BLD-SCNC: 138 MMOL/L (ref 136–145)
TOTAL PROTEIN: 8.1 G/DL (ref 6.6–8.7)
TROPONIN: <0.01 NG/ML (ref 0–0.03)
TROPONIN: <0.01 NG/ML (ref 0–0.03)
WBC # BLD: 13.7 K/UL (ref 4.8–10.8)

## 2020-10-17 PROCEDURE — U0002 COVID-19 LAB TEST NON-CDC: HCPCS

## 2020-10-17 PROCEDURE — 36415 COLL VENOUS BLD VENIPUNCTURE: CPT

## 2020-10-17 PROCEDURE — 71045 X-RAY EXAM CHEST 1 VIEW: CPT

## 2020-10-17 PROCEDURE — 83880 ASSAY OF NATRIURETIC PEPTIDE: CPT

## 2020-10-17 PROCEDURE — 85025 COMPLETE CBC W/AUTO DIFF WBC: CPT

## 2020-10-17 PROCEDURE — 99282 EMERGENCY DEPT VISIT SF MDM: CPT

## 2020-10-17 PROCEDURE — 84484 ASSAY OF TROPONIN QUANT: CPT

## 2020-10-17 PROCEDURE — 99999 PR OFFICE/OUTPT VISIT,PROCEDURE ONLY: CPT | Performed by: EMERGENCY MEDICINE

## 2020-10-17 PROCEDURE — 85730 THROMBOPLASTIN TIME PARTIAL: CPT

## 2020-10-17 PROCEDURE — 73030 X-RAY EXAM OF SHOULDER: CPT

## 2020-10-17 PROCEDURE — 93005 ELECTROCARDIOGRAM TRACING: CPT | Performed by: EMERGENCY MEDICINE

## 2020-10-17 PROCEDURE — 80053 COMPREHEN METABOLIC PANEL: CPT

## 2020-10-17 PROCEDURE — 85610 PROTHROMBIN TIME: CPT

## 2020-10-17 PROCEDURE — 72125 CT NECK SPINE W/O DYE: CPT

## 2020-10-17 PROCEDURE — 70450 CT HEAD/BRAIN W/O DYE: CPT

## 2020-10-17 RX ORDER — ALBUTEROL SULFATE 90 UG/1
2 AEROSOL, METERED RESPIRATORY (INHALATION) EVERY 4 HOURS PRN
COMMUNITY
Start: 2020-01-08

## 2020-10-17 RX ORDER — INDOMETHACIN 50 MG/1
50 CAPSULE ORAL
COMMUNITY
Start: 2020-07-03

## 2020-10-17 RX ORDER — NITROGLYCERIN 0.4 MG/1
TABLET SUBLINGUAL
COMMUNITY
Start: 2020-10-13

## 2020-10-17 RX ORDER — KETOROLAC TROMETHAMINE 30 MG/ML
15 INJECTION, SOLUTION INTRAMUSCULAR; INTRAVENOUS ONCE
Status: DISCONTINUED | OUTPATIENT
Start: 2020-10-17 | End: 2020-10-17 | Stop reason: HOSPADM

## 2020-10-17 RX ORDER — METOPROLOL TARTRATE 50 MG/1
50 TABLET, FILM COATED ORAL EVERY 12 HOURS SCHEDULED
COMMUNITY
Start: 2020-01-08

## 2020-10-17 RX ORDER — ASPIRIN 81 MG/1
81 TABLET, CHEWABLE ORAL DAILY
COMMUNITY
Start: 2020-01-09

## 2020-10-17 ASSESSMENT — ENCOUNTER SYMPTOMS
COUGH: 1
EYE REDNESS: 0
SHORTNESS OF BREATH: 0
VOICE CHANGE: 0
DIARRHEA: 0
EYE PAIN: 0
VOMITING: 0
RHINORRHEA: 0
ABDOMINAL PAIN: 0

## 2020-10-17 ASSESSMENT — PAIN DESCRIPTION - ORIENTATION: ORIENTATION: RIGHT

## 2020-10-17 ASSESSMENT — PAIN DESCRIPTION - LOCATION: LOCATION: HIP;SHOULDER

## 2020-10-17 ASSESSMENT — PAIN SCALES - WONG BAKER: WONGBAKER_NUMERICALRESPONSE: 6

## 2020-10-17 NOTE — ED PROVIDER NOTES
[address] [facility] [place] EMERGENCY DEPT  EMERGENCY DEPARTMENT ENCOUNTER      Pt Name: Beatriz Acosta  MRN: [id number]  [place] 1960  Date of evaluation: 10/17/2020  Provider: Jairo Qureshi MD    CHIEF COMPLAINT       Chief Complaint   Patient presents with    Chest Pain         HISTORY OF PRESENT ILLNESS   (Location/Symptom, Timing/Onset,Context/Setting, Quality, Duration, Modifying Factors, Severity)  Note limiting factors. Beatriz Acosta is a 64 y.o. male who presents to the emergency department for evaluation after a syncopal episode tonight. Patient states he was standing around drinking beer with his friends when he suddenly passed out and collapsed, falling to the ground and landing on his right shoulder. Patient having pain around the right shoulder and upper right chest area since the fall. Patient has a chronic cough and reports a history of COPD. Also has a history of atrial fibrillation. Patient has a wearable cardiac monitor in place that was put on by cardiology during visit there earlier this week for syncopal episodes. Patient denies any chest pain or shortness of breath. EMS witnessed a syncopal episode shortly after their arrival and couldn't palpate a radial pulse. When they started to perform chest compressions, pt woke up and yelled at them. His only complaint at this time is pain to the right shoulder. HPI    NursingNotes were reviewed. REVIEW OF SYSTEMS    (2-9 systems for level 4, 10 or more for level 5)     Review of Systems   Constitutional: Negative for fatigue and fever. HENT: Negative for congestion, rhinorrhea and voice change. Eyes: Negative for pain and redness. Respiratory: Positive for cough. Negative for shortness of breath. Cardiovascular: Negative for chest pain. Gastrointestinal: Negative for abdominal pain, diarrhea and vomiting. Endocrine: Negative. Genitourinary: Negative. Musculoskeletal: Negative for arthralgias and gait problem.    Skin: Negative for rash and wound. Neurological: Positive for syncope. Negative for weakness and headaches. Hematological: Negative. Psychiatric/Behavioral: Negative. All other systems reviewed and are negative. A complete review of systems was performed and is negative except as noted above in the HPI. PAST MEDICAL HISTORY     Past Medical History:   Diagnosis Date    Arthritis     [facility]rial [facility]     Back pain     COPD [facility]chronic obstructive pulmonary disease) [facility]     ETOH abuse     Hyperlipidemia     Hypertension     [facility]          SURGICAL HISTORY       Past Surgical History:   Procedure Laterality Date    APPENDECTOMY      BACK SURGERY      CARDIAC SURGERY      angioplasty    CAROTID STENT PLACEMENT      HAND SURGERY Right     VEIN SURGERY      revascularization of leg         CURRENT MEDICATIONS       Discharge Medication List as of 10/17/2020  3:41 AM      CONTINUE these medications which have NOT CHANGED    Details   albuterol sulfate  (90 Base) MCG/ACT inhaler Inhale 2 puffs into the lungs every 4 hours as neededHistorical Med      aspirin 81 MG chewable tablet Take 81 mg by mouth dailyHistorical Med      apixaban (ELIQUIS) 5 MG TABS tablet Take 5 mg by mouth every 12 hoursHistorical Med      indomethacin (INDOCIN) 50 MG capsule Take 50 mg by mouth 3 times daily (with meals)Historical Med      metoprolol tartrate (LOPRESSOR) 50 MG tablet Take 50 mg by mouth every 12 hoursHistorical Med      nitroGLYCERIN (NITROSTAT) 0.4 MG SL tablet 1 under the tongue as needed for angina, may repeat q5mins for up three dosesHistorical Med             ALLERGIES     Codeine    FAMILY HISTORY     History reviewed. No pertinent family history.        SOCIAL HISTORY       Social History     Socioeconomic History    Marital status:      Spouse name: None    Number of children: None    Years of education: None    Highest education level: None Occupational History    None   Social Needs    Financial resource strain: None    Food insecurity     Worry: None     Inability: None    Transportation needs     Medical: None     Non-medical: None   Tobacco Use    Smoking status: Current Every Day Smoker     Packs/day: 1.00     Types: Cigarettes    Smokeless tobacco: Never Used   Substance and Sexual Activity    Alcohol use: Yes    Drug use: Not Currently    Sexual activity: None   Lifestyle    Physical activity     Days per week: None     Minutes per session: None    Stress: None   Relationships    Social connections     Talks on phone: None     Gets together: None     Attends Worship service: None     Active member of club or organization: None     Attends meetings of clubs or organizations: None     Relationship status: None    Intimate partner violence     Fear of current or ex partner: None     Emotionally abused: None     Physically abused: None     Forced sexual activity: None   Other Topics Concern    None   Social History Narrative    None       SCREENINGS             PHYSICAL EXAM    (up to 7 for level 4, 8 or more for level 5)     ED Triage Vitals [10/17/20 0014]   BP Temp Temp Source Pulse Resp SpO2 Height Weight   (!) 112/98 97.8 °F (36.6 °C) Oral 93 24 93 % 6' 4\" (1.93 m) 265 lb (120.2 kg)       Physical Exam  Vitals signs and nursing note reviewed. Constitutional:       General: He is not in acute distress. Appearance: He is well-developed. He is not diaphoretic. HENT:      Head: Normocephalic and atraumatic. Eyes:      General: No scleral icterus. Neck:      Vascular: No JVD. Cardiovascular:      Rate and Rhythm: Normal rate and regular rhythm. Pulses:           Radial pulses are 2+ on the right side and 2+ on the left side. Dorsalis pedis pulses are 2+ on the right side and 2+ on the left side. Heart sounds: Normal heart sounds. No murmur. No friction rub. No gallop.     Pulmonary:      Effort: Pulmonary effort is normal. No accessory muscle usage or respiratory distress. Breath sounds: Normal breath sounds. No stridor. No decreased breath sounds, wheezing, rhonchi or rales. Chest:      Chest wall: No tenderness. Comments: Wearable cardiac monitor left chest  Abdominal:      General: There is no distension. Palpations: Abdomen is soft. Tenderness: There is no abdominal tenderness. There is no guarding or rebound. Musculoskeletal: Normal range of motion. General: No deformity. Right shoulder: He exhibits tenderness. He exhibits no swelling and no deformity. Right lower leg: No edema. Left lower leg: No edema. Skin:     General: Skin is warm and dry. Coloration: Skin is not pale. Findings: No erythema. Neurological:      Mental Status: He is alert and oriented to person, place, and time. GCS: GCS eye subscore is 4. GCS verbal subscore is 5. GCS motor subscore is 6. Cranial Nerves: No cranial nerve deficit. Motor: No abnormal muscle tone. Coordination: Coordination normal.   Psychiatric:         Behavior: Behavior normal.         Judgment: Judgment normal.         DIAGNOSTIC RESULTS     EKG: All EKG's are interpreted by the Emergency Department Physician who either signs or Co-signs this chart in the absence of a cardiologist.    Normal sinus rhythm with no findings of acute ischemia or infarction. Normal QT interval with no signs of Solange-Parkinson-White, Brugada, or other syndromic EKG changes that would predispose to cardiac etiology of syncope.       RADIOLOGY:   Non-plain film images such as CT, Ultrasound and MRI are read by the radiologist. Lola Keenan images are visualized and preliminarily interpreted by the emergency physician with the below findings:      Interpretation per the Radiologist below, if available at the time of this note:    XR CHEST PORTABLE    (Results Pending)   XR SHOULDER RIGHT (MIN 2 VIEWS) (Results Pending)   CT Head WO Contrast    (Results Pending)   CT CERVICAL SPINE WO CONTRAST    (Results Pending)         ED BEDSIDE ULTRASOUND:   Performed by ED Physician - none    LABS:  Labs Reviewed   CBC WITH AUTO DIFFERENTIAL - Abnormal; Notable for the following components:       Result Value    WBC 13.7 (*)     MCV 94.7 (*)     MCH 32.3 (*)     MPV 9.3 (*)     Neutrophils Absolute 8.8 (*)     Metamyelocytes Relative 1 (*)     All other components within normal limits   COMPREHENSIVE METABOLIC PANEL W/ REFLEX TO MG FOR LOW K   TROPONIN   BRAIN NATRIURETIC PEPTIDE   PROTIME-INR   APTT   COVID-19   TROPONIN   URINE RT REFLEX TO CULTURE       All other labs were within normal range or not returned as of this dictation. Medications   ketorolac (TORADOL) injection 15 mg (has no administration in time range)       EMERGENCY DEPARTMENT COURSE and DIFFERENTIALDIAGNOSIS/MDM:   Vitals:    Vitals:    10/17/20 0133 10/17/20 0202 10/17/20 0232 10/17/20 0302   BP: 111/74 110/69 110/78 115/84   Pulse: 87 83 81 86   Resp: 22 21 23 18   Temp:       TempSrc:       SpO2: 92% 94% 96% 93%   Weight:       Height:           MDM     Chest x-ray and right shoulder x-ray performed and show a right clavicle fracture. Results from [facility] reviewed and this clavicle fracture with nonunion was noted on previous images there in January. Unclear whether there is an acute component to the chronic deformity on imaging here as we have no images for comparison, only radiology reads. Evaluation and work-up here revealed no signs of emergent or life-threatening pathology that would necessitate admission for further work-up or management at this time. Patient is felt to be stable for discharge home with return precautions for worsening of the condition or development of new concerning symptoms. Patient was encouraged to follow-up with their primary care doctor in the appropriate timeframe.   Necessary prescriptions and information have been provided for treatment at home. Patient voices understanding and agreement with the plan. CONSULTS:  None    PROCEDURES:  Unless otherwise notedbelow, none     Procedures      FINAL IMPRESSION     1. Syncope, unspecified syncope type    2. Chest wall pain    3. Acute pain of right shoulder    4. 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 Chronic alcohol abuse    6. Paroxysmal atrial fibrillation (HCC)    7. Closed head injury, initial encounter    8.  Peripheral vascular disease [facility])          DISPOSITION/PLAN   DISPOSITION Decision To Discharge 10/17/2020 03:40:07 AM      PATIENT REFERRED TO:  [facility] EMERGENCY DEPT  [facility]  [phone]    If symptoms worsen    Shana Walls MD  [address] [place] [address]  [phone]      As needed    Emily Jc MD  [place] [address] [phone]      As needed      DISCHARGE MEDICATIONS:  Discharge Medication List as of 10/17/2020  3:41 AM             (Please note that portions of this note were completed with a voice recognition program.  Efforts were made to edit the dictations butoccasionally words are mis-transcribed.)    Edelmira Sharif MD (electronically signed)  AttendingEmergency Physician          Edelmira Sharif., MD  10/17/20 0847

## 2020-10-20 LAB
EKG P AXIS: 56 DEGREES
EKG P-R INTERVAL: 146 MS
EKG Q-T INTERVAL: 366 MS
EKG QRS DURATION: 92 MS
EKG QTC CALCULATION (BAZETT): 422 MS
EKG T AXIS: 37 DEGREES

## 2020-10-20 PROCEDURE — 93010 ELECTROCARDIOGRAM REPORT: CPT | Performed by: INTERNAL MEDICINE

## 2020-11-11 ENCOUNTER — HOSPITAL ENCOUNTER (OUTPATIENT)
Dept: CARDIOLOGY | Facility: HOSPITAL | Age: 60
End: 2020-11-11

## 2020-11-11 ENCOUNTER — APPOINTMENT (OUTPATIENT)
Dept: CARDIOLOGY | Facility: HOSPITAL | Age: 60
End: 2020-11-11

## 2020-11-23 PROBLEM — Z79.01 CURRENT USE OF LONG TERM ANTICOAGULATION: Status: ACTIVE | Noted: 2020-11-23

## 2020-11-23 NOTE — PATIENT INSTRUCTIONS
Steps to Quit Smoking  Smoking tobacco is the leading cause of preventable death. It can affect almost every organ in the body. Smoking puts you and people around you at risk for many serious, long-lasting (chronic) diseases. Quitting smoking can be hard, but it is one of the best things that you can do for your health. It is never too late to quit.  How do I get ready to quit?  When you decide to quit smoking, make a plan to help you succeed. Before you quit:  · Pick a date to quit. Set a date within the next 2 weeks to give you time to prepare.  · Write down the reasons why you are quitting. Keep this list in places where you will see it often.  · Tell your family, friends, and co-workers that you are quitting. Their support is important.  · Talk with your doctor about the choices that may help you quit.  · Find out if your health insurance will pay for these treatments.  · Know the people, places, things, and activities that make you want to smoke (triggers). Avoid them.  What first steps can I take to quit smoking?  · Throw away all cigarettes at home, at work, and in your car.  · Throw away the things that you use when you smoke, such as ashtrays and lighters.  · Clean your car. Make sure to empty the ashtray.  · Clean your home, including curtains and carpets.  What can I do to help me quit smoking?  Talk with your doctor about taking medicines and seeing a counselor at the same time. You are more likely to succeed when you do both.  · If you are pregnant or breastfeeding, talk with your doctor about counseling or other ways to quit smoking. Do not take medicine to help you quit smoking unless your doctor tells you to do so.  To quit smoking:  Quit right away  · Quit smoking totally, instead of slowly cutting back on how much you smoke over a period of time.  · Go to counseling. You are more likely to quit if you go to counseling sessions regularly.  Take medicine  You may take medicines to help you quit. Some  medicines need a prescription, and some you can buy over-the-counter. Some medicines may contain a drug called nicotine to replace the nicotine in cigarettes. Medicines may:  · Help you to stop having the desire to smoke (cravings).  · Help to stop the problems that come when you stop smoking (withdrawal symptoms).  Your doctor may ask you to use:  · Nicotine patches, gum, or lozenges.  · Nicotine inhalers or sprays.  · Non-nicotine medicine that is taken by mouth.  Find resources  Find resources and other ways to help you quit smoking and remain smoke-free after you quit. These resources are most helpful when you use them often. They include:  · Online chats with a counselor.  · Phone quitlines.  · Printed self-help materials.  · Support groups or group counseling.  · Text messaging programs.  · Mobile phone apps. Use apps on your mobile phone or tablet that can help you stick to your quit plan. There are many free apps for mobile phones and tablets as well as websites. Examples include Quit Guide from the CDC and smokefree.gov    What things can I do to make it easier to quit?    · Talk to your family and friends. Ask them to support and encourage you.  · Call a phone quitline (9-448-QUITNOW), reach out to support groups, or work with a counselor.  · Ask people who smoke to not smoke around you.  · Avoid places that make you want to smoke, such as:  ? Bars.  ? Parties.  ? Smoke-break areas at work.  · Spend time with people who do not smoke.  · Lower the stress in your life. Stress can make you want to smoke. Try these things to help your stress:  ? Getting regular exercise.  ? Doing deep-breathing exercises.  ? Doing yoga.  ? Meditating.  ? Doing a body scan. To do this, close your eyes, focus on one area of your body at a time from head to toe. Notice which parts of your body are tense. Try to relax the muscles in those areas.  How will I feel when I quit smoking?  Day 1 to 3 weeks  Within the first 24 hours,  you may start to have some problems that come from quitting tobacco. These problems are very bad 2-3 days after you quit, but they do not often last for more than 2-3 weeks. You may get these symptoms:  · Mood swings.  · Feeling restless, nervous, angry, or annoyed.  · Trouble concentrating.  · Dizziness.  · Strong desire for high-sugar foods and nicotine.  · Weight gain.  · Trouble pooping (constipation).  · Feeling like you may vomit (nausea).  · Coughing or a sore throat.  · Changes in how the medicines that you take for other issues work in your body.  · Depression.  · Trouble sleeping (insomnia).  Week 3 and afterward  After the first 2-3 weeks of quitting, you may start to notice more positive results, such as:  · Better sense of smell and taste.  · Less coughing and sore throat.  · Slower heart rate.  · Lower blood pressure.  · Clearer skin.  · Better breathing.  · Fewer sick days.  Quitting smoking can be hard. Do not give up if you fail the first time. Some people need to try a few times before they succeed. Do your best to stick to your quit plan, and talk with your doctor if you have any questions or concerns.  Summary  · Smoking tobacco is the leading cause of preventable death. Quitting smoking can be hard, but it is one of the best things that you can do for your health.  · When you decide to quit smoking, make a plan to help you succeed.  · Quit smoking right away, not slowly over a period of time.  · When you start quitting, seek help from your doctor, family, or friends.  This information is not intended to replace advice given to you by your health care provider. Make sure you discuss any questions you have with your health care provider.  Document Released: 10/14/2010 Document Revised: 09/11/2020 Document Reviewed: 03/07/2020  Elsevier Patient Education © 2020 [employer]  Health Risks of Smoking  Smoking cigarettes is very bad for your health. Tobacco smoke has over 200 known poisons in it. It  contains the poisonous gases nitrogen oxide and carbon monoxide. There are over 60 chemicals in tobacco smoke that cause cancer.  Smoking is difficult to quit because a chemical in tobacco, called nicotine, causes addiction or dependence. When you smoke and inhale, nicotine is absorbed rapidly into the bloodstream through your lungs. Both inhaled and non-inhaled nicotine may be addictive.  What are the risks of cigarette smoke?  Cigarette smokers have an increased risk of many serious medical problems, including:  · Lung cancer.  · Lung disease, such as pneumonia, bronchitis, and emphysema.  · Chest pain (angina) and heart attack because the heart is not getting enough oxygen.  · Heart disease and peripheral blood vessel disease.  · High blood pressure (hypertension).  · Stroke.  · Oral cancer, including cancer of the lip, mouth, or voice box.  · Bladder cancer.  · Pancreatic cancer.  · Cervical cancer.  · Pregnancy complications, including premature birth.  · Stillbirths and smaller  babies, birth defects, and genetic damage to sperm.  · Early menopause.  · Lower estrogen level for women.  · Infertility.  · Facial wrinkles.  · Blindness.  · Increased risk of broken bones (fractures).  · Senile dementia.  · Stomach ulcers and internal bleeding.  · Delayed wound healing and increased risk of complications during surgery.  · Even smoking lightly shortens your life expectancy by several years.  Because of secondhand smoke exposure, children of smokers have an increased risk of the following:  · Sudden infant death syndrome (SIDS).  · Respiratory infections.  · Lung cancer.  · Heart disease.  · Ear infections.  What are the benefits of quitting?  There are many health benefits of quitting smoking. Here are some of them:  · Within days of quitting smoking, your risk of having a heart attack decreases, your blood flow improves, and your lung capacity improves. Blood pressure, pulse rate, and breathing patterns  start returning to normal soon after quitting.  · Within months, your lungs may clear up completely.  · Quitting for 10 years reduces your risk of developing lung cancer and heart disease to almost that of a nonsmoker.  · People who quit may see an improvement in their overall quality of life.  How do I quit smoking?         Smoking is an addiction with both physical and psychological effects, and longtime habits can be hard to change. Your health care provider can recommend:  · Programs and community resources, which may include group support, education, or talk therapy.  · Prescription medicines to help reduce cravings.  · Nicotine replacement products, such as patches, gum, and nasal sprays. Use these products only as directed. Do not replace cigarette smoking with electronic cigarettes, which are commonly called e-cigarettes. The safety of e-cigarettes is not known, and some may contain harmful chemicals.  · A combination of two or more of these methods.  Where to find more information  · American Lung Association: www.lung.org  · American Cancer Society: www.cancer.org  Summary  · Smoking cigarettes is very bad for your health. Cigarette smokers have an increased risk of many serious medical problems, including several cancers, heart disease, and stroke.  · Smoking is an addiction with both physical and psychological effects, and longtime habits can be hard to change.  · By stopping right away, you can greatly reduce the risk of medical problems for you and your family.  · To help you quit smoking, your health care provider can recommend programs, community resources, prescription medicines, and nicotine replacement products such as patches, gum, and nasal sprays.  This information is not intended to replace advice given to you by your health care provider. Make sure you discuss any questions you have with your health care provider.  Document Released: 01/25/2006 Document Revised: 03/21/2019 Document Reviewed:  12/22/2017  Elsevier Patient Education © 2020 [employer] Inc[employer]

## 2020-11-23 NOTE — PROGRESS NOTES
Subjective:     Encounter Date:11/24/2020      Patient ID: Sascha Oden Sr. is a 60 y.o. male   HPI: This patient presents today for follow-up of outpatient testing.  Holter monitor revealed predominantly normal sinus rhythm with occasional supraventricular ectopic beats with APC burden of 2.2%, rare premature ventricular contractions PVC burden of less than 1%, no significant pauses, single 6 beat run of nonsustained ventricular tachycardia at rate of 160 bpm and 18 runs of supraventricular tachycardia with longest being 26 beats in duration at a maximum rate of 164 bpm and average rate of 120 bpm.  He was scheduled to have a 2D echo and Lexiscan as well, however these were not completed.  He has a history of paroxysmal atrial fibrillation on chronic anticoagulation, peripheral vascular disease, COPD/emphysema, hypertension, hyperlipidemia and tobacco use.  He reports one syncopal episode since his last visit that occurred after acute onset of shortness of breath. He continues to report intermittent, substernal chest pressure that occurs once or twice a week and lasts for five minutes at a time. He reports chronic shortness of breath. Patient denies palpitations, dizziness, orthopnea, PND, edema or decreased stamina.  Patient denies any signs of bleeding.    Chief Complaint: Routine follow-up  Atrial Fibrillation  Presents for follow-up visit. Symptoms include chest pain, shortness of breath and syncope. Symptoms are negative for an AICD problem, bradycardia, dizziness, hemodynamic instability, hypertension, hypotension, pacemaker problem, palpitations, tachycardia and weakness. The symptoms have been stable. Past medical history includes atrial fibrillation and hyperlipidemia.   Hypertension  This is a chronic problem. The current episode started more than 1 year ago. The problem is controlled. Associated symptoms include chest pain and shortness of breath. Pertinent negatives include no anxiety, blurred vision,  headaches, malaise/fatigue, neck pain, orthopnea, palpitations, peripheral edema, PND or sweats. Risk factors for coronary artery disease include dyslipidemia. Current antihypertension treatment includes beta blockers. The current treatment provides significant improvement.   Hyperlipidemia  This is a chronic problem. The current episode started more than 1 year ago. Associated symptoms include chest pain and shortness of breath. Current antihyperlipidemic treatment includes statins. Risk factors for coronary artery disease include hypertension, male sex and dyslipidemia.       Previous Cardiac Testing:  Results for orders placed during the hospital encounter of 12/29/19   Adult Transthoracic Echo Complete With Contrast if Necessary Per Protocol (With Agitated Saline)    Narrative · Left ventricular systolic function is normal. Estimated traction   fraction is 56 to 60%.  · Left ventricular diastolic dysfunction.  · Normal right ventricular cavity size and systolic function noted.  · Left atrial cavity size is mildly dilated.  · There is no significant (greater than mild) valvular dysfunction.        No results found for this or any previous visit.      The following portions of the patient's history were reviewed and updated as appropriate: allergies, current medications, past family history, past medical history, past social history, past surgical history and problem list.    Allergies   Allergen Reactions   • Codeine GI Intolerance       Current Outpatient Medications:   •  albuterol sulfate  (90 Base) MCG/ACT inhaler, Inhale 2 puffs Every 4 (Four) Hours As Needed for Wheezing., Disp: 1 inhaler, Rfl: 0  •  apixaban (ELIQUIS) 5 MG tablet tablet, Take 1 tablet by mouth Every 12 (Twelve) Hours. Indications: Atrial Fibrillation, Disp: 60 tablet, Rfl: 0  •  aspirin 81 MG chewable tablet, Chew 1 tablet Daily., Disp: 30 tablet, Rfl: 0  •  fluticasone (FLONASE) 50 MCG/ACT nasal spray, 2 sprays by Each Nare route 2  (Two) Times a Day., Disp: 1 bottle, Rfl: 0  •  indomethacin (INDOCIN) 50 MG capsule, Take 1 capsule by mouth 3 (Three) Times a Day With Meals., Disp: 30 capsule, Rfl: 0  •  methocarbamol (ROBAXIN) 750 MG tablet, Take 750 mg by mouth 3 (Three) Times a Day As Needed., Disp: , Rfl: 3  •  metoprolol tartrate (LOPRESSOR) 100 MG tablet, Take 1 tablet by mouth 2 (Two) Times a Day., Disp: 180 tablet, Rfl: 3  •  nitroglycerin (NITROSTAT) 0.4 MG SL tablet, 1 under the tongue as needed for angina, may repeat q5mins for up three doses, Disp: 25 tablet, Rfl: 11  •  simvastatin (ZOCOR) 20 MG tablet, Take 20 mg by mouth Every Night., Disp: , Rfl: 3  Past Medical History:   Diagnosis Date   • Alcohol abuse    • Arthritis    • Blackout spell    • Chronic back pain 1991    s/p fall    • Emphysema of lung (CMS/HCC)    • Hypercholesterolemia     Statin therapy    • Hypertension    • PAP (pulmonary alveolar proteinosis) (CMS/HCC)    • PVD (peripheral vascular disease) (CMS/[facility])     Followed by Dr. Juarez; ASA, Plavix, leg revascularization    • Syncope    • Tobacco abuse     1 PPD     Past Surgical History:   Procedure Laterality Date   • ANGIOPLASTY     • AORTAGRAM Right 2/11/2019    Procedure: RIGHT LOWER EXTREMITY ANGIOGRAM;  Surgeon: Jitendra Juarez DO;  Location: [place] [address];  Service: Vascular   • APPENDECTOMY     • BACK SURGERY     • CAROTID STENT Bilateral    • HAND SURGERY Right    • LEG REVASCULARIZATION Bilateral    • VEIN SURGERY       Family History   Problem Relation Age of Onset   • Heart disease Other    • Diabetes Other    • Cancer Other    • Stroke Other    • Kidney disease Other    • Heart attack Maternal Grandfather    • Arrhythmia Paternal Grandmother    • Cancer Paternal Grandmother         colon   • Hypertension Mother    • Diabetes Mother    • Stroke Mother    • Kidney disease Father    • Cancer Paternal Grandfather         colon     Social History     Socioeconomic History   • Marital status:       Spouse name: Not on file   • Number of children: Not on file   • Years of education: Not on file   • Highest education level: Not on file   Tobacco Use   • Smoking status: Current Every Day Smoker     Packs/day: 1.00     Types: Cigarettes   • Smokeless tobacco: Never Used   Substance and Sexual Activity   • Alcohol use: Yes     Comment: WEEKLY   • Drug use: No   • Sexual activity: Defer       Review of Systems   Constitution: Negative for chills, decreased appetite, fever, malaise/fatigue, night sweats, weight gain and weight loss.   HENT: Negative for nosebleeds.    Eyes: Negative for blurred vision and visual disturbance.   Cardiovascular: Positive for chest pain, dyspnea on exertion and syncope. Negative for leg swelling, near-syncope, orthopnea, palpitations and paroxysmal nocturnal dyspnea.   Respiratory: Positive for cough and shortness of breath. Negative for hemoptysis, snoring and wheezing.    Endocrine: Negative for cold intolerance and heat intolerance.   Hematologic/Lymphatic: Does not bruise/bleed easily.   Skin: Negative for rash.   Musculoskeletal: Negative for back pain, falls and neck pain.   Gastrointestinal: Negative for abdominal pain, change in bowel habit, constipation, diarrhea, dysphagia, heartburn, nausea and vomiting.   Genitourinary: Negative for hematuria.   Neurological: Negative for dizziness, headaches, light-headedness and weakness.   Psychiatric/Behavioral: Negative for altered mental status.   Allergic/Immunologic: Negative for persistent infections.              Objective:     Vitals signs and nursing note reviewed.   Constitutional:       General: Awake.      Appearance: Normal and healthy appearance. Well-developed, normal weight and not in distress.   Eyes:      General: Lids are normal.      Conjunctiva/sclera: Conjunctivae normal.      Pupils: Pupils are equal, round, and reactive to light.   HENT:      Head: Normocephalic and atraumatic.      Nose: Nose normal.    Neck:      Musculoskeletal: Normal range of motion.      Vascular: No JVR. JVD normal.   Pulmonary:      Effort: Pulmonary effort is normal.      Breath sounds: Examination of the right-upper field reveals decreased breath sounds. Examination of the left-upper field reveals decreased breath sounds. Examination of the right-middle field reveals decreased breath sounds. Examination of the left-middle field reveals decreased breath sounds. Examination of the right-lower field reveals decreased breath sounds. Examination of the left-lower field reveals decreased breath sounds. Decreased breath sounds present. No wheezing. No rhonchi. No rales.   Chest:      Chest wall: Not tender to palpatation.   Cardiovascular:      PMI at left midclavicular line. Normal rate. Regular rhythm. Normal S1. Normal S2.      Murmurs: There is no murmur.      No gallop. No click. No rub.   Pulses:     Intact distal pulses.   Edema:     Peripheral edema absent.   Abdominal:      General: Bowel sounds are normal.      Palpations: Abdomen is soft.      Tenderness: There is no abdominal tenderness.   Musculoskeletal: Normal range of motion.         General: No tenderness.   Skin:     General: Skin is warm and dry.   Neurological:      General: No focal deficit present.      Mental Status: Alert, oriented to person, place, and time and oriented to person, place and time.   Psychiatric:         Attention and Perception: Attention and perception normal.         Mood and Affect: Mood and affect normal.         Speech: Speech normal.         Behavior: Behavior normal. Behavior is cooperative.         Thought Content: Thought content normal.         Cognition and Memory: Cognition and memory normal.         Judgment: Judgment normal.             ECG 12 Lead    Date/Time: 11/24/2020 1:33 PM  Performed by: Sary Ortiz APRN  Authorized by: Sary Ortiz APRN   Comparison: compared with previous ECG from 10/13/2020  Rhythm: sinus rhythm  Rate:  "normal  BPM: 69  T inversion: III    Clinical impression: abnormal EKG          /80   Pulse 69   Ht 193 cm (76\")   Wt 78.5 kg (173 lb)   SpO2 98%   BMI 21.06 kg/m²   Lab Review:   Lab Results   Component Value Date    WBC 13.7 (H) 10/17/2020    HGB 16.4 10/17/2020    HCT 48.1 10/17/2020    MCV 94.7 (H) 10/17/2020     10/17/2020     Lab Results   Component Value Date    GLUCOSE 87 09/03/2020    BUN 5 (L) 09/03/2020    CREATININE 0.6 09/03/2020    EGFRIFNONA >60 09/03/2020    EGFRIFAFRI >59 09/03/2020    BCR 30.3 (H) 01/07/2020    K 4.4 09/03/2020    CO2 25 09/03/2020    CALCIUM 9.3 09/03/2020    ALBUMIN 3.20 (L) 01/04/2020    AST 55 (H) 01/04/2020    ALT 54 (H) 01/04/2020     Lab Results   Component Value Date    CHLPL 137 (L) 09/03/2020    CHLPL 141 (L) 06/29/2018     Lab Results   Component Value Date    TRIG 57 09/03/2020    TRIG 74 06/29/2018     Lab Results   Component Value Date    HDL 68 09/03/2020    HDL 64 06/29/2018     Lab Results   Component Value Date    LDL 58 09/03/2020    LDL 62 06/29/2018       I have reviewed the most recent lab results.       Assessment:          Diagnosis Plan   1. PAF (paroxysmal atrial fibrillation) (CMS/[facility])  No recurrence on recent holter monitor. Anticoagulated.    2. Current use of long term anticoagulation  On Eliquis. Denies bleeding.    3. PAD (peripheral artery disease) (CMS/[facility])  S/p multiple vascular interventions. Bilateral carotid artery stenosis. Follows with Dr. Juarez with vascular surgery.    4. Panlobular emphysema (CMS/[facility])  Pt reports acute episodes of shortness of breath that result in syncope at times. Recommend follow up with PCP for optimization of COPD medications and possible referral to pulmonology.    5. Essential hypertension  Well controlled.    6. Hypercholesterolemia  Managed by PCP. Well controlled.    7. Paroxysmal SVT (supraventricular tachycardia) (CMS/[facility]) Increase metoprolol tartrate to 100 mg twice daily.    8. Tobacco " kusum Oden Sr.  reports that he has been smoking cigarettes. He has been smoking about 1.00 pack per day. He has never used smokeless tobacco.. I have educated him on the risk of diseases from using tobacco products such as cancer, COPD and heart disease.     I advised him to quit and he is not willing to quit.    I spent 3  minutes counseling the patient.                 Plan:         1. Increase metoprolol tartrate to 100 mg twice daily. Continue other medications as previously prescribed.  2. Report any worsening symptoms.  3. Report any signs of bleeding.  4. Continue heart healthy diet and regular exercise as tolerated.   5. Follow up with PCP for blood pressure and cholesterol management and routine lab work.  6. Follow up in one month, or sooner if needed.   7. Pt to be rescheduled for previously ordered 2D echo and Lexiscan.   8. Recommend follow up with PCP for optimization of COPD medications and possible referral to pulmonology.

## 2020-11-24 ENCOUNTER — OFFICE VISIT (OUTPATIENT)
Dept: CARDIOLOGY | Facility: CLINIC | Age: 60
End: 2020-11-24

## 2020-11-24 VITALS
BODY MASS INDEX: 21.07 KG/M2 | OXYGEN SATURATION: 98 % | SYSTOLIC BLOOD PRESSURE: 112 MMHG | WEIGHT: 173 LBS | HEART RATE: 69 BPM | HEIGHT: 76 IN | DIASTOLIC BLOOD PRESSURE: 80 MMHG

## 2020-11-24 DIAGNOSIS — E78.00 HYPERCHOLESTEROLEMIA: ICD-10-CM

## 2020-11-24 DIAGNOSIS — Z79.01 CURRENT USE OF LONG TERM ANTICOAGULATION: ICD-10-CM

## 2020-11-24 DIAGNOSIS — I10 ESSENTIAL HYPERTENSION: ICD-10-CM

## 2020-11-24 DIAGNOSIS — I47.1 PAROXYSMAL SVT (SUPRAVENTRICULAR TACHYCARDIA) (HCC): ICD-10-CM

## 2020-11-24 DIAGNOSIS — I48.0 PAF (PAROXYSMAL ATRIAL FIBRILLATION) (HCC): Primary | ICD-10-CM

## 2020-11-24 DIAGNOSIS — I73.9 PAD (PERIPHERAL ARTERY DISEASE) (HCC): ICD-10-CM

## 2020-11-24 DIAGNOSIS — J43.1 PANLOBULAR EMPHYSEMA (HCC): ICD-10-CM

## 2020-11-24 DIAGNOSIS — Z72.0 TOBACCO ABUSE: ICD-10-CM

## 2020-11-24 PROCEDURE — 93000 ELECTROCARDIOGRAM COMPLETE: CPT | Performed by: NURSE PRACTITIONER

## 2020-11-24 PROCEDURE — 99214 OFFICE O/P EST MOD 30 MIN: CPT | Performed by: NURSE PRACTITIONER

## 2020-11-24 RX ORDER — METOPROLOL TARTRATE 100 MG/1
100 TABLET ORAL 2 TIMES DAILY
Qty: 180 TABLET | Refills: 3 | Status: SHIPPED | OUTPATIENT
Start: 2020-11-24 | End: 2021-12-30

## 2020-12-02 ENCOUNTER — HOSPITAL ENCOUNTER (OUTPATIENT)
Dept: CARDIOLOGY | Facility: HOSPITAL | Age: 60
Discharge: HOME | End: 2020-12-02
Admitting: INTERNAL MEDICINE

## 2020-12-02 VITALS
DIASTOLIC BLOOD PRESSURE: 63 MMHG | HEIGHT: 75 IN | BODY MASS INDEX: 21.52 KG/M2 | SYSTOLIC BLOOD PRESSURE: 108 MMHG | WEIGHT: 173.06 LBS

## 2020-12-02 DIAGNOSIS — R06.09 DOE (DYSPNEA ON EXERTION): ICD-10-CM

## 2020-12-02 PROCEDURE — 93356 MYOCRD STRAIN IMG SPCKL TRCK: CPT

## 2020-12-02 PROCEDURE — 93306 TTE W/DOPPLER COMPLETE: CPT | Performed by: INTERNAL MEDICINE

## 2020-12-02 PROCEDURE — 93306 TTE W/DOPPLER COMPLETE: CPT

## 2020-12-02 PROCEDURE — 93356 MYOCRD STRAIN IMG SPCKL TRCK: CPT | Performed by: INTERNAL MEDICINE

## 2020-12-02 PROCEDURE — 25010000002 PERFLUTREN 6.52 MG/ML SUSPENSION: Performed by: INTERNAL MEDICINE

## 2020-12-02 RX ADMIN — PERFLUTREN 8.48 MG: 6.52 INJECTION, SUSPENSION INTRAVENOUS at 09:44

## 2020-12-04 LAB
BH CV ECHO MEAS - AO MAX PG (FULL): 2 MMHG
BH CV ECHO MEAS - AO MAX PG: 5.7 MMHG
BH CV ECHO MEAS - AO MEAN PG (FULL): 2 MMHG
BH CV ECHO MEAS - AO MEAN PG: 4 MMHG
BH CV ECHO MEAS - AO ROOT AREA (BSA CORRECTED): 1.8
BH CV ECHO MEAS - AO ROOT AREA: 11.3 CM^2
BH CV ECHO MEAS - AO ROOT DIAM: 3.8 CM
BH CV ECHO MEAS - AO V2 MAX: 119 CM/SEC
BH CV ECHO MEAS - AO V2 MEAN: 92 CM/SEC
BH CV ECHO MEAS - AO V2 VTI: 26.5 CM
BH CV ECHO MEAS - AVA(I,A): 3 CM^2
BH CV ECHO MEAS - AVA(I,D): 3 CM^2
BH CV ECHO MEAS - AVA(V,A): 3.6 CM^2
BH CV ECHO MEAS - AVA(V,D): 3.6 CM^2
BH CV ECHO MEAS - BSA(HAYCOCK): 2 M^2
BH CV ECHO MEAS - BSA: 2.1 M^2
BH CV ECHO MEAS - BZI_BMI: 21.1 KILOGRAMS/M^2
BH CV ECHO MEAS - BZI_METRIC_HEIGHT: 193 CM
BH CV ECHO MEAS - BZI_METRIC_WEIGHT: 78.5 KG
BH CV ECHO MEAS - EDV(CUBED): 128 ML
BH CV ECHO MEAS - EDV(MOD-SP4): 147 ML
BH CV ECHO MEAS - EDV(TEICH): 120.5 ML
BH CV ECHO MEAS - EF(CUBED): 74.9 %
BH CV ECHO MEAS - EF(MOD-SP4): 62.4 %
BH CV ECHO MEAS - EF(TEICH): 66.5 %
BH CV ECHO MEAS - ESV(CUBED): 32.2 ML
BH CV ECHO MEAS - ESV(MOD-SP4): 55.2 ML
BH CV ECHO MEAS - ESV(TEICH): 40.3 ML
BH CV ECHO MEAS - FS: 36.9 %
BH CV ECHO MEAS - IVS/LVPW: 1.1
BH CV ECHO MEAS - IVSD: 0.98 CM
BH CV ECHO MEAS - LA 3D VOL INDEX: 35.6
BH CV ECHO MEAS - LA DIMENSION: 3.7 CM
BH CV ECHO MEAS - LA/AO: 0.97
BH CV ECHO MEAS - LAT PEAK E' VEL: 10 CM/SEC
BH CV ECHO MEAS - LV DIASTOLIC VOL/BSA (35-75): 70.6 ML/M^2
BH CV ECHO MEAS - LV MASS(C)D: 167.4 GRAMS
BH CV ECHO MEAS - LV MASS(C)DI: 80.4 GRAMS/M^2
BH CV ECHO MEAS - LV MAX PG: 3.6 MMHG
BH CV ECHO MEAS - LV MEAN PG: 2 MMHG
BH CV ECHO MEAS - LV SYSTOLIC VOL/BSA (12-30): 26.5 ML/M^2
BH CV ECHO MEAS - LV V1 MAX: 95.3 CM/SEC
BH CV ECHO MEAS - LV V1 MEAN: 61.2 CM/SEC
BH CV ECHO MEAS - LV V1 VTI: 17.3 CM
BH CV ECHO MEAS - LVIDD: 5 CM
BH CV ECHO MEAS - LVIDS: 3.2 CM
BH CV ECHO MEAS - LVLD AP4: 9.4 CM
BH CV ECHO MEAS - LVLS AP4: 7.9 CM
BH CV ECHO MEAS - LVOT AREA (M): 4.5 CM^2
BH CV ECHO MEAS - LVOT AREA: 4.5 CM^2
BH CV ECHO MEAS - LVOT DIAM: 2.4 CM
BH CV ECHO MEAS - LVPWD: 0.88 CM
BH CV ECHO MEAS - MED PEAK E' VEL: 7.72 CM/SEC
BH CV ECHO MEAS - MV A MAX VEL: 48.5 CM/SEC
BH CV ECHO MEAS - MV DEC SLOPE: 232 CM/SEC^2
BH CV ECHO MEAS - MV DEC TIME: 0.28 SEC
BH CV ECHO MEAS - MV E MAX VEL: 65.5 CM/SEC
BH CV ECHO MEAS - MV E/A: 1.4
BH CV ECHO MEAS - RAP SYSTOLE: 5 MMHG
BH CV ECHO MEAS - RVSP: 9.4 MMHG
BH CV ECHO MEAS - SI(AO): 144.3 ML/M^2
BH CV ECHO MEAS - SI(CUBED): 46 ML/M^2
BH CV ECHO MEAS - SI(LVOT): 37.6 ML/M^2
BH CV ECHO MEAS - SI(MOD-SP4): 44.1 ML/M^2
BH CV ECHO MEAS - SI(TEICH): 38.5 ML/M^2
BH CV ECHO MEAS - SV(AO): 300.5 ML
BH CV ECHO MEAS - SV(CUBED): 95.9 ML
BH CV ECHO MEAS - SV(LVOT): 78.3 ML
BH CV ECHO MEAS - SV(MOD-SP4): 91.8 ML
BH CV ECHO MEAS - SV(TEICH): 80.1 ML
BH CV ECHO MEAS - TR MAX VEL: 105 CM/SEC
BH CV ECHO MEASUREMENTS AVERAGE E/E' RATIO: 7.39
LEFT ATRIUM VOLUME: 74.1 CM3
MAXIMAL PREDICTED HEART RATE: 162 BPM
STRESS TARGET HR: 138 BPM

## 2020-12-09 ENCOUNTER — HOSPITAL ENCOUNTER (OUTPATIENT)
Dept: CARDIOLOGY | Facility: HOSPITAL | Age: 60
Discharge: HOME | End: 2020-12-09

## 2020-12-09 VITALS — WEIGHT: 173.06 LBS | HEIGHT: 75 IN | BODY MASS INDEX: 21.52 KG/M2

## 2020-12-09 DIAGNOSIS — R07.2 PRECORDIAL CHEST PAIN: ICD-10-CM

## 2020-12-09 LAB
BH CV STRESS BP STAGE 1: NORMAL
BH CV STRESS COMMENTS STAGE 1: NORMAL
BH CV STRESS DOSE REGADENOSON STAGE 1: 0.4
BH CV STRESS DURATION MIN STAGE 1: 0
BH CV STRESS DURATION SEC STAGE 1: 10
BH CV STRESS HR STAGE 1: 87
BH CV STRESS PROTOCOL 1: NORMAL
BH CV STRESS RECOVERY BP: NORMAL MMHG
BH CV STRESS RECOVERY HR: 81 BPM
BH CV STRESS STAGE 1: 1
LV EF NUC BP: 55 %
MAXIMAL PREDICTED HEART RATE: 162 BPM
PERCENT MAX PREDICTED HR: 53.7 %
STRESS BASELINE BP: NORMAL MMHG
STRESS BASELINE HR: 56 BPM
STRESS PERCENT HR: 63 %
STRESS POST PEAK BP: NORMAL MMHG
STRESS POST PEAK HR: 87 BPM
STRESS TARGET HR: 138 BPM

## 2020-12-09 PROCEDURE — A9500 TC99M SESTAMIBI: HCPCS | Performed by: INTERNAL MEDICINE

## 2020-12-09 PROCEDURE — 78452 HT MUSCLE IMAGE SPECT MULT: CPT | Performed by: INTERNAL MEDICINE

## 2020-12-09 PROCEDURE — 0 TECHNETIUM SESTAMIBI: Performed by: INTERNAL MEDICINE

## 2020-12-09 PROCEDURE — 78452 HT MUSCLE IMAGE SPECT MULT: CPT

## 2020-12-09 PROCEDURE — 93018 CV STRESS TEST I&R ONLY: CPT | Performed by: INTERNAL MEDICINE

## 2020-12-09 PROCEDURE — 25010000002 REGADENOSON 0.4 MG/5ML SOLUTION: Performed by: INTERNAL MEDICINE

## 2020-12-09 PROCEDURE — 93017 CV STRESS TEST TRACING ONLY: CPT

## 2020-12-09 RX ADMIN — TECHNETIUM TC 99M SESTAMIBI 1 DOSE: 1 INJECTION INTRAVENOUS at 10:48

## 2020-12-09 RX ADMIN — REGADENOSON 0.4 MG: 0.08 INJECTION, SOLUTION INTRAVENOUS at 10:41

## 2020-12-09 RX ADMIN — TECHNETIUM TC 99M SESTAMIBI 1 DOSE: 1 INJECTION INTRAVENOUS at 09:59

## 2020-12-21 ENCOUNTER — TELEPHONE (OUTPATIENT)
Dept: CARDIOLOGY | Facility: CLINIC | Age: 60
End: 2020-12-21

## 2020-12-21 NOTE — TELEPHONE ENCOUNTER
PT IS NOT FEELING WELL WANTS TO KNOW IF WE CAN CALL HIM BACK WITH THE TEST RESULTS FROM 12/9/20

## 2020-12-29 PROBLEM — R94.39 ABNORMAL NUCLEAR STRESS TEST: Status: ACTIVE | Noted: 2020-12-29

## 2020-12-29 NOTE — PATIENT INSTRUCTIONS
Steps to Quit Smoking  Smoking tobacco is the leading cause of preventable death. It can affect almost every organ in the body. Smoking puts you and people around you at risk for many serious, long-lasting (chronic) diseases. Quitting smoking can be hard, but it is one of the best things that you can do for your health. It is never too late to quit.  How do I get ready to quit?  When you decide to quit smoking, make a plan to help you succeed. Before you quit:  · Pick a date to quit. Set a date within the next 2 weeks to give you time to prepare.  · Write down the reasons why you are quitting. Keep this list in places where you will see it often.  · Tell your family, friends, and co-workers that you are quitting. Their support is important.  · Talk with your doctor about the choices that may help you quit.  · Find out if your health insurance will pay for these treatments.  · Know the people, places, things, and activities that make you want to smoke (triggers). Avoid them.  What first steps can I take to quit smoking?  · Throw away all cigarettes at home, at work, and in your car.  · Throw away the things that you use when you smoke, such as ashtrays and lighters.  · Clean your car. Make sure to empty the ashtray.  · Clean your home, including curtains and carpets.  What can I do to help me quit smoking?  Talk with your doctor about taking medicines and seeing a counselor at the same time. You are more likely to succeed when you do both.  · If you are pregnant or breastfeeding, talk with your doctor about counseling or other ways to quit smoking. Do not take medicine to help you quit smoking unless your doctor tells you to do so.  To quit smoking:  Quit right away  · Quit smoking totally, instead of slowly cutting back on how much you smoke over a period of time.  · Go to counseling. You are more likely to quit if you go to counseling sessions regularly.  Take medicine  You may take medicines to help you quit. Some  medicines need a prescription, and some you can buy over-the-counter. Some medicines may contain a drug called nicotine to replace the nicotine in cigarettes. Medicines may:  · Help you to stop having the desire to smoke (cravings).  · Help to stop the problems that come when you stop smoking (withdrawal symptoms).  Your doctor may ask you to use:  · Nicotine patches, gum, or lozenges.  · Nicotine inhalers or sprays.  · Non-nicotine medicine that is taken by mouth.  Find resources  Find resources and other ways to help you quit smoking and remain smoke-free after you quit. These resources are most helpful when you use them often. They include:  · Online chats with a counselor.  · Phone quitlines.  · Printed self-help materials.  · Support groups or group counseling.  · Text messaging programs.  · Mobile phone apps. Use apps on your mobile phone or tablet that can help you stick to your quit plan. There are many free apps for mobile phones and tablets as well as websites. Examples include Quit Guide from the CDC and smokefree.gov    What things can I do to make it easier to quit?    · Talk to your family and friends. Ask them to support and encourage you.  · Call a phone quitline (3-764-QUITNOW), reach out to support groups, or work with a counselor.  · Ask people who smoke to not smoke around you.  · Avoid places that make you want to smoke, such as:  ? Bars.  ? Parties.  ? Smoke-break areas at work.  · Spend time with people who do not smoke.  · Lower the stress in your life. Stress can make you want to smoke. Try these things to help your stress:  ? Getting regular exercise.  ? Doing deep-breathing exercises.  ? Doing yoga.  ? Meditating.  ? Doing a body scan. To do this, close your eyes, focus on one area of your body at a time from head to toe. Notice which parts of your body are tense. Try to relax the muscles in those areas.  How will I feel when I quit smoking?  Day 1 to 3 weeks  Within the first 24 hours,  you may start to have some problems that come from quitting tobacco. These problems are very bad 2-3 days after you quit, but they do not often last for more than 2-3 weeks. You may get these symptoms:  · Mood swings.  · Feeling restless, nervous, angry, or annoyed.  · Trouble concentrating.  · Dizziness.  · Strong desire for high-sugar foods and nicotine.  · Weight gain.  · Trouble pooping (constipation).  · Feeling like you may vomit (nausea).  · Coughing or a sore throat.  · Changes in how the medicines that you take for other issues work in your body.  · Depression.  · Trouble sleeping (insomnia).  Week 3 and afterward  After the first 2-3 weeks of quitting, you may start to notice more positive results, such as:  · Better sense of smell and taste.  · Less coughing and sore throat.  · Slower heart rate.  · Lower blood pressure.  · Clearer skin.  · Better breathing.  · Fewer sick days.  Quitting smoking can be hard. Do not give up if you fail the first time. Some people need to try a few times before they succeed. Do your best to stick to your quit plan, and talk with your doctor if you have any questions or concerns.  Summary  · Smoking tobacco is the leading cause of preventable death. Quitting smoking can be hard, but it is one of the best things that you can do for your health.  · When you decide to quit smoking, make a plan to help you succeed.  · Quit smoking right away, not slowly over a period of time.  · When you start quitting, seek help from your doctor, family, or friends.  This information is not intended to replace advice given to you by your health care provider. Make sure you discuss any questions you have with your health care provider.  Document Revised: 09/11/2020 Document Reviewed: 03/07/2020  Elsevier Patient Education © 2020 [employer]  Health Risks of Smoking  Smoking cigarettes is very bad for your health. Tobacco smoke has over 200 known poisons in it. It contains the poisonous gases  nitrogen oxide and carbon monoxide. There are over 60 chemicals in tobacco smoke that cause cancer.  Smoking is difficult to quit because a chemical in tobacco, called nicotine, causes addiction or dependence. When you smoke and inhale, nicotine is absorbed rapidly into the bloodstream through your lungs. Both inhaled and non-inhaled nicotine may be addictive.  What are the risks of cigarette smoke?  Cigarette smokers have an increased risk of many serious medical problems, including:  · Lung cancer.  · Lung disease, such as pneumonia, bronchitis, and emphysema.  · Chest pain (angina) and heart attack because the heart is not getting enough oxygen.  · Heart disease and peripheral blood vessel disease.  · High blood pressure (hypertension).  · Stroke.  · Oral cancer, including cancer of the lip, mouth, or voice box.  · Bladder cancer.  · Pancreatic cancer.  · Cervical cancer.  · Pregnancy complications, including premature birth.  · Stillbirths and smaller  babies, birth defects, and genetic damage to sperm.  · Early menopause.  · Lower estrogen level for women.  · Infertility.  · Facial wrinkles.  · Blindness.  · Increased risk of broken bones (fractures).  · Senile dementia.  · Stomach ulcers and internal bleeding.  · Delayed wound healing and increased risk of complications during surgery.  · Even smoking lightly shortens your life expectancy by several years.  Because of secondhand smoke exposure, children of smokers have an increased risk of the following:  · Sudden infant death syndrome (SIDS).  · Respiratory infections.  · Lung cancer.  · Heart disease.  · Ear infections.  What are the benefits of quitting?  There are many health benefits of quitting smoking. Here are some of them:  · Within days of quitting smoking, your risk of having a heart attack decreases, your blood flow improves, and your lung capacity improves. Blood pressure, pulse rate, and breathing patterns start returning to normal soon  after quitting.  · Within months, your lungs may clear up completely.  · Quitting for 10 years reduces your risk of developing lung cancer and heart disease to almost that of a nonsmoker.  · People who quit may see an improvement in their overall quality of life.  How do I quit smoking?         Smoking is an addiction with both physical and psychological effects, and longtime habits can be hard to change. Your health care provider can recommend:  · Programs and community resources, which may include group support, education, or talk therapy.  · Prescription medicines to help reduce cravings.  · Nicotine replacement products, such as patches, gum, and nasal sprays. Use these products only as directed. Do not replace cigarette smoking with electronic cigarettes, which are commonly called e-cigarettes. The safety of e-cigarettes is not known, and some may contain harmful chemicals.  · A combination of two or more of these methods.  Where to find more information  · American Lung Association: www.lung.org  · American Cancer Society: www.cancer.org  Summary  · Smoking cigarettes is very bad for your health. Cigarette smokers have an increased risk of many serious medical problems, including several cancers, heart disease, and stroke.  · Smoking is an addiction with both physical and psychological effects, and longtime habits can be hard to change.  · By stopping right away, you can greatly reduce the risk of medical problems for you and your family.  · To help you quit smoking, your health care provider can recommend programs, community resources, prescription medicines, and nicotine replacement products such as patches, gum, and nasal sprays.  This information is not intended to replace advice given to you by your health care provider. Make sure you discuss any questions you have with your health care provider.  Document Revised: 03/21/2019 Document Reviewed: 12/22/2017  Elsevier Patient Education © 2020 [employer]

## 2020-12-29 NOTE — PROGRESS NOTES
Subjective:     Encounter Date:12/30/2020      Patient ID: Sascha Oden Sr. is a 60 y.o. male   HPI: His telephone visit today is for follow-up of medication adjustments and outpatient testing.  Toprol tartrate was increased to 100 mg twice daily at his last office visit 11/24/2020.  2D echo on 12/3/2020 revealed normal left ventricular systolic function with ejection fraction 56 to 60%, mildly increased left atrial volume, normal left ventricular diastolic function, no evidence of pulmonary hypertension, no significant valvular disease and abnormal global longitudinal LV strain of -13%.  Cyndie scan on 12/9/2020 revealed small sized area of ischemia in the mid septal wall.  He has a history of paroxysmal atrial fibrillation on chronic anticoagulation, peripheral vascular disease, COPD/emphysema, hypertension, hyperlipidemia and tobacco use. He reports improvement in chest pain since increasing his metoprolol dose at his last visit.  He reports chronic shortness of breath. Patient denies palpitations, dizziness, orthopnea, PND, edema or decreased stamina.  Patient denies any signs of bleeding.    Chief Complaint: Routine follow-up  Atrial Fibrillation  Presents for follow-up visit. Symptoms include shortness of breath. Symptoms are negative for an AICD problem, bradycardia, chest pain, dizziness, hemodynamic instability, hypertension, hypotension, pacemaker problem, palpitations, syncope, tachycardia and weakness. The symptoms have been stable. Past medical history includes atrial fibrillation and hyperlipidemia.   Hypertension  This is a chronic problem. The current episode started more than 1 year ago. The problem is controlled. Associated symptoms include shortness of breath. Pertinent negatives include no anxiety, blurred vision, chest pain, headaches, malaise/fatigue, neck pain, orthopnea, palpitations, peripheral edema, PND or sweats. Risk factors for coronary artery disease include dyslipidemia. Current  antihypertension treatment includes beta blockers. The current treatment provides significant improvement.   Hyperlipidemia  This is a chronic problem. The current episode started more than 1 year ago. Associated symptoms include shortness of breath. Pertinent negatives include no chest pain. Current antihyperlipidemic treatment includes statins. Risk factors for coronary artery disease include hypertension, male sex and dyslipidemia.       Previous Cardiac Testing:  Results for orders placed during the hospital encounter of 12/02/20   Adult Transthoracic Echo Complete W/ Cont if Necessary Per Protocol    Narrative · Left ventricular ejection fraction appears to be 56 - 60%. Left   ventricular systolic function is normal.  · Abnormal global longitudinal LV strain (GLS) = -13%.  · Left atrial volume is mildly increased.  · Left ventricular diastolic function was normal.  · No evidence of pulmonary hypertension is present.        No results found for this or any previous visit.      The following portions of the patient's history were reviewed and updated as appropriate: allergies, current medications, past family history, past medical history, past social history, past surgical history and problem list.    Allergies   Allergen Reactions   • Codeine GI Intolerance       Current Outpatient Medications:   •  apixaban (ELIQUIS) 5 MG tablet tablet, Take 1 tablet by mouth Every 12 (Twelve) Hours. Indications: Atrial Fibrillation, Disp: 60 tablet, Rfl: 0  •  aspirin 81 MG chewable tablet, Chew 1 tablet Daily., Disp: 30 tablet, Rfl: 0  •  methocarbamol (ROBAXIN) 750 MG tablet, Take 750 mg by mouth 3 (Three) Times a Day As Needed., Disp: , Rfl: 3  •  metoprolol tartrate (LOPRESSOR) 100 MG tablet, Take 1 tablet by mouth 2 (Two) Times a Day., Disp: 180 tablet, Rfl: 3  •  nitroglycerin (NITROSTAT) 0.4 MG SL tablet, 1 under the tongue as needed for angina, may repeat q5mins for up three doses, Disp: 25 tablet, Rfl: 11  •   simvastatin (ZOCOR) 20 MG tablet, Take 20 mg by mouth Every Night., Disp: , Rfl: 3  •  albuterol sulfate  (90 Base) MCG/ACT inhaler, Inhale 2 puffs Every 4 (Four) Hours As Needed for Wheezing., Disp: 1 inhaler, Rfl: 0  •  fluticasone (FLONASE) 50 MCG/ACT nasal spray, 2 sprays by Each Nare route 2 (Two) Times a Day., Disp: 1 bottle, Rfl: 0  •  indomethacin (INDOCIN) 50 MG capsule, Take 1 capsule by mouth 3 (Three) Times a Day With Meals., Disp: 30 capsule, Rfl: 0  Past Medical History:   Diagnosis Date   • Alcohol abuse    • Arthritis    • Blackout spell    • Chronic back pain 1991    s/p fall    • Emphysema of lung (CMS/[facility])    • Hypercholesterolemia     Statin therapy    • Hypertension    • PAP (pulmonary alveolar proteinosis) (CMS/[facility])    • PVD (peripheral vascular disease) (CMS/[facility])     Followed by Dr. Juarez; ASA, Plavix, leg revascularization    • Syncope    • Tobacco abuse     1 PPD     Past Surgical History:   Procedure Laterality Date   • ANGIOPLASTY     • AORTAGRAM Right 2/11/2019    Procedure: RIGHT LOWER EXTREMITY ANGIOGRAM;  Surgeon: Jitendra Juarez DO;  Location: [facility] OR ;  Service: Vascular   • APPENDECTOMY     • BACK SURGERY     • CAROTID STENT Bilateral    • HAND SURGERY Right    • LEG REVASCULARIZATION Bilateral    • VEIN SURGERY       Family History   Problem Relation Age of Onset   • Heart disease Other    • Diabetes Other    • Cancer Other    • Stroke Other    • Kidney disease Other    • Heart attack Maternal Grandfather    • Arrhythmia Paternal Grandmother    • Cancer Paternal Grandmother         colon   • Hypertension Mother    • Diabetes Mother    • Stroke Mother    • Kidney disease Father    • Cancer Paternal Grandfather         colon     Social History     Socioeconomic History   • Marital status:      Spouse name: Not on file   • Number of children: Not on file   • Years of education: Not on file   • Highest education level: Not on file   Tobacco Use   •  "Smoking status: Current Every Day Smoker     Packs/day: 1.00     Types: Cigarettes   • Smokeless tobacco: Never Used   Substance and Sexual Activity   • Alcohol use: Yes     Comment: WEEKLY   • Drug use: No   • Sexual activity: Defer       Review of Systems   Constitution: Negative for chills, decreased appetite, fever, malaise/fatigue, night sweats, weight gain and weight loss.   HENT: Negative for nosebleeds.    Eyes: Negative for blurred vision and visual disturbance.   Cardiovascular: Positive for dyspnea on exertion. Negative for chest pain, leg swelling, near-syncope, orthopnea, palpitations, paroxysmal nocturnal dyspnea and syncope.   Respiratory: Positive for cough and shortness of breath. Negative for hemoptysis, snoring and wheezing.    Endocrine: Negative for cold intolerance and heat intolerance.   Hematologic/Lymphatic: Does not bruise/bleed easily.   Skin: Negative for rash.   Musculoskeletal: Negative for back pain, falls and neck pain.   Gastrointestinal: Negative for abdominal pain, change in bowel habit, constipation, diarrhea, dysphagia, heartburn, nausea and vomiting.   Genitourinary: Negative for hematuria.   Neurological: Negative for dizziness, headaches, light-headedness and weakness.   Psychiatric/Behavioral: Negative for altered mental status.   Allergic/Immunologic: Negative for persistent infections.              Objective:     Physical Exam      Procedures  /80   Ht 190.3 cm (74.92\")   Wt 83.9 kg (185 lb)   SpO2 94%   BMI 23.17 kg/m²   Lab Review:   Lab Results   Component Value Date    WBC 13.7 (H) 10/17/2020    HGB 16.4 10/17/2020    HCT 48.1 10/17/2020    MCV 94.7 (H) 10/17/2020     10/17/2020     Lab Results   Component Value Date    GLUCOSE 87 09/03/2020    BUN 5 (L) 09/03/2020    CREATININE 0.6 09/03/2020    EGFRIFNONA >60 09/03/2020    EGFRIFAFRI >59 09/03/2020    BCR 30.3 (H) 01/07/2020    K 4.4 09/03/2020    CO2 25 09/03/2020    CALCIUM 9.3 09/03/2020    ALBUMIN " 3.20 (L) 01/04/2020    AST 55 (H) 01/04/2020    ALT 54 (H) 01/04/2020     Lab Results   Component Value Date    CHLPL 137 (L) 09/03/2020    CHLPL 141 (L) 06/29/2018     Lab Results   Component Value Date    TRIG 57 09/03/2020    TRIG 74 06/29/2018     Lab Results   Component Value Date    HDL 68 09/03/2020    HDL 64 06/29/2018     Lab Results   Component Value Date    LDL 58 09/03/2020    LDL 62 06/29/2018       I have reviewed the most recent lab results.       Assessment:          Diagnosis Plan   1. PAF (paroxysmal atrial fibrillation) (CMS/[facility])  No recurrence on recent holter monitor. Anticoagulated.    2. Current use of long term anticoagulation  On Eliquis. Denies bleeding.    3. PAD (peripheral artery disease) (CMS/[facility])  S/p multiple vascular interventions. Bilateral carotid artery stenosis. Follows with Dr. Juarez with vascular surgery.    4. Panlobular emphysema (CMS/[facility])  Pt reports acute episodes of shortness of breath that result in syncope at times. Recommend follow up with PCP for optimization of COPD medications and possible referral to pulmonology.    5. Essential hypertension  Well controlled.    6. Hypercholesterolemia  Managed by PCP. Well controlled.    7. Paroxysmal SVT (supraventricular tachycardia) (CMS/[facility])    8. Tobacco abuse  Sascha Oden Sr.  reports that he has been smoking cigarettes. He has been smoking about 1.00 pack per day. He has never used smokeless tobacco.. I have educated him on the risk of diseases from using tobacco products such as cancer, COPD and heart disease.     I advised him to quit and he is not willing to quit.    I spent 3  minutes counseling the patient.          9. Abnormal nuclear stress test  No clinical signs of ischemia currently. Consider left heart cath, if symptoms worsen.           Plan:         1. Continue medications as previously prescribed.  2. Report any worsening symptoms.  3. Report any signs of bleeding.  4. Continue heart healthy diet and regular  exercise as tolerated.   5. Follow up with PCP for blood pressure and cholesterol management and routine lab work.  6. Follow up with Dr. Johnson in six months, or sooner if needed.   7. Recommend follow up with PCP for optimization of COPD medications and possible referral to pulmonology.     This visit has been rescheduled as a phone visit to comply with patient safety concerns in accordance with CDC recommendations. Total time of discussion was 3 minutes.    You have chosen to receive care through a telephone visit. Do you consent to use a telephone visit for your medical care today? Yes

## 2020-12-30 ENCOUNTER — OFFICE VISIT (OUTPATIENT)
Dept: CARDIOLOGY | Facility: CLINIC | Age: 60
End: 2020-12-30

## 2020-12-30 VITALS
HEIGHT: 75 IN | WEIGHT: 185 LBS | BODY MASS INDEX: 23 KG/M2 | DIASTOLIC BLOOD PRESSURE: 80 MMHG | OXYGEN SATURATION: 94 % | SYSTOLIC BLOOD PRESSURE: 128 MMHG

## 2020-12-30 DIAGNOSIS — I10 ESSENTIAL HYPERTENSION: ICD-10-CM

## 2020-12-30 DIAGNOSIS — J43.1 PANLOBULAR EMPHYSEMA (HCC): ICD-10-CM

## 2020-12-30 DIAGNOSIS — I73.9 PAD (PERIPHERAL ARTERY DISEASE) (HCC): ICD-10-CM

## 2020-12-30 DIAGNOSIS — Z79.01 CURRENT USE OF LONG TERM ANTICOAGULATION: ICD-10-CM

## 2020-12-30 DIAGNOSIS — I48.0 PAF (PAROXYSMAL ATRIAL FIBRILLATION) (HCC): Primary | ICD-10-CM

## 2020-12-30 DIAGNOSIS — R94.39 ABNORMAL NUCLEAR STRESS TEST: ICD-10-CM

## 2020-12-30 DIAGNOSIS — E78.00 HYPERCHOLESTEROLEMIA: ICD-10-CM

## 2020-12-30 DIAGNOSIS — I47.1 PAROXYSMAL SVT (SUPRAVENTRICULAR TACHYCARDIA) (HCC): ICD-10-CM

## 2020-12-30 DIAGNOSIS — Z72.0 TOBACCO ABUSE: ICD-10-CM

## 2020-12-30 PROCEDURE — 99441 PR PHYS/QHP TELEPHONE EVALUATION 5-10 MIN: CPT | Performed by: NURSE PRACTITIONER

## 2021-03-11 ENCOUNTER — TELEPHONE (OUTPATIENT)
Dept: VASCULAR SURGERY | Facility: CLINIC | Age: 61
End: 2021-03-11

## 2021-03-11 NOTE — TELEPHONE ENCOUNTER
Spoke with Mr Oden reminding him of his appointments for Friday, March 12th, 2021. [facility] for 9 o'clock testing and follow up afterwards at 1115 am with Ijeoma FREITAS.     Mr Oden stated he didn't think he should come tomorrow as he hasn't been feeling well, he is all stopped up and barely has any taste or smell so I transferred Mr Oden over to our scheduling department [id number] opt #2 to get his testing rescheduled and then made him a new office appointment.

## 2021-03-12 ENCOUNTER — APPOINTMENT (OUTPATIENT)
Dept: ULTRASOUND IMAGING | Facility: HOSPITAL | Age: 61
End: 2021-03-12

## 2021-04-09 ENCOUNTER — TELEPHONE (OUTPATIENT)
Dept: VASCULAR SURGERY | Facility: CLINIC | Age: 61
End: 2021-04-09

## 2021-04-09 NOTE — TELEPHONE ENCOUNTER
Spoke with pt about rescheduling testing and follow up appt due to a NO SHOW for testing on 04/05. I informed pt that I rescheduled him for 04/26 and has to be @ @1230. And then follow up with Ijeoma Johns at 145 on 04/28/2021. I also confirmed his mailing address to send him an appt reminder in the mail. Pt confirmed address and expressed understanding.

## 2021-04-26 ENCOUNTER — APPOINTMENT (OUTPATIENT)
Dept: ULTRASOUND IMAGING | Facility: HOSPITAL | Age: 61
End: 2021-04-26

## 2021-05-12 ENCOUNTER — HOSPITAL ENCOUNTER (OUTPATIENT)
Dept: ULTRASOUND IMAGING | Facility: HOSPITAL | Age: 61
Discharge: HOME | End: 2021-05-12

## 2021-05-12 ENCOUNTER — OFFICE VISIT (OUTPATIENT)
Dept: VASCULAR SURGERY | Facility: CLINIC | Age: 61
End: 2021-05-12

## 2021-05-12 VITALS
SYSTOLIC BLOOD PRESSURE: 110 MMHG | DIASTOLIC BLOOD PRESSURE: 74 MMHG | WEIGHT: 178.4 LBS | OXYGEN SATURATION: 94 % | BODY MASS INDEX: 22.18 KG/M2 | HEART RATE: 88 BPM | HEIGHT: 75 IN

## 2021-05-12 DIAGNOSIS — I10 ESSENTIAL HYPERTENSION: ICD-10-CM

## 2021-05-12 DIAGNOSIS — E78.00 HYPERCHOLESTEROLEMIA: ICD-10-CM

## 2021-05-12 DIAGNOSIS — Z79.02 ENCOUNTER FOR MONITORING ANTIPLATELET THERAPY: ICD-10-CM

## 2021-05-12 DIAGNOSIS — Z01.818 PREOP TESTING: ICD-10-CM

## 2021-05-12 DIAGNOSIS — I73.9 PAD (PERIPHERAL ARTERY DISEASE) (HCC): ICD-10-CM

## 2021-05-12 DIAGNOSIS — I73.9 PAD (PERIPHERAL ARTERY DISEASE) (HCC): Primary | ICD-10-CM

## 2021-05-12 DIAGNOSIS — Z51.81 ENCOUNTER FOR MONITORING ANTIPLATELET THERAPY: ICD-10-CM

## 2021-05-12 DIAGNOSIS — I65.23 BILATERAL CAROTID ARTERY STENOSIS: ICD-10-CM

## 2021-05-12 PROCEDURE — 93923 UPR/LXTR ART STDY 3+ LVLS: CPT | Performed by: SURGERY

## 2021-05-12 PROCEDURE — 93880 EXTRACRANIAL BILAT STUDY: CPT | Performed by: SURGERY

## 2021-05-12 PROCEDURE — 99214 OFFICE O/P EST MOD 30 MIN: CPT | Performed by: NURSE PRACTITIONER

## 2021-05-12 PROCEDURE — 93880 EXTRACRANIAL BILAT STUDY: CPT

## 2021-05-12 PROCEDURE — 93923 UPR/LXTR ART STDY 3+ LVLS: CPT

## 2021-05-12 NOTE — PROGRESS NOTES
5/12/2021       Narendra Camara MD  [address] [place] [address] [address]  [place] [address]        Sascha MARY KAY Oden Sr.  1960    Chief Complaint   Patient presents with   • Follow-up     6 Month Follow Up -PAD (peripheral artery disease- testing comp 05/12/2021 [facility] and bilateral carotid- pt states left leg is bothering him. Pt denies stroke symptoms- pt pain 10/10 after walking for some time.    • smoking status     everday smoker   • Med Management     verbally reviewed meds with pt        Dear Narendra Camara MD:    HPI     I had the pleasure of seeing you patient in the office today for follow up.  As you recall, the patient is a 60 y.o. male who we are currently following for bilateral lower extremity PAD.  He has had multiple vascular interventions, most recently right lower extremity in February 2019.   He does report being taken off of his chronic pain medication which is causing his discomfort to his left hip back.  He is maintained at this time on aspirin and Zocor.  He was taken off Plavix because the hospital but all found him to be in atrial fibrillation and began Eliquis however he reports his primary care provider did not continue this.  For the past month he is having complaints of short distance claudication to his left leg.  He reports not being able to walk to his mailbox or into the grocery store without significant discomfort.  Intervention to his left lower extremity looks like was in 2016.  He did have noninvasive testing performed today, which I did review in office.        Review of Systems   Constitutional: Negative.    HENT: Negative.    Eyes: Negative.    Respiratory: Negative.    Cardiovascular: Negative.         Claudication left leg   Gastrointestinal: Negative.    Endocrine: Negative.    Genitourinary: Negative.    Musculoskeletal: Positive for arthralgias and back pain.   Skin: Negative.    Allergic/Immunologic: Negative.    Neurological: Negative.    Hematological: Negative.   "  Psychiatric/Behavioral: Negative.    All other systems reviewed and are negative.       /74 (BP Location: Right arm, Patient Position: Sitting, Cuff Size: Adult)   Pulse 88   Ht 190.5 cm (75\")   Wt 80.9 kg (178 lb 6.4 oz)   SpO2 94%   BMI 22.30 kg/m²   Physical Exam  Vitals and nursing note reviewed.   Constitutional:       General: He is not in acute distress.     Appearance: Normal appearance. He is well-developed. He is not diaphoretic.   HENT:      Head: Normocephalic and atraumatic.   Neck:      Vascular: No carotid bruit or JVD.   Cardiovascular:      Rate and Rhythm: Normal rate and regular rhythm.      Pulses:           Femoral pulses are 2+ on the right side and 2+ on the left side.       Popliteal pulses are 2+ on the right side.        Dorsalis pedis pulses are 2+ on the right side.        Posterior tibial pulses are 2+ on the right side and detected w/ Doppler on the left side.      Heart sounds: Normal heart sounds, S1 normal and S2 normal. No murmur heard.   No friction rub. No gallop.       Comments: Left doppler PT/peroneal  Pulmonary:      Effort: Pulmonary effort is normal.      Breath sounds: Normal breath sounds.   Abdominal:      General: Bowel sounds are normal. There is no abdominal bruit.      Palpations: Abdomen is soft.      Tenderness: There is no abdominal tenderness.   Musculoskeletal:         General: Normal range of motion.      Cervical back: Normal range of motion and neck supple.   Skin:     General: Skin is warm and dry.   Neurological:      General: No focal deficit present.      Mental Status: He is alert and oriented to person, place, and time.      Cranial Nerves: No cranial nerve deficit.   Psychiatric:         Mood and Affect: Mood normal.         Behavior: Behavior normal.         Thought Content: Thought content normal.         Judgment: Judgment normal.            Diagnostic data:      Patient Active Problem List   Diagnosis   • Pneumonia of both lower lobes " due to infectious organism   • Sinus tachycardia   • Tobacco abuse   • Panlobular emphysema (CMS/[facility])   • Cough with hemoptysis   • Coffee ground emesis   • Paroxysmal SVT (supraventricular tachycardia) (CMS/[facility])   • Precordial chest pain   • Hypercholesterolemia   • Hypertension   • Chronic back pain   • PAD (peripheral artery disease) (CMS/[facility])   • Rhabdomyolysis   • Hyponatremia   • Alcoholism /alcohol abuse (CMS/[facility])   • Syncope and collapse   • Diarrhea   • Cough   • COPD with acute exacerbation (CMS/[facility])   • CAP (community acquired pneumonia)   • Thrombocytopenia (CMS/[facility])   • Hypokalemia   • Influenza A   • PAF (paroxysmal atrial fibrillation) (CMS/[facility])   • Acute respiratory failure with hypoxia (CMS/[facility])   • BUCKNER (dyspnea on exertion)   • Current use of long term anticoagulation   • Abnormal nuclear stress test         ICD-10-CM ICD-9-CM   1. PAD (peripheral artery disease) (CMS/[facility])  I73.9 443.9   2. Preop testing  Z01.818 V72.84   3. Encounter for monitoring antiplatelet therapy  Z51.81 V58.83    Z79.02 V58.63   4. Hypercholesterolemia  E78.00 272.0   5. Essential hypertension  I10 401.9         PLAN: After thoroughly evaluating Sascha MUÑIZ Cecille Peterson., I believe the best course of action is to proceed with a left lower extremity angiogram.  He is having significant short distance claudication over the past month and testing does show him in the moderate range which was previously mild.    Risks of angiogram were discussed.  These include, but are not limited to, bleeding, infection, vessel damage, nerve damage, embolus, and loss of limb.  The patient understands these risks and wishes to proceed with procedure.   I did discuss vascular risk factors as they pertain to the progression of vascular disease including controlling his hypertension, hyperlipidemia, and smoking cessation.  His blood pressure is stable on his current medication he is maintained on Zocor for his hyperlipidemia.  I did  him regarding  smoking cessation and increased frequency of vascular intervention as long as he continues to smoke. Body mass index is 22.3 kg/m². The patient is to continue taking their medications as previously discussed.   This was all discussed in full with complete understanding.    Thank you for allowing me to participate in the care of your patient.  Please do not hesitate to call with any questions or concerns.  We will keep you aware of any further encounters with Sascha Oden Sr..      Sincerely Yours,        FORTINO Dubose

## 2021-05-12 NOTE — PATIENT INSTRUCTIONS
Angiogram    An angiogram is a procedure used to examine the blood vessels. In this procedure, contrast dye is injected through a long, thin tube (catheter) into an artery. X-rays are then taken, which show if there is a blockage or problem in a blood vessel.  The catheter may be inserted in:  · The groin area. This is the most common.  · The fold of the arm, near the elbow.  · The wrist.  Tell a health care provider about:  · Any allergies you have, including allergies to medicines, shellfish, contrast dye, or iodine.  · All medicines you are taking, including vitamins, herbs, eye drops, creams, and over-the-counter medicines.  · Any problems you or family members have had with anesthetic medicines.  · Any blood disorders you have.  · Any surgeries you have had.  · Any medical conditions you have or have had, including any kidney problems or kidney failure.  · Whether you are pregnant or may be pregnant.  · Whether you are breastfeeding.  What are the risks?  Generally, this is a safe procedure. However, problems may occur, including:  · Infection.  · Bleeding and bruising.  · Allergic reactions to medicines or dyes, including the contrast dye used.  · Damage to nearby structures or organs, including damage to blood vessels and kidney damage from the contrast dye.  · Blood clots that can lead to a stroke or heart attack.  What happens before the procedure?  Staying hydrated  Follow instructions from your health care provider about hydration, which may include:  · Up to 2 hours before the procedure - you may continue to drink clear liquids, such as water, clear fruit juice, black coffee, and plain tea.    Eating and drinking restrictions  Follow instructions from your health care provider about eating and drinking, which may include:  · 8 hours before the procedure - stop eating heavy meals or foods, such as meat, fried foods, or fatty foods.  · 6 hours before the procedure - stop eating light meals or foods, such  as toast or cereal.  · 2 hours before the procedure - stop drinking clear liquids.  Medicines  Ask your health care provider about:  · Changing or stopping your regular medicines. This is especially important if you are taking diabetes medicines or blood thinners.  · Taking medicines such as aspirin and ibuprofen. These medicines can thin your blood. Do not take these medicines unless your health care provider tells you to take them.  · Taking over-the-counter medicines, vitamins, herbs, and supplements.  Surgery safety  Ask your health care provider:  · How your insertion site will be marked.  · What steps will be taken to help prevent infection. These may include:  ? Removing hair at the insertion site.  ? Washing skin with a germ-killing soap.  ? Taking antibiotic medicine.  General instructions  · Do not use any products that contain nicotine or tobacco for at least 4 weeks before the procedure. These products include cigarettes, e-cigarettes, and chewing tobacco. If you need help quitting, ask your health care provider.  · You may have blood samples taken.  · Plan to have someone take you home from the hospital or clinic.  · If you will be going home right after the procedure, plan to have someone with you for 24 hours.  What happens during the procedure?  · You will lie on your back on an X-ray table. You may be strapped to the table if it is tilted.  · An IV will be inserted into one of your veins.  · Electrodes may be placed on your chest to monitor your heart rate during the procedure.  · You will be given one or both of the following:  ? A medicine to help you relax (sedative).  ? A medicine to numb the area where the catheter will be inserted (local anesthetic).  · A small incision will be made for catheter insertion.  · The catheter will be inserted into an artery using a guide wire. An X-ray procedure (fluoroscopy) will be used to help guide the catheter to the blood vessel to be examined.  · A contrast  dye will then be injected into the catheter, and X-rays will be taken. The contrast will help to show where any narrowing or blockages are located in the blood vessels. You may feel flushed as the contrast dye is injected.  · Tell your health care provider if you develop chest pain or trouble breathing.  · After the fluoroscopy is complete, the catheter will be removed.  · A bandage (dressing) will be placed over the site where the catheter was inserted. Pressure will be applied to help stop any bleeding.  · The IV will be removed.  The procedure may vary among health care providers and hospitals.  What happens after the procedure?  · Your blood pressure, heart rate, breathing rate, and blood oxygen level will be monitored until you leave the hospital or clinic.  · You will be kept in bed lying flat for 6 hours. If the catheter was inserted through your leg, you will be instructed not to bend or cross your legs.  · The insertion area and the pulse in your feet or wrist will be checked frequently.  · You will be instructed to drink plenty of fluids. This will help wash the contrast dye out of your body.  · You may have more blood tests and X-rays. You may also have a test that records the electrical activity of your heart (electrocardiogram, or ECG).  · Do not drive for 24 hours if you were given a sedative during your procedure.  · It is up to you to get the results of your procedure. Ask your health care provider, or the department that is doing the procedure, when your results will be ready.  Summary  · An angiogram is a procedure used to examine the blood vessels.  · Before the procedure, follow your health care provider's instructions about eating and drinking restrictions. You may be asked to stop eating and drinking several hours before the procedure.  · During the procedure, contrast dye is injected through a thin tube (catheter) into an artery. X-rays are then taken.  · After the procedure, you will need to  drink plenty of fluids and lie flat for 6 hour.  This information is not intended to replace advice given to you by your health care provider. Make sure you discuss any questions you have with your health care provider.  Document Revised: 07/01/2020 Document Reviewed: 07/01/2020  [employer] Patient Education © 2021 [employer]

## 2021-05-19 ENCOUNTER — TELEPHONE (OUTPATIENT)
Dept: VASCULAR SURGERY | Facility: CLINIC | Age: 61
End: 2021-05-19

## 2021-05-19 ENCOUNTER — HOSPITAL ENCOUNTER (OUTPATIENT)
Facility: HOSPITAL | Age: 61
Setting detail: HOSPITAL OUTPATIENT SURGERY
End: 2021-05-19
Attending: SURGERY | Admitting: SURGERY

## 2021-05-19 PROBLEM — Z79.02 ENCOUNTER FOR MONITORING ANTIPLATELET THERAPY: Status: ACTIVE | Noted: 2021-05-19

## 2021-05-19 PROBLEM — Z51.81 ENCOUNTER FOR MONITORING ANTIPLATELET THERAPY: Status: ACTIVE | Noted: 2021-05-19

## 2021-05-19 PROBLEM — Z01.818 PREOP TESTING: Status: ACTIVE | Noted: 2021-05-19

## 2021-05-19 NOTE — TELEPHONE ENCOUNTER
Attempted to contact patient and advise of upcoming procedure with Dr. Juarez but there was no answer and the VM was full. Patient pre work is scheduled for 5/28/2021 at 1145 am.  Patient procedure is scheduled for 6/4/2021 at 800 am.  Mailed all information to the patient requesting that he call the office to confirm that it was received.

## 2021-05-26 ENCOUNTER — TRANSCRIBE ORDERS (OUTPATIENT)
Dept: ADMINISTRATIVE | Facility: HOSPITAL | Age: 61
End: 2021-05-26

## 2021-05-26 DIAGNOSIS — Z11.59 SCREENING FOR VIRAL DISEASE: Primary | ICD-10-CM

## 2021-05-27 ENCOUNTER — TELEPHONE (OUTPATIENT)
Dept: VASCULAR SURGERY | Facility: CLINIC | Age: 61
End: 2021-05-27

## 2021-05-27 NOTE — TELEPHONE ENCOUNTER
Spoke with patient and advised of upcoming procedure.  Patient pre work is scheduled for 5/28/2021 at 1145 am.  Patient procedure is scheduled for 6/4/2021 at 800 am.  Patient advised of location time and prep.  Patient expressed understanding for all that was discussed.

## 2021-05-28 ENCOUNTER — PRE-ADMISSION TESTING (OUTPATIENT)
Dept: PREADMISSION TESTING | Facility: HOSPITAL | Age: 61
End: 2021-05-28

## 2021-05-28 VITALS
SYSTOLIC BLOOD PRESSURE: 126 MMHG | OXYGEN SATURATION: 99 % | RESPIRATION RATE: 18 BRPM | HEIGHT: 72 IN | BODY MASS INDEX: 24.75 KG/M2 | WEIGHT: 182.76 LBS | HEART RATE: 92 BPM | DIASTOLIC BLOOD PRESSURE: 93 MMHG

## 2021-05-28 DIAGNOSIS — I73.9 PAD (PERIPHERAL ARTERY DISEASE) (HCC): ICD-10-CM

## 2021-05-28 DIAGNOSIS — Z79.02 ENCOUNTER FOR MONITORING ANTIPLATELET THERAPY: ICD-10-CM

## 2021-05-28 DIAGNOSIS — Z01.818 PREOP TESTING: ICD-10-CM

## 2021-05-28 DIAGNOSIS — Z51.81 ENCOUNTER FOR MONITORING ANTIPLATELET THERAPY: ICD-10-CM

## 2021-05-28 LAB
ANION GAP SERPL CALCULATED.3IONS-SCNC: 8 MMOL/L (ref 5–15)
APTT PPP: 28.9 SECONDS (ref 24.1–35)
BASOPHILS # BLD AUTO: 0.08 10*3/MM3 (ref 0–0.2)
BASOPHILS NFR BLD AUTO: 0.8 % (ref 0–1.5)
BUN SERPL-MCNC: 8 MG/DL (ref 6–20)
BUN/CREAT SERPL: 10.5 (ref 7–25)
CALCIUM SPEC-SCNC: 9.6 MG/DL (ref 8.6–10.5)
CHLORIDE SERPL-SCNC: 105 MMOL/L (ref 98–107)
CO2 SERPL-SCNC: 29 MMOL/L (ref 22–29)
CREAT SERPL-MCNC: 0.76 MG/DL (ref 0.76–1.27)
DEPRECATED RDW RBC AUTO: 46.5 FL (ref 37–54)
EOSINOPHIL # BLD AUTO: 0.21 10*3/MM3 (ref 0–0.4)
EOSINOPHIL NFR BLD AUTO: 2.1 % (ref 0.3–6.2)
ERYTHROCYTE [DISTWIDTH] IN BLOOD BY AUTOMATED COUNT: 13.5 % (ref 12.3–15.4)
GFR SERPL CREATININE-BSD FRML MDRD: 105 ML/MIN/1.73
GLUCOSE SERPL-MCNC: 99 MG/DL (ref 65–99)
HCT VFR BLD AUTO: 44.9 % (ref 37.5–51)
HGB BLD-MCNC: 15.5 G/DL (ref 13–17.7)
IMM GRANULOCYTES # BLD AUTO: 0.03 10*3/MM3 (ref 0–0.05)
IMM GRANULOCYTES NFR BLD AUTO: 0.3 % (ref 0–0.5)
INR PPP: 1.02 (ref 0.91–1.09)
LYMPHOCYTES # BLD AUTO: 2.47 10*3/MM3 (ref 0.7–3.1)
LYMPHOCYTES NFR BLD AUTO: 24.4 % (ref 19.6–45.3)
MCH RBC QN AUTO: 32.5 PG (ref 26.6–33)
MCHC RBC AUTO-ENTMCNC: 34.5 G/DL (ref 31.5–35.7)
MCV RBC AUTO: 94.1 FL (ref 79–97)
MONOCYTES # BLD AUTO: 1.18 10*3/MM3 (ref 0.1–0.9)
MONOCYTES NFR BLD AUTO: 11.7 % (ref 5–12)
NEUTROPHILS NFR BLD AUTO: 6.15 10*3/MM3 (ref 1.7–7)
NEUTROPHILS NFR BLD AUTO: 60.7 % (ref 42.7–76)
NRBC BLD AUTO-RTO: 0 /100 WBC (ref 0–0.2)
PLATELET # BLD AUTO: 198 10*3/MM3 (ref 140–450)
PMV BLD AUTO: 9.8 FL (ref 6–12)
POTASSIUM SERPL-SCNC: 4.4 MMOL/L (ref 3.5–5.2)
PROTHROMBIN TIME: 12.6 SECONDS (ref 11.5–13.4)
RBC # BLD AUTO: 4.77 10*6/MM3 (ref 4.14–5.8)
SODIUM SERPL-SCNC: 142 MMOL/L (ref 136–145)
WBC # BLD AUTO: 10.12 10*3/MM3 (ref 3.4–10.8)

## 2021-05-28 PROCEDURE — 85730 THROMBOPLASTIN TIME PARTIAL: CPT

## 2021-05-28 PROCEDURE — 85610 PROTHROMBIN TIME: CPT

## 2021-05-28 PROCEDURE — 36415 COLL VENOUS BLD VENIPUNCTURE: CPT

## 2021-05-28 PROCEDURE — 93010 ELECTROCARDIOGRAM REPORT: CPT | Performed by: EMERGENCY MEDICINE

## 2021-05-28 PROCEDURE — 93005 ELECTROCARDIOGRAM TRACING: CPT

## 2021-05-28 PROCEDURE — 80048 BASIC METABOLIC PNL TOTAL CA: CPT

## 2021-05-28 PROCEDURE — 85025 COMPLETE CBC W/AUTO DIFF WBC: CPT

## 2021-05-28 RX ORDER — IBUPROFEN 200 MG
400 TABLET ORAL EVERY 6 HOURS PRN
COMMUNITY

## 2021-05-28 NOTE — DISCHARGE INSTRUCTIONS
DAY OF SURGERY INSTRUCTIONS        YOUR SURGEON: Dr. Juarez     PROCEDURE: Left Lower Extremity Angiogram     DATE OF SURGERY: June 4, 2021     ARRIVAL TIME: AS DIRECTED BY OFFICE    YOU MAY TAKE THE FOLLOWING MEDICATION(S) THE MORNING OF SURGERY WITH A SIP OF WATER: Metoprolol       ALL OTHER HOME MEDICATION CHECK WITH YOUR PHYSICIAN      DO NOT TAKE ANY ERECTILE DYSFUNCTION MEDICATIONS (EX: CIALIS, VIAGRA) 24 HOURS PRIOR TO SURGERY                      MANAGING PAIN AFTER SURGERY    We know you are probably wondering what your pain will be like after surgery.  Following surgery it is unrealistic to expect you will not have pain.   Pain is how our bodies let us know that something is wrong or cautions us to be careful.  That said, our goal is to make your pain tolerable.    Methods we may use to treat your pain include (oral or IV medications, PCAs, epidurals, nerve blocks, etc.)   While some procedures require IV pain medications for a short time after surgery, transitioning to pain medications by mouth allows for better management of pain.   Your nurse will encourage you to take oral pain medications whenever possible.  IV medications work almost immediately, but only last a short while.  Taking medications by mouth allows for a more constant level of medication in your blood stream for a longer period of time.      Once your pain is out of control it is harder to get back under control.  It is important you are aware when your next dose of pain medication is due.  If you are admitted, your nurse may write the time of your next dose on the white board in your room to help you remember.      We are interested in your pain and encourage you to inform us about aggravating factors during your visit.   Many times a simple repositioning every few hours can make a big difference.    If your physician says it is okay, do not let your pain prevent you from getting out of bed. Be sure to call your nurse for assistance  prior to getting up so you do not fall.      Before surgery, please decide your tolerable pain goal.  These faces help describe the pain ratings we use on a 0-10 scale.   Be prepared to tell us your goal and whether or not you take pain or anxiety medications at home.          BEFORE YOU COME TO THE HOSPITAL  (Pre-op instructions)  • Do not eat, drink, smoke or chew gum after midnight the night before surgery.  This also includes no mints.  • Morning of surgery take only the medicines you have been instructed with a sip of water unless otherwise instructed  by your physician.  • Do not shave, wear makeup or dark nail polish.  • Remove all jewelry including rings.  • Leave anything you consider valuable at home.  • Leave your suitcase in the car until after your surgery.  • Bring the following with you if applicable:  o Picture ID and insurance, Medicare or Medicaid cards  o Co-pay/deductible required by insurance (cash, check, credit card)  o Copy of advance directive, living will or power-of- documents if not brought to PAT  o CPAP or BIPAP mask and tubing  o Relaxation aids ( book, magazine), etc.  o Hearing aids                        ON THE DAY OF SURGERY  · On the day of surgery check in at registration located at the main entrance of the hospital.   ? You will be registered and given a beeper with instructions where to wait in the main lobby.  ? When your beeper lights up and vibrates a member of the Outpatient Surgery staff will meet you at the double doors under the stair steps and escort you to your preoperative room.   · You may have cloth compression devices placed on your legs. These help to prevent blood clots and reduce swelling in your legs.  · An IV may be inserted into one of your veins.  · In the operating room, you may be given one or more of the following:  ? A medicine to help you relax (sedative).  ? A medicine to numb the area (local anesthetic).  ? A medicine to make you fall asleep  "(general anesthetic).  ? A medicine that is injected into an area of your body to numb everything below the injection site (regional anesthetic).  · Your surgical site will be marked or identified.  · You may be given an antibiotic through your IV to help prevent infection.  Contact a health care provider if you:  · Develop a fever of more than 100.4°F (38°C) or other feelings of illness during the 48 hours before your surgery.  · Have symptoms that get worse.  Have questions or concerns about your surgery    General Anesthesia/Surgery, Adult  General anesthesia is the use of medicines to make a person \"go to sleep\" (unconscious) for a medical procedure. General anesthesia must be used for certain procedures, and is often recommended for procedures that:  · Last a long time.  · Require you to be still or in an unusual position.  · Are major and can cause blood loss.  The medicines used for general anesthesia are called general anesthetics. As well as making you unconscious for a certain amount of time, these medicines:  · Prevent pain.  · Control your blood pressure.  · Relax your muscles.  Tell a health care provider about:  · Any allergies you have.  · All medicines you are taking, including vitamins, herbs, eye drops, creams, and over-the-counter medicines.  · Any problems you or family members have had with anesthetic medicines.  · Types of anesthetics you have had in the past.  · Any blood disorders you have.  · Any surgeries you have had.  · Any medical conditions you have.  · Any recent upper respiratory, chest, or ear infections.  · Any history of:  ? Heart or lung conditions, such as heart failure, sleep apnea, asthma, or chronic obstructive pulmonary disease (COPD).  ?  service.  ? Depression or anxiety.  · Any tobacco or drug use, including marijuana or alcohol use.  · Whether you are pregnant or may be pregnant.  What are the risks?  Generally, this is a safe procedure. However, problems may " occur, including:  · Allergic reaction.  · Lung and heart problems.  · Inhaling food or liquid from the stomach into the lungs (aspiration).  · Nerve injury.  · Air in the bloodstream, which can lead to stroke.  · Extreme agitation or confusion (delirium) when you wake up from the anesthetic.  · Waking up during your procedure and being unable to move. This is rare.  These problems are more likely to develop if you are having a major surgery or if you have an advanced or serious medical condition. You can prevent some of these complications by answering all of your health care provider's questions thoroughly and by following all instructions before your procedure.  General anesthesia can cause side effects, including:  · Nausea or vomiting.  · A sore throat from the breathing tube.  · Hoarseness.  · Wheezing or coughing.  · Shaking chills.  · Tiredness.  · Body aches.  · Anxiety.  · Sleepiness or drowsiness.  · Confusion or agitation.  RISKS AND COMPLICATIONS OF SURGERY  Your health care provider will discuss possible risks and complications with you before surgery. Common risks and complications include:    · Problems due to the use of anesthetics.  · Blood loss and replacement (does not apply to minor surgical procedures).  · Temporary increase in pain due to surgery.  · Uncorrected pain or problems that the surgery was meant to correct.  · Infection.  · New damage.    What happens before the procedure?    Medicines  Ask your health care provider about:  · Changing or stopping your regular medicines. This is especially important if you are taking diabetes medicines or blood thinners.  · Taking medicines such as aspirin and ibuprofen. These medicines can thin your blood. Do not take these medicines unless your health care provider tells you to take them.  · Taking over-the-counter medicines, vitamins, herbs, and supplements. Do not take these during the week before your procedure unless your health care provider  approves them.  General instructions  · Starting 3-6 weeks before the procedure, do not use any products that contain nicotine or tobacco, such as cigarettes and e-cigarettes. If you need help quitting, ask your health care provider.  · If you brush your teeth on the morning of the procedure, make sure to spit out all of the toothpaste.  · Tell your health care provider if you become ill or develop a cold, cough, or fever.  · If instructed by your health care provider, bring your sleep apnea device with you on the day of your surgery (if applicable).  · Ask your health care provider if you will be going home the same day, the following day, or after a longer hospital stay.  ? Plan to have someone take you home from the hospital or clinic.  ? Plan to have a responsible adult care for you for at least 24 hours after you leave the hospital or clinic. This is important.  What happens during the procedure?  · You will be given anesthetics through both of the following:  ? A mask placed over your nose and mouth.  ? An IV in one of your veins.  · You may receive a medicine to help you relax (sedative).  · After you are unconscious, a breathing tube may be inserted down your throat to help you breathe. This will be removed before you wake up.  · An anesthesia specialist will stay with you throughout your procedure. He or she will:  ? Keep you comfortable and safe by continuing to give you medicines and adjusting the amount of medicine that you get.  ? Monitor your blood pressure, pulse, and oxygen levels to make sure that the anesthetics do not cause any problems.  The procedure may vary among health care providers and hospitals.  What happens after the procedure?  · Your blood pressure, temperature, heart rate, breathing rate, and blood oxygen level will be monitored until the medicines you were given have worn off.  · You will wake up in a recovery area. You may wake up slowly.  · If you feel anxious or agitated, you may  be given medicine to help you calm down.  · If you will be going home the same day, your health care provider may check to make sure you can walk, drink, and urinate.  · Your health care provider will treat any pain or side effects you have before you go home.  · Do not drive for 24 hours if you were given a sedative.  Summary  · General anesthesia is used to keep you still and prevent pain during a procedure.  · It is important to tell your healthcare provider about your medical history and any surgeries you have had, and previous experience with anesthesia.  · Follow your healthcare provider’s instructions about when to stop eating, drinking, or taking certain medicines before your procedure.  · Plan to have someone take you home from the hospital or clinic.  This information is not intended to replace advice given to you by your health care provider. Make sure you discuss any questions you have with your health care provider.  Document Released: 03/26/2009 Document Revised: 08/03/2018 Document Reviewed: 08/03/2018  [employer] Interactive Patient Education © 2019 [employer]       Fall Prevention in Hospitals, Adult  As a hospital patient, your condition and the treatments you receive can increase your risk for falls. Some additional risk factors for falls in a hospital include:  · Being in an unfamiliar environment.  · Being on bed rest.  · Your surgery.  · Taking certain medicines.  · Your tubing requirements, such as intravenous (IV) therapy or catheters.  It is important that you learn how to decrease fall risks while at the hospital. Below are important tips that can help prevent falls.  SAFETY TIPS FOR PREVENTING FALLS  Talk about your risk of falling.  · Ask your health care provider why you are at risk for falling. Is it your medicine, illness, tubing placement, or something else?  · Make a plan with your health care provider to keep you safe from falls.  · Ask your health care provider or pharmacist about  side effects of your medicines. Some medicines can make you dizzy or affect your coordination.  Ask for help.  · Ask for help before getting out of bed. You may need to press your call button.  · Ask for assistance in getting safely to the toilet.  · Ask for a walker or cane to be put at your bedside. Ask that most of the side rails on your bed be placed up before your health care provider leaves the room.  · Ask family or friends to sit with you.  · Ask for things that are out of your reach, such as your glasses, hearing aids, telephone, bedside table, or call button.  Follow these tips to avoid falling:  · Stay lying or seated, rather than standing, while waiting for help.  · Wear rubber-soled slippers or shoes whenever you walk in the hospital.  · Avoid quick, sudden movements.  ¨ Change positions slowly.  ¨ Sit on the side of your bed before standing.  ¨ Stand up slowly and wait before you start to walk.  · Let your health care provider know if there is a spill on the floor.  · Pay careful attention to the medical equipment, electrical cords, and tubes around you.  · When you need help, use your call button by your bed or in the bathroom. Wait for one of your health care providers to help you.  · If you feel dizzy or unsure of your footing, return to bed and wait for assistance.  · Avoid being distracted by the TV, telephone, or another person in your room.  · Do not lean or support yourself on rolling objects, such as IV poles or bedside tables.     This information is not intended to replace advice given to you by your health care provider. Make sure you discuss any questions you have with your health care provider.     Document Released: 12/15/2001 Document Revised: 01/08/2016 Document Reviewed: 08/25/2013  [employer] Interactive Patient Education ©2016 [employer]       [facility]  CHG 4% Patient Instruction Sheet    Chlorhexidine Before Surgery  Chlorhexidine gluconate (CHG) is a germ-killing  (antiseptic) solution that is used to clean the skin. It gets rid of the bacteria that normally live on the skin. Cleaning your skin with CHG before surgery helps lower the risk for infection after surgery.    How to use CHG solution  · You will take 2 showers, one shower the night before surgery, the second shower the morning of surgery before coming to the hospital.  · Use CHG only as told by your health care provider, and follow the instructions on the label.  · Use CHG solution while taking a shower. Follow these steps when using CHG solution (unless your health care provider gives you different instructions):  1. Start the shower.  2. Use your normal soap and shampoo to wash your face and hair.  3. Turn off the shower or move out of the shower stream.  4. Pour the CHG onto a clean washcloth. Do not use any type of brush or rough-edged sponge.  5. Starting at your neck, lather your body down to your toes. Make sure you:  6. Pay special attention to the part of your body where you will be having surgery. Scrub this area for at least 1 minute.  7. Use the full amount of CHG as directed. Usually, this is one half bottle for each shower.  8. Do not use CHG on your head or face. If the solution gets into your ears or eyes, rinse them well with water.  9. Avoid your genital area.  10. Avoid any areas of skin that have broken skin, cuts, or scrapes.  11. Scrub your back and under your arms. Make sure to wash skin folds.  12. Let the lather sit on your skin for 1-2 minutes or as long as told by your health care  provider.  13. Thoroughly rinse your entire body in the shower. Make sure that all body creases and crevices are rinsed well.  14. Dry off with a clean towel. Do not put any substances on your body afterward, such as powder, lotion, or perfume.  15. Put on clean clothes or pajamas.  16. If it is the night before your surgery, sleep in clean sheets.    What are the risks?  Risks of using CHG include:  · A skin  reaction.  · Hearing loss, if CHG gets in your ears.  · Eye injury, if CHG gets in your eyes and is not rinsed out.  · The CHG product catching fire.  Make sure that you avoid smoking and flames after applying CHG to your skin.  Do not use CHG:  · If you have a chlorhexidine allergy or have previously reacted to chlorhexidine.  · On babies younger than 2 months of age.      On the day of surgery, when you are taken to your room in Outpatient Surgery you will be given a CHG prepackaged cloth to wipe the site for your surgery.  How to use CHG prepackaged cloths  · Follow the instructions on the label.  · Use the CHG cloth on clean, dry skin. Follow these steps when using a CHG cloth (unless your health care provider gives you different instructions):  1. Using the CHG cloth, vigorously scrub the part of your body where you will be having surgery. Scrub using a back-and-forth motion for 3 minutes. The area on your body should be completely wet with CHG when you are finished scrubbing.  2. Do not rinse. Discard the cloth and let the area air-dry for 1 minute. Do not put any substances on your body afterward, such as powder, lotion, or perfume.  Contact a health care provider if:  · Your skin gets irritated after scrubbing.  · You have questions about using your solution or cloth.  Get help right away if:  · Your eyes become very red or swollen.  · Your eyes itch badly.  · Your skin itches badly and is red or swollen.  · Your hearing changes.  · You have trouble seeing.  · You have swelling or tingling in your mouth or throat.  · You have trouble breathing.  · You swallow any chlorhexidine.  Summary  · Chlorhexidine gluconate (CHG) is a germ-killing (antiseptic) solution that is used to clean the skin. Cleaning your skin with CHG before surgery helps lower the risk for infection after surgery.  · You may be given CHG to use at home. It may be in a bottle or in a prepackaged cloth to use on your skin. Carefully follow  your health care provider's instructions and the instructions on the product label.  · Do not use CHG if you have a chlorhexidine allergy.  · Contact your health care provider if your skin gets irritated after scrubbing.  This information is not intended to replace advice given to you by your health care provider. Make sure you discuss any questions you have with your health care provider.  Document Released: 09/11/2013 Document Revised: 11/15/2018 Document Reviewed: 11/15/2018  [employer] Interactive Patient Education © 2019 [employer]          PATIENT/FAMILY/RESPONSIBLE PARTY VERBALIZES UNDERSTANDING OF ABOVE EDUCATION.  COPY OF PAIN SCALE GIVEN AND REVIEWED WITH VERBALIZED UNDERSTANDING.

## 2021-05-29 LAB
QT INTERVAL: 360 MS
QTC INTERVAL: 420 MS

## 2021-06-01 ENCOUNTER — LAB (OUTPATIENT)
Dept: LAB | Facility: HOSPITAL | Age: 61
End: 2021-06-01

## 2021-06-01 LAB — SARS-COV-2 ORF1AB RESP QL NAA+PROBE: NOT DETECTED

## 2021-06-01 PROCEDURE — U0004 COV-19 TEST NON-CDC HGH THRU: HCPCS | Performed by: SURGERY

## 2021-06-01 PROCEDURE — C9803 HOPD COVID-19 SPEC COLLECT: HCPCS | Performed by: SURGERY

## 2021-06-03 ENCOUNTER — TELEPHONE (OUTPATIENT)
Dept: VASCULAR SURGERY | Facility: CLINIC | Age: 61
End: 2021-06-03

## 2021-06-04 ENCOUNTER — TELEPHONE (OUTPATIENT)
Dept: VASCULAR SURGERY | Facility: CLINIC | Age: 61
End: 2021-06-04

## 2021-06-04 NOTE — TELEPHONE ENCOUNTER
Pt called back and confirmed his new arrival time for surgery . He stated he would get in touch with his brother to bring him.

## 2021-06-04 NOTE — H&P
6/4/2021         Narendra Camara MD  [address] [place] [address] [address]  [place] [address]           Sascha Oden Sr.  1960          Chief Complaint   Patient presents with   • Follow-up       6 Month Follow Up -PAD (peripheral artery disease- testing comp 05/12/2021 [facility] and bilateral carotid- pt states left leg is bothering him. Pt denies stroke symptoms- pt pain 10/10 after walking for some time.    • smoking status       everday smoker   • Med Management       verbally reviewed meds with pt          Dear Narendra Camara MD:     HPI      I had the pleasure of seeing you patient in the office today for follow up.  As you recall, the patient is a 60 y.o. male who we are currently following for bilateral lower extremity PAD.  He has had multiple vascular interventions, most recently right lower extremity in February 2019.   He does report being taken off of his chronic pain medication which is causing his discomfort to his left hip back.  He is maintained at this time on aspirin and Zocor.  He was taken off Plavix because the hospital but all found him to be in atrial fibrillation and began Eliquis however he reports his primary care provider did not continue this.  For the past month he is having complaints of short distance claudication to his left leg.  He reports not being able to walk to his mailbox or into the grocery store without significant discomfort.  Intervention to his left lower extremity looks like was in 2016.  He did have noninvasive testing performed today, which I did review in office.       Past Medical History:   Diagnosis Date   • Alcohol abuse    • Arthritis    • Atrial fibrillation (CMS/HCC)     pt states has had in past    • Blackout spell    • Chronic back pain 1991    s/p fall    • Emphysema of lung (CMS/HCC)    • Hypercholesterolemia     Statin therapy    • Hypertension    • Oxygen desaturation during sleep     pt supposed to wear oxygen at night but doesn't    • PAP (pulmonary  alveolar proteinosis) (CMS/HCC)    • PVD (peripheral vascular disease) (CMS/HCC)     Followed by Dr. Juarez; ASA, Plavix, leg revascularization    • Syncope    • Tobacco abuse     1 PPD     Past Surgical History:   Procedure Laterality Date   • ANGIOPLASTY     • AORTAGRAM Right 2/11/2019    Procedure: RIGHT LOWER EXTREMITY ANGIOGRAM;  Surgeon: Jitendra Juarez DO;  Location: [facility] HYBRID OR 12;  Service: Vascular   • APPENDECTOMY     • BACK SURGERY      lumbar    • CAROTID STENT Bilateral    • HAND SURGERY Right    • LEG REVASCULARIZATION Bilateral    • VEIN SURGERY       Family History   Problem Relation Age of Onset   • Heart disease Other    • Diabetes Other    • Cancer Other    • Stroke Other    • Kidney disease Other    • Heart attack Maternal Grandfather    • Arrhythmia Paternal Grandmother    • Cancer Paternal Grandmother         colon   • Hypertension Mother    • Diabetes Mother    • Stroke Mother    • Kidney disease Father    • Cancer Paternal Grandfather         colon     Social History     Tobacco Use   • Smoking status: Current Every Day Smoker     Packs/day: 1.00     Types: Cigarettes   • Smokeless tobacco: Never Used   • Tobacco comment: Pt states he has slowed down   Vaping Use   • Vaping Use: Never used   Substance Use Topics   • Alcohol use: Yes     Alcohol/week: 6.0 standard drinks     Types: 6 Cans of beer per week     Comment: 6 pack per day    • Drug use: No     Allergies   Allergen Reactions   • Codeine GI Intolerance     Current Outpatient Medications   Medication Instructions   • apixaban (ELIQUIS) 5 mg, Oral, Every 12 Hours Scheduled   • aspirin 81 mg, Oral, Daily   • ibuprofen (ADVIL,MOTRIN) 400 mg, Oral, Every 6 Hours PRN   • methocarbamol (ROBAXIN) 750 mg, Oral, 3 Times Daily PRN   • metoprolol tartrate (LOPRESSOR) 100 mg, Oral, 2 Times Daily   • nitroglycerin (NITROSTAT) 0.4 MG SL tablet 1 under the tongue as needed for angina, may repeat q5mins for up three doses   • simvastatin  "(ZOCOR) 20 mg, Oral, Nightly          Review of Systems   Constitutional: Negative.    HENT: Negative.    Eyes: Negative.    Respiratory: Negative.    Cardiovascular: Negative.         Claudication left leg   Gastrointestinal: Negative.    Endocrine: Negative.    Genitourinary: Negative.    Musculoskeletal: Positive for arthralgias and back pain.   Skin: Negative.    Allergic/Immunologic: Negative.    Neurological: Negative.    Hematological: Negative.    Psychiatric/Behavioral: Negative.    All other systems reviewed and are negative.        /74 (BP Location: Right arm, Patient Position: Sitting, Cuff Size: Adult)   Pulse 88   Ht 190.5 cm (75\")   Wt 80.9 kg (178 lb 6.4 oz)   SpO2 94%   BMI 22.30 kg/m²   Physical Exam  Vitals and nursing note reviewed.   Constitutional:       General: He is not in acute distress.     Appearance: Normal appearance. He is well-developed. He is not diaphoretic.   HENT:      Head: Normocephalic and atraumatic.   Neck:      Vascular: No carotid bruit or JVD.   Cardiovascular:      Rate and Rhythm: Normal rate and regular rhythm.      Pulses:           Femoral pulses are 2+ on the right side and 2+ on the left side.       Popliteal pulses are 2+ on the right side.        Dorsalis pedis pulses are 2+ on the right side.        Posterior tibial pulses are 2+ on the right side and detected w/ Doppler on the left side.      Heart sounds: Normal heart sounds, S1 normal and S2 normal. No murmur heard.   No friction rub. No gallop.       Comments: Left doppler PT/peroneal  Pulmonary:      Effort: Pulmonary effort is normal.      Breath sounds: Normal breath sounds.   Abdominal:      General: Bowel sounds are normal. There is no abdominal bruit.      Palpations: Abdomen is soft.      Tenderness: There is no abdominal tenderness.   Musculoskeletal:         General: Normal range of motion.      Cervical back: Normal range of motion and neck supple.   Skin:     General: Skin is warm and " dry.   Neurological:      General: No focal deficit present.      Mental Status: He is alert and oriented to person, place, and time.      Cranial Nerves: No cranial nerve deficit.   Psychiatric:         Mood and Affect: Mood normal.         Behavior: Behavior normal.         Thought Content: Thought content normal.         Judgment: Judgment normal.               Diagnostic data:           Patient Active Problem List   Diagnosis   • Pneumonia of both lower lobes due to infectious organism   • Sinus tachycardia   • Tobacco abuse   • Panlobular emphysema (CMS/[facility])   • Cough with hemoptysis   • Coffee ground emesis   • Paroxysmal SVT (supraventricular tachycardia) (CMS/[facility])   • Precordial chest pain   • Hypercholesterolemia   • Hypertension   • Chronic back pain   • PAD (peripheral artery disease) (CMS/[facility])   • Rhabdomyolysis   • Hyponatremia   • Alcoholism /alcohol abuse (CMS/[facility])   • Syncope and collapse   • Diarrhea   • Cough   • COPD with acute exacerbation (CMS/[facility])   • CAP (community acquired pneumonia)   • Thrombocytopenia (CMS/[facility])   • Hypokalemia   • Influenza A   • PAF (paroxysmal atrial fibrillation) (CMS/[facility])   • Acute respiratory failure with hypoxia (CMS/[facility])   • BUCKNER (dyspnea on exertion)   • Current use of long term anticoagulation   • Abnormal nuclear stress test             ICD-10-CM ICD-9-CM   1. PAD (peripheral artery disease) (CMS/[facility])  I73.9 443.9   2. Preop testing  Z01.818 V72.84   3. Encounter for monitoring antiplatelet therapy  Z51.81 V58.83     Z79.02 V58.63   4. Hypercholesterolemia  E78.00 272.0   5. Essential hypertension  I10 401.9            PLAN: After thoroughly evaluating Sascha Oden Sr., I believe the best course of action is to proceed with a left lower extremity angiogram.  He is having significant short distance claudication over the past month and testing does show him in the moderate range which was previously mild.    Risks of angiogram were discussed.  These include, but are  not limited to, bleeding, infection, vessel damage, nerve damage, embolus, and loss of limb.  The patient understands these risks and wishes to proceed with procedure.   I did discuss vascular risk factors as they pertain to the progression of vascular disease including controlling his hypertension, hyperlipidemia, and smoking cessation.  His blood pressure is stable on his current medication he is maintained on Zocor for his hyperlipidemia.  I did  him regarding smoking cessation and increased frequency of vascular intervention as long as he continues to smoke. Body mass index is 22.3 kg/m². The patient is to continue taking their medications as previously discussed.   This was all discussed in full with complete understanding.     Thank you for allowing me to participate in the care of your patient.  Please do not hesitate to call with any questions or concerns.  We will keep you aware of any further encounters with Sascha Oden Sr..        Sincerely Yours,           Jitendra Juarez, DO

## 2021-12-30 RX ORDER — METOPROLOL TARTRATE 100 MG/1
TABLET ORAL
Qty: 180 TABLET | Refills: 3 | Status: SHIPPED | OUTPATIENT
Start: 2021-12-30

## 2023-10-30 NOTE — THERAPY TREATMENT NOTE
Acute Care - Occupational Therapy Treatment Note  [facility]     Patient Name: Sascha Oden Sr.  : 1960  MRN: [id number]  Today's Date: 2020  Onset of Illness/Injury or Date of Surgery: 19  Date of Referral to OT: 19  Referring Physician: Dr. Lang    Admit Date: 2019       ICD-10-CM ICD-9-CM   1. Traumatic rhabdomyolysis, initial encounter (CMS/[facility]) T79.6XXA 958.6   2. Hyponatremia E87.1 276.1   3. Dysphagia, unspecified type R13.10 787.20   4. Impaired functional mobility and activity tolerance Z74.09 V49.89   5. Impaired mobility and ADLs Z74.09 799.89     Patient Active Problem List   Diagnosis   • Pneumonia of both lower lobes due to infectious organism (CMS/[facility])   • Sinus tachycardia   • Tobacco abuse   • Emphysema of lung (CMS/[facility])   • Cough with hemoptysis   • Coffee ground emesis   • Paroxysmal SVT (supraventricular tachycardia) (CMS/[facility])   • Chest pain with low risk for cardiac etiology   • Hypercholesterolemia   • Hypertension   • Chronic back pain   • PAD (peripheral artery disease) (CMS/[facility])   • Rhabdomyolysis   • Hyponatremia   • Alcoholism /alcohol abuse (CMS/[facility])   • Syncope and collapse   • Diarrhea   • Cough   • COPD with acute exacerbation (CMS/[facility])   • Sepsis due to pneumonia (CMS/HCC)   • CAP (community acquired pneumonia)   • Thrombocytopenia (CMS/[facility])   • Hypokalemia   • Influenza A     Past Medical History:   Diagnosis Date   • Alcohol abuse    • Arthritis    • Blackout spell    • Chronic back pain     s/p fall    • Emphysema of lung (CMS/[facility])    • Hypercholesterolemia     Statin therapy    • Hypertension    • PAP (pulmonary alveolar proteinosis) (CMS/[facility])    • PVD (peripheral vascular disease) (CMS/HCC)     Followed by Dr. Juarez; ASA, Plavix, leg revascularization    • Tobacco abuse     1 PPD     Past Surgical History:   Procedure Laterality Date   • ANGIOPLASTY     • AORTAGRAM Right 2019    Procedure: RIGHT LOWER EXTREMITY ANGIOGRAM;  Surgeon:  Jitendra Juarez, DO;  Location:  [facility] HYBRID OR 12;  Service: Vascular   • APPENDECTOMY     • BACK SURGERY     • CAROTID STENT Bilateral    • HAND SURGERY Right    • LEG REVASCULARIZATION Bilateral        Therapy Treatment    Rehabilitation Treatment Summary     Row Name 01/01/20 1426 01/01/20 1311          Treatment Time/Intention    Discipline  occupational therapy assistant  -MT  physical therapy assistant  -KJ     Document Type  therapy note (daily note)  -MT  therapy note (daily note)  -KJ2     Subjective Information  complains of;weakness;fatigue;dyspnea  -MT  complains of;weakness;dyspnea  -KJ2     Mode of Treatment  individual therapy;occupational therapy  -MT  physical therapy  -KJ2     Patient/Family Observations  no family   -MT  --     Patient Effort  good  -MT  good  -KJ2     Comment  Pt in chair call light on requesting to go BTB   -MT  --     Existing Precautions/Restrictions  fall;oxygen therapy device and L/min  -MT  fall;oxygen therapy device and L/min  -KJ2     Recorded by [MT] Bobbi Martínez COTA/MIQUEL 01/01/20 1453 [KJ] Indira Le, PTA 01/01/20 1311  [KJ2] Indira Le, PTA 01/01/20 1340     Row Name 01/01/20 1426 01/01/20 1311          Vital Signs    Pre SpO2 (%)  89  -MT  89  -KJ     O2 Delivery Pre Treatment  supplemental O2 6L  -MT  supplemental O2  -KJ     Intra SpO2 (%)  86  -MT  85  -KJ     O2 Delivery Intra Treatment  supplemental O2  -MT  supplemental O2  -KJ     Post SpO2 (%)  87  -MT  89  -KJ     O2 Delivery Post Treatment  supplemental O2  -MT  supplemental O2  -KJ     Pre Patient Position  Sitting  -MT  Supine  -KJ     Intra Patient Position  Sitting  -MT  Standing  -KJ     Post Patient Position  Supine  -MT  Sitting  -KJ     Recorded by [MT] Bobbi Martínez COTA/MIQUEL 01/01/20 1453 [KJ] Indira Le, PTA 01/01/20 1340     Row Name 01/01/20 1426             Cognitive Assessment/Intervention- PT/OT    Orientation Status (Cognition)  oriented x 4  -MT      Personal Safety  Interventions  fall prevention program maintained;gait belt;nonskid shoes/slippers when out of bed;supervised activity  -MT      Recorded by [MT] Bobbi Martínez COTA/L 01/01/20 1453      Row Name 01/01/20 1426             Safety Issues, Functional Mobility    Impairments Affecting Function (Mobility)  balance;endurance/activity tolerance;shortness of breath;strength  -MT      Recorded by [MT] Bobbi Martínez COTA/L 01/01/20 1453      Row Name 01/01/20 1426             Bed Mobility Assessment/Treatment    Bed Mobility Assessment/Treatment  sit-supine  -MT      Sit-Supine [place] (Bed Mobility)  supervision  -MT      Assistive Device (Bed Mobility)  bed rails;head of bed elevated  -MT      Comment (Bed Mobility)  increased time   -MT      Recorded by [MT] Bobbi Martínez COTA/L 01/01/20 1453      Row Name 01/01/20 1426             Functional Mobility    Functional Mobility- Ind. Level  verbal cues required;minimum assist (75% patient effort)  -MT      Functional Mobility- Device  rolling walker  -MT      Functional Mobility- Safety Issues  loses balance backward;supplemental O2  -MT      Functional Mobility- Comment  chair a short distance to bed: LOB backwards needs [place] to correct   -MT      Recorded by [MT] Bobbi Martínez COTA/L 01/01/20 1453      Row Name 01/01/20 1426             Transfer Assessment/Treatment    Transfer Assessment/Treatment  sit-stand transfer;stand-sit transfer  -MT      Comment (Transfers)  cues and olimpia to sit easily   -MT      Recorded by [MT] Bobbi Martínez COTA/L 01/01/20 1453      Row Name 01/01/20 1426 01/01/20 1311          Sit-Stand Transfer    Sit-Stand [place] (Transfers)  verbal cues;minimum assist (75% patient effort)  -MT  verbal cues;minimum assist (75% patient effort)  -KJ     Assistive Device (Sit-Stand Transfers)  walker, front-wheeled  -MT  --     Recorded by [MT] Bobbi Martínez COTA/L 01/01/20 1453 [KJ] Indira Le, YULY 01/01/20 1340     Row Name  01/01/20 1426 01/01/20 1311          Stand-Sit Transfer    Stand-Sit [place] (Transfers)  verbal cues;minimum assist (75% patient effort)  -MT  verbal cues;minimum assist (75% patient effort)  -KJ     Assistive Device (Stand-Sit Transfers)  walker, front-wheeled  -MT  --     Recorded by [MT] Bobbi Martínez COTA/MIQUEL 01/01/20 1453 [KJ] Indira Le, PTA 01/01/20 1340     Row Name 01/01/20 1311             Gait/Stairs Assessment/Training    [place] Level (Gait)  minimum assist (75% patient effort);verbal cues  -KJ      Assistive Device (Gait)  walker, front-wheeled  -KJ      Distance in Feet (Gait)  5-6 forward/backward x 2; march in place  -KJ      Pattern (Gait)  step-to  -KJ      Deviations/Abnormal Patterns (Gait)  bilateral deviations;stride length decreased;gait speed decreased  -KJ      Bilateral Gait Deviations  weight shift ability decreased  -KJ      Comment (Gait/Stairs)  unsteady on feet; min assist due to LOB and weakness  -KJ      Recorded by [KJ] Indira Le, PTA 01/01/20 1340      Row Name 01/01/20 1426             ADL Assessment/Intervention    BADL Assessment/Intervention  lower body dressing  -MT      Recorded by [MT] Bobbi Martínez COTA/L 01/01/20 1453      Row Name 01/01/20 1426             Lower Body Dressing Assessment/Training    Lower Body Dressing [place] Level  socks;independent  -MT      Lower Body Dressing Position  long sitting  -MT      Comment (Lower Body Dressing)  adjusted socks long sitting; required rest break after d/t SOA   -MT      Recorded by [MT] Bobbi Martínez COTA/L 01/01/20 1453      Row Name 01/01/20 1426             BADL Safety/Performance    Impairments, BADL Safety/Performance  balance;endurance/activity tolerance;shortness of breath;strength  -MT      Recorded by [MT] Bobbi Martínez COTA/L 01/01/20 1453      Row Name 01/01/20 1426 01/01/20 1311          Therapeutic Exercise    Exercise Type (Therapeutic Exercise)  --  AROM (active range of  motion)  -KJ     Position (Therapeutic Exercise)  --  seated  -KJ     Sets/Reps (Therapeutic Exercise)  --  20  -KJ     Comment (Therapeutic Exercise)  given and educated on written HEP which was left in room: pt reports to fatigued to attempt this date   -MT  --     Recorded by [MT] Bobbi Martínez COTA/L 01/01/20 1453 [KJ] Indira Le, PTA 01/01/20 1340     Row Name 01/01/20 1426 01/01/20 1311          Positioning and Restraints    Pre-Treatment Position  sitting in chair/recliner  -MT  in bed  -KJ     Post Treatment Position  bed  -MT  chair  -KJ     In Bed  fowlers;call light within reach;encouraged to call for assist;exit alarm on;side rails up x2;SCD pump applied  -MT  --     Recorded by [MT] Bobbi Martínez COTA/L 01/01/20 1453 [KJ] Indira Le, PTA 01/01/20 1340     Row Name 01/01/20 1311             Pain Scale: Numbers Pre/Post-Treatment    Pain Scale: Numbers, Pretreatment  4/10  -KJ      Pain Scale: Numbers, Post-Treatment  4/10  -KJ      Pain Location - Side  Bilateral  -KJ      Pain Location - Orientation  lower  -KJ      Pain Location  extremity  -KJ      Recorded by [KJ] Indira Le, PTA 01/01/20 1340      Row Name 01/01/20 1426             Pain Scale: FACES Pre/Post-Treatment    Pain: FACES Scale, Pretreatment  2-->hurts little bit  -MT      Pain: FACES Scale, Post-Treatment  0-->no hurt  -MT      Recorded by [MT] Bobbi Martínez COTA/L 01/01/20 1453      Row Name 01/01/20 1426             Plan of Care Review    Plan of Care Reviewed With  patient  -MT      Progress  improving  -MT      Recorded by [MT] Bobbi Martínez COTA/L 01/01/20 1453      Row Name 01/01/20 1426             Outcome Summary/Treatment Plan (OT)    Daily Summary of Progress (OT)  progress towards functional goals is fair  -MT      Barriers to Overall Progress (OT)  weakness, SOA  -MT2      Anticipated Discharge Disposition (OT)  home with home health;skilled nursing facility  -MT2      Recorded by [MT] Mauricio  SHERRIE Martines/L 01/01/20 1453  [MT2] Bobbi Martínez COTA/MIQUEL 01/01/20 1454        User Key  (r) = Recorded By, (t) = Taken By, (c) = Cosigned By    Initials Name Effective Dates Discipline    TYLOR Indira Le, PTA 08/02/16 -  PT    MT Bobbi Martínez COTA/L 11/29/19 -  OT                 OT Recommendation and Plan  Outcome Summary/Treatment Plan (OT)  Daily Summary of Progress (OT): progress towards functional goals is fair  Barriers to Overall Progress (OT): weakness, SOA  Anticipated Discharge Disposition (OT): home with home health, skilled nursing facility  Daily Summary of Progress (OT): progress towards functional goals is fair  Plan of Care Review  Plan of Care Reviewed With: patient  Plan of Care Reviewed With: patient  Outcome Summary: Pt in chair: answered call light and pt requesting to go BTB. Pt sit<>stand and performed fxl mobility short distance to bed Alberto via RW. Pt demo's SOA and is on O2. Pt demo's decreased balance posteriorly. Educated and given written HEP for pt to complete. Reported too fatigued to complete this date. Recommend continued UB strength and endurance for task. Recommend HH vs SNF pending progress  Outcome Measures     Row Name 01/01/20 1426 12/31/19 1455 12/30/19 0917       How much help from another person do you currently need...    Turning from your back to your side while in flat bed without using bedrails?  --  3  -AF  --    Moving from lying on back to sitting on the side of a flat bed without bedrails?  --  3  -AF  --    Moving to and from a bed to a chair (including a wheelchair)?  --  3  -AF  --    Standing up from a chair using your arms (e.g., wheelchair, bedside chair)?  --  3  -AF  --    Climbing 3-5 steps with a railing?  --  2  -AF  --    To walk in hospital room?  --  2  -AF  --    AM-PAC 6 Clicks Score (PT)  --  16  -AF  --       How much help from another is currently needed...    Putting on and taking off regular lower body clothing?  3  -MT  --  3  -MM     Bathing (including washing, rinsing, and drying)  3  -MT  --  3  -MM    Toileting (which includes using toilet bed pan or urinal)  3  -MT  --  3  -MM    Putting on and taking off regular upper body clothing  3  -MT  --  3  -MM    Taking care of personal grooming (such as brushing teeth)  3  -MT  --  3  -MM    Eating meals  4  -MT  --  3  -MM    AM-PAC 6 Clicks Score (OT)  19  -MT  --  18  -MM       Functional Assessment    Outcome Measure Options  --  AM-PAC 6 Clicks Basic Mobility (PT)  -AF  AM-PAC 6 Clicks Daily Activity (OT)  -MM      User Key  (r) = Recorded By, (t) = Taken By, (c) = Cosigned By    Initials Name Provider Type    MM Varun Juarez, OTR/L Occupational Therapist    MT Bobbi Martínez BALDERAS/L Occupational Therapy Assistant    AF Stacey Conde PTA Physical Therapy Assistant           Time Calculation:   Time Calculation- OT     Row Name 01/01/20 1426             Time Calculation- OT    OT Start Time  1426  -MT      OT Stop Time  1450  -MT      OT Time Calculation (min)  24 min  -MT      Total Timed Code Minutes- OT  24 minute(s)  -MT      OT Received On  01/01/20  -MT         Timed Charges    72883 - OT Therapeutic Exercise Minutes  6  -MT      79425 - OT Self Care/Mgmt Minutes  18  -MT        User Key  (r) = Recorded By, (t) = Taken By, (c) = Cosigned By    Initials Name Provider Type    MT Bobbi Martínez BALDERAS/L Occupational Therapy Assistant        Therapy Charges for Today     Code Description Service Date Service Provider Modifiers Qty    17899924505 HC OT THER PROC EA 15 MIN 1/1/2020 Bobbi Martínez BALDERAS/L GO 1    17304306424 HC OT SELF CARE/MGMT/TRAIN EA 15 MIN 1/1/2020 Bobbi Martínez BALDERAS/L GO 1               Bobbi Martínez BALDERAS/MIQUEL  1/1/2020   pt presents to ED A&04 ambulatory at baseline coming in complaining of double vision starteing yesterday at 8pm. Patient states hes been having persistent headaches x2 week every single day. Respirations even and unlabored. Lung sounds clear with equal chest rise bilaterally. ABD is soft, non tender, non distended with normal active bowel sounds No complaints of chest pain, headache, nausea, dizziness, vomiting  SOB, fever, chills, blurry vision verbalized. 20GLAC in place. awaiitng CT

## 2025-06-08 NOTE — ANESTHESIA POSTPROCEDURE EVALUATION
Patient: Sascha Oden Sr.    Procedure Summary     Date:  02/11/19 Room / Location:   [facility] OR  /  [facility] OR 12    Anesthesia Start:  1006 Anesthesia Stop:  1239    Procedure:  RIGHT LOWER EXTREMITY ANGIOGRAM (Right Groin) Diagnosis:       PAD (peripheral artery disease) (CMS/HCC)      (PAD (peripheral artery disease) (CMS/HCC) [I73.9])    Surgeon:  Jitendra Juarez DO Provider:  Yrn Garcia CRNA    Anesthesia Type:  MAC ASA Status:  3          Anesthesia Type: MAC  Last vitals  BP   133/85 (02/11/19 1500)   Temp   98.1 °F (36.7 °C) (02/11/19 1324)   Pulse   66 (02/11/19 1500)   Resp   18 (02/11/19 1500)     SpO2   92 % (02/11/19 1500)     Post Anesthesia Care and Evaluation    Patient location during evaluation: PACU  Patient participation: complete - patient participated  Level of consciousness: awake and alert  Pain management: adequate  Airway patency: patent  Anesthetic complications: No anesthetic complications  PONV Status: controlled  Cardiovascular status: acceptable and hemodynamically stable  Respiratory status: acceptable  Hydration status: acceptable    Comments: Patient discharged from PACU prior to anesthesia evaluation based on Kenneth Score.  For details, see RN note.     /85 (BP Location: Left arm, Patient Position: Lying)   Pulse 66   Temp 98.1 °F (36.7 °C) (Temporal)   Resp 18   SpO2 92%        BIBA from home for difficulty breathing since last night, as per emt, patient was sating around 96% on RA but appears tachypneic, uses accessory muscles to breath, was given 2 combivent treatments and 12mg dexamethasone IM by emt, patient arrived with CPAP

## (undated) DEVICE — DESTINATION RENAL GUIDING SHEATH: Brand: DESTINATION

## (undated) DEVICE — BG BANDED WRUBBER BAND AND TP 36X54IN

## (undated) DEVICE — DRSNG SURESITE WNDW 4X4.5

## (undated) DEVICE — PAD ENDOVASCULAR: Brand: MEDLINE INDUSTRIES, INC.

## (undated) DEVICE — Device: Brand: HALBERD18

## (undated) DEVICE — ATHERECTOMY H1-LX HAWKONE 7F EXT TIP US: Brand: HAWKONE™

## (undated) DEVICE — RADIFOCUS GLIDEWIRE ADVANTAGE GUIDEWIRE: Brand: GLIDEWIRE ADVANTAGE

## (undated) DEVICE — ST MIC/INTRO ACC SHRP/NDL TUNG/TP NITNL 5F 45CM 7CM

## (undated) DEVICE — Device

## (undated) DEVICE — PINNACLE INTRODUCER SHEATH: Brand: PINNACLE

## (undated) DEVICE — INFLATION DEVICE: Brand: ENCORE™ 26

## (undated) DEVICE — ARMADA 35 PTA CATHETER 5 MM X 40 MM X 80 CM / OVER-THE-WIRE: Brand: ARMADA

## (undated) DEVICE — DEV EPS SPIDERFX 6MM OTW320/RX190CM

## (undated) DEVICE — GLV SURG BIOGEL LTX PF 7 1/2

## (undated) DEVICE — NAVICROSS SUPPORT CATHETER: Brand: NAVICROSS

## (undated) DEVICE — PK TURNOVER RM ADV

## (undated) DEVICE — GLIDESHEATH SLENDER STAINLESS STEEL KIT: Brand: GLIDESHEATH SLENDER

## (undated) DEVICE — KT MINI ACC MAK401

## (undated) DEVICE — SYR LL TP 10ML STRL

## (undated) DEVICE — MYNX ACE VASCULAR CLOSURE DEVICE (6F/7F): Brand: MYNX ACE VASCULAR CLOSURE DEVICE (6F/7F)

## (undated) DEVICE — PROXIMATE RH ROTATING HEAD SKIN STAPLERS (35 WIDE) CONTAINS 35 STAINLESS STEEL STAPLES: Brand: PROXIMATE